# Patient Record
Sex: MALE | Race: OTHER | Employment: OTHER | URBAN - METROPOLITAN AREA
[De-identification: names, ages, dates, MRNs, and addresses within clinical notes are randomized per-mention and may not be internally consistent; named-entity substitution may affect disease eponyms.]

---

## 2018-05-01 ENCOUNTER — HOSPITAL ENCOUNTER (EMERGENCY)
Age: 68
Discharge: HOME | End: 2018-05-01
Payer: MEDICARE

## 2018-05-01 VITALS
DIASTOLIC BLOOD PRESSURE: 78 MMHG | TEMPERATURE: 98 F | WEIGHT: 195 LBS | OXYGEN SATURATION: 98 % | BODY MASS INDEX: 28.88 KG/M2 | SYSTOLIC BLOOD PRESSURE: 173 MMHG | RESPIRATION RATE: 16 BRPM | HEIGHT: 69 IN | HEART RATE: 65 BPM

## 2018-05-01 DIAGNOSIS — J06.9 VIRAL URI WITH COUGH: Primary | ICD-10-CM

## 2018-05-01 LAB
INFLUENZA A BY PCR: NOT DETECTED
INFLUENZA B BY PCR: NOT DETECTED

## 2018-05-01 PROCEDURE — 6370000000 HC RX 637 (ALT 250 FOR IP): Performed by: PHYSICIAN ASSISTANT

## 2018-05-01 PROCEDURE — 99283 EMERGENCY DEPT VISIT LOW MDM: CPT

## 2018-05-01 PROCEDURE — 87502 INFLUENZA DNA AMP PROBE: CPT

## 2018-05-01 RX ORDER — GUAIFENESIN 600 MG/1
1200 TABLET, EXTENDED RELEASE ORAL 2 TIMES DAILY PRN
Qty: 20 TABLET | Refills: 0 | Status: SHIPPED | OUTPATIENT
Start: 2018-05-01 | End: 2019-03-09 | Stop reason: SDUPTHER

## 2018-05-01 RX ORDER — ACETAMINOPHEN 500 MG
1000 TABLET ORAL ONCE
Status: COMPLETED | OUTPATIENT
Start: 2018-05-01 | End: 2018-05-01

## 2018-05-01 RX ORDER — ACETAMINOPHEN 500 MG
500 TABLET ORAL EVERY 6 HOURS PRN
Qty: 120 TABLET | Refills: 3 | Status: SHIPPED | OUTPATIENT
Start: 2018-05-01 | End: 2022-11-01

## 2018-05-01 RX ADMIN — ACETAMINOPHEN 1000 MG: 500 TABLET ORAL at 12:16

## 2018-05-01 ASSESSMENT — PAIN SCALES - GENERAL: PAINLEVEL_OUTOF10: 8

## 2019-03-09 ENCOUNTER — APPOINTMENT (OUTPATIENT)
Dept: GENERAL RADIOLOGY | Age: 69
End: 2019-03-09
Payer: COMMERCIAL

## 2019-03-09 ENCOUNTER — HOSPITAL ENCOUNTER (EMERGENCY)
Age: 69
Discharge: HOME | End: 2019-03-09
Payer: COMMERCIAL

## 2019-03-09 VITALS
WEIGHT: 195 LBS | TEMPERATURE: 98.2 F | BODY MASS INDEX: 29.55 KG/M2 | OXYGEN SATURATION: 95 % | SYSTOLIC BLOOD PRESSURE: 196 MMHG | HEIGHT: 68 IN | RESPIRATION RATE: 18 BRPM | HEART RATE: 77 BPM | DIASTOLIC BLOOD PRESSURE: 87 MMHG

## 2019-03-09 DIAGNOSIS — J06.9 VIRAL URI WITH COUGH: Primary | ICD-10-CM

## 2019-03-09 LAB
INFLUENZA A BY PCR: NOT DETECTED
INFLUENZA B BY PCR: NOT DETECTED

## 2019-03-09 PROCEDURE — 99283 EMERGENCY DEPT VISIT LOW MDM: CPT

## 2019-03-09 PROCEDURE — 71046 X-RAY EXAM CHEST 2 VIEWS: CPT

## 2019-03-09 PROCEDURE — 87502 INFLUENZA DNA AMP PROBE: CPT

## 2019-03-09 PROCEDURE — 6370000000 HC RX 637 (ALT 250 FOR IP): Performed by: PHYSICIAN ASSISTANT

## 2019-03-09 RX ORDER — M-VIT,TX,IRON,MINS/CALC/FOLIC 27MG-0.4MG
1 TABLET ORAL DAILY
COMMUNITY

## 2019-03-09 RX ORDER — ACETAMINOPHEN 325 MG/1
650 TABLET ORAL ONCE
Status: COMPLETED | OUTPATIENT
Start: 2019-03-09 | End: 2019-03-09

## 2019-03-09 RX ORDER — BENZONATATE 100 MG/1
100 CAPSULE ORAL 3 TIMES DAILY PRN
Qty: 21 CAPSULE | Refills: 0 | Status: SHIPPED | OUTPATIENT
Start: 2019-03-09 | End: 2019-03-16

## 2019-03-09 RX ORDER — ASPIRIN 81 MG/1
81 TABLET ORAL DAILY
COMMUNITY
End: 2022-11-01 | Stop reason: SDUPTHER

## 2019-03-09 RX ORDER — GUAIFENESIN 600 MG/1
1200 TABLET, EXTENDED RELEASE ORAL 2 TIMES DAILY PRN
Qty: 20 TABLET | Refills: 0 | Status: SHIPPED | OUTPATIENT
Start: 2019-03-09 | End: 2022-01-29

## 2019-03-09 RX ORDER — IBUPROFEN 800 MG/1
800 TABLET ORAL EVERY 6 HOURS PRN
Qty: 20 TABLET | Refills: 0 | Status: SHIPPED | OUTPATIENT
Start: 2019-03-09 | End: 2022-10-06 | Stop reason: ALTCHOICE

## 2019-03-09 RX ADMIN — ACETAMINOPHEN 650 MG: 325 TABLET ORAL at 12:50

## 2019-03-09 ASSESSMENT — PAIN SCALES - GENERAL
PAINLEVEL_OUTOF10: 9
PAINLEVEL_OUTOF10: 9

## 2019-03-09 ASSESSMENT — PAIN DESCRIPTION - PAIN TYPE: TYPE: ACUTE PAIN

## 2019-03-09 ASSESSMENT — PAIN DESCRIPTION - LOCATION: LOCATION: GENERALIZED

## 2019-03-09 ASSESSMENT — PAIN - FUNCTIONAL ASSESSMENT: PAIN_FUNCTIONAL_ASSESSMENT: ACTIVITIES ARE NOT PREVENTED

## 2019-03-09 ASSESSMENT — PAIN DESCRIPTION - ONSET: ONSET: GRADUAL

## 2019-03-09 ASSESSMENT — PAIN DESCRIPTION - PROGRESSION: CLINICAL_PROGRESSION: GRADUALLY WORSENING

## 2019-03-09 ASSESSMENT — PAIN DESCRIPTION - DESCRIPTORS: DESCRIPTORS: ACHING

## 2019-06-14 ENCOUNTER — TELEPHONE (OUTPATIENT)
Dept: ORTHOPEDIC SURGERY | Age: 69
End: 2019-06-14

## 2019-06-14 DIAGNOSIS — R52 PAIN: Primary | ICD-10-CM

## 2019-06-17 ENCOUNTER — OFFICE VISIT (OUTPATIENT)
Dept: ORTHOPEDIC SURGERY | Age: 69
End: 2019-06-17
Payer: COMMERCIAL

## 2019-06-17 VITALS — DIASTOLIC BLOOD PRESSURE: 64 MMHG | RESPIRATION RATE: 20 BRPM | HEART RATE: 64 BPM | SYSTOLIC BLOOD PRESSURE: 162 MMHG

## 2019-06-17 DIAGNOSIS — M65.342 TRIGGER FINGER, LEFT RING FINGER: Primary | ICD-10-CM

## 2019-06-17 PROCEDURE — 99203 OFFICE O/P NEW LOW 30 MIN: CPT | Performed by: ORTHOPAEDIC SURGERY

## 2019-06-17 PROCEDURE — 20550 NJX 1 TENDON SHEATH/LIGAMENT: CPT | Performed by: ORTHOPAEDIC SURGERY

## 2019-06-17 RX ORDER — BETAMETHASONE SODIUM PHOSPHATE AND BETAMETHASONE ACETATE 3; 3 MG/ML; MG/ML
6 INJECTION, SUSPENSION INTRA-ARTICULAR; INTRALESIONAL; INTRAMUSCULAR; SOFT TISSUE ONCE
Status: COMPLETED | OUTPATIENT
Start: 2019-06-17 | End: 2019-06-17

## 2019-06-17 RX ADMIN — BETAMETHASONE SODIUM PHOSPHATE AND BETAMETHASONE ACETATE 6 MG: 3; 3 INJECTION, SUSPENSION INTRA-ARTICULAR; INTRALESIONAL; INTRAMUSCULAR; SOFT TISSUE at 13:10

## 2019-06-17 NOTE — PROGRESS NOTES
Department of Orthopedic Surgery  [facility] Nolvia Schwab MD  History and Physical      CHIEF COMPLAINT: Ring finger trigger    HISTORY OF PRESENT ILLNESS:                The patient is a 78 y.o. male who presents with triggering of the left ring finger. He has a history of triggering in the right ring finger for which she has had previous cortisone injections and ultimately surgery. He reports new onset of triggering in the left ring finger which is bothersome to him. Denies any nocturnal symptoms of numbness or tingling. No paresthesias. No other problems reported. .    Past Medical History:        Diagnosis Date    Diabetes ([address] 2002    Hypertension 2015     Past Surgical History:    History reviewed. No pertinent surgical history. Current Medications:   No current facility-administered medications for this visit. Allergies:  Patient has no known allergies. Social History:   TOBACCO:   reports that he has never smoked. He has never used smokeless tobacco.  ETOH:   reports that he does not drink alcohol. DRUGS:   reports that he does not use drugs. ACTIVITIES OF DAILY LIVING:    OCCUPATION:    Family History:   History reviewed. No pertinent family history.     REVIEW OF SYSTEMS:  CONSTITUTIONAL:  negative  EYES:  negative  HEENT:  negative  RESPIRATORY:  negative  CARDIOVASCULAR: HTN  GASTROINTESTINAL:  negative  GENITOURINARY:  negative  INTEGUMENT/BREAST:  negative  HEMATOLOGIC/LYMPHATIC:  negative  ALLERGIC/IMMUNOLOGIC:  negative  ENDOCRINE:  diabetes  MUSCULOSKELETAL:  negative  NEUROLOGICAL:  negative  BEHAVIOR/PSYCH:  negative    PHYSICAL EXAM:    VITALS:  BP (!) 162/64 (Site: Right Upper Arm, Position: Sitting, Cuff Size: Medium Adult)   Pulse 64   Resp 20   CONSTITUTIONAL:  awake, alert, cooperative, no apparent distress, and appears stated age  EYES:  Lids and lashes normal, pupils equal, round and reactive to light, extra ocular muscles intact, sclera clear, conjunctiva normal  ENT: identified as the injection site. The risk and benefits of a cortisone injection were explained and the patient consented to the injection. Under sterile conditions, the digit was injected with a mixture of 1 mL of 1% Lidocaine and 1 mL of 6 mg/mL Betamethasone without complication. A sterile bandage was applied.

## 2019-09-13 ENCOUNTER — TELEPHONE (OUTPATIENT)
Dept: ORTHOPEDIC SURGERY | Age: 69
End: 2019-09-13

## 2019-09-13 DIAGNOSIS — R52 PAIN: Primary | ICD-10-CM

## 2019-09-17 ENCOUNTER — OFFICE VISIT (OUTPATIENT)
Dept: ORTHOPEDIC SURGERY | Age: 69
End: 2019-09-17
Payer: COMMERCIAL

## 2019-09-17 VITALS
RESPIRATION RATE: 20 BRPM | HEART RATE: 76 BPM | SYSTOLIC BLOOD PRESSURE: 149 MMHG | DIASTOLIC BLOOD PRESSURE: 75 MMHG | TEMPERATURE: 98.5 F

## 2019-09-17 DIAGNOSIS — M65.342 TRIGGER FINGER, LEFT RING FINGER: Primary | ICD-10-CM

## 2019-09-17 PROCEDURE — 20550 NJX 1 TENDON SHEATH/LIGAMENT: CPT | Performed by: ORTHOPAEDIC SURGERY

## 2019-09-17 RX ORDER — BETAMETHASONE SODIUM PHOSPHATE AND BETAMETHASONE ACETATE 3; 3 MG/ML; MG/ML
6 INJECTION, SUSPENSION INTRA-ARTICULAR; INTRALESIONAL; INTRAMUSCULAR; SOFT TISSUE ONCE
Status: COMPLETED | OUTPATIENT
Start: 2019-09-17 | End: 2019-09-17

## 2019-09-17 RX ADMIN — BETAMETHASONE SODIUM PHOSPHATE AND BETAMETHASONE ACETATE 6 MG: 3; 3 INJECTION, SUSPENSION INTRA-ARTICULAR; INTRALESIONAL; INTRAMUSCULAR; SOFT TISSUE at 14:07

## 2019-09-17 NOTE — PROGRESS NOTES
injection. If the injection does not provide improvement discussed briefly surgical intervention. All questions answered. Procedure Note Trigger Finger Injection    The left Ring finger A1 pulley was identified as the injection site. The risk and benefits of a cortisone injection were explained and the patient consented to the injection. Under sterile conditions, the digit was injected with a mixture of 1 mL of 1% Lidocaine and 1 mL of 6 mg/mL Betamethasone without complication. A sterile bandage was applied.

## 2019-12-17 ENCOUNTER — OFFICE VISIT (OUTPATIENT)
Dept: ORTHOPEDIC SURGERY | Age: 69
End: 2019-12-17
Payer: COMMERCIAL

## 2019-12-17 VITALS — DIASTOLIC BLOOD PRESSURE: 86 MMHG | HEART RATE: 82 BPM | RESPIRATION RATE: 20 BRPM | SYSTOLIC BLOOD PRESSURE: 166 MMHG

## 2019-12-17 DIAGNOSIS — M65.342 TRIGGER FINGER, LEFT RING FINGER: Primary | ICD-10-CM

## 2019-12-17 PROCEDURE — 99212 OFFICE O/P EST SF 10 MIN: CPT | Performed by: ORTHOPAEDIC SURGERY

## 2019-12-17 PROCEDURE — 20550 NJX 1 TENDON SHEATH/LIGAMENT: CPT | Performed by: ORTHOPAEDIC SURGERY

## 2019-12-17 RX ORDER — BETAMETHASONE SODIUM PHOSPHATE AND BETAMETHASONE ACETATE 3; 3 MG/ML; MG/ML
6 INJECTION, SUSPENSION INTRA-ARTICULAR; INTRALESIONAL; INTRAMUSCULAR; SOFT TISSUE ONCE
Status: COMPLETED | OUTPATIENT
Start: 2019-12-17 | End: 2019-12-17

## 2019-12-17 RX ADMIN — BETAMETHASONE SODIUM PHOSPHATE AND BETAMETHASONE ACETATE 6 MG: 3; 3 INJECTION, SUSPENSION INTRA-ARTICULAR; INTRALESIONAL; INTRAMUSCULAR; SOFT TISSUE at 13:30

## 2020-06-18 ENCOUNTER — APPOINTMENT (OUTPATIENT)
Dept: GENERAL RADIOLOGY | Age: 70
End: 2020-06-18
Payer: MEDICARE

## 2020-06-18 ENCOUNTER — HOSPITAL ENCOUNTER (EMERGENCY)
Age: 70
Discharge: HOME | End: 2020-06-18
Attending: EMERGENCY MEDICINE
Payer: MEDICARE

## 2020-06-18 VITALS
TEMPERATURE: 97.5 F | SYSTOLIC BLOOD PRESSURE: 177 MMHG | DIASTOLIC BLOOD PRESSURE: 76 MMHG | HEART RATE: 77 BPM | RESPIRATION RATE: 16 BRPM | OXYGEN SATURATION: 94 %

## 2020-06-18 LAB
ANION GAP SERPL CALCULATED.3IONS-SCNC: 8 MMOL/L (ref 7–16)
BASOPHILS ABSOLUTE: 0.09 E9/L (ref 0–0.2)
BASOPHILS RELATIVE PERCENT: 1.2 % (ref 0–2)
BUN BLDV-MCNC: 20 MG/DL (ref 8–23)
CALCIUM SERPL-MCNC: 9.8 MG/DL (ref 8.6–10.2)
CHLORIDE BLD-SCNC: 102 MMOL/L (ref 98–107)
CO2: 29 MMOL/L (ref 22–29)
CREAT SERPL-MCNC: 0.8 MG/DL (ref 0.7–1.2)
EOSINOPHILS ABSOLUTE: 0.22 E9/L (ref 0.05–0.5)
EOSINOPHILS RELATIVE PERCENT: 3 % (ref 0–6)
GFR AFRICAN AMERICAN: >60
GFR NON-AFRICAN AMERICAN: >60 ML/MIN/1.73
GLUCOSE BLD-MCNC: 106 MG/DL (ref 74–99)
HCT VFR BLD CALC: 41.4 % (ref 37–54)
HEMOGLOBIN: 13.6 G/DL (ref 12.5–16.5)
IMMATURE GRANULOCYTES #: 0.08 E9/L
IMMATURE GRANULOCYTES %: 1.1 % (ref 0–5)
LACTIC ACID: 2.3 MMOL/L (ref 0.5–2.2)
LACTIC ACID: 4.1 MMOL/L (ref 0.5–2.2)
LYMPHOCYTES ABSOLUTE: 2.41 E9/L (ref 1.5–4)
LYMPHOCYTES RELATIVE PERCENT: 33 % (ref 20–42)
MCH RBC QN AUTO: 30.4 PG (ref 26–35)
MCHC RBC AUTO-ENTMCNC: 32.9 % (ref 32–34.5)
MCV RBC AUTO: 92.6 FL (ref 80–99.9)
MONOCYTES ABSOLUTE: 0.71 E9/L (ref 0.1–0.95)
MONOCYTES RELATIVE PERCENT: 9.7 % (ref 2–12)
NEUTROPHILS ABSOLUTE: 3.8 E9/L (ref 1.8–7.3)
NEUTROPHILS RELATIVE PERCENT: 52 % (ref 43–80)
PDW BLD-RTO: 12.6 FL (ref 11.5–15)
PLATELET # BLD: 272 E9/L (ref 130–450)
PMV BLD AUTO: 10 FL (ref 7–12)
POTASSIUM SERPL-SCNC: 4.9 MMOL/L (ref 3.5–5)
RBC # BLD: 4.47 E12/L (ref 3.8–5.8)
SODIUM BLD-SCNC: 139 MMOL/L (ref 132–146)
STREP GRP A PCR: NEGATIVE
WBC # BLD: 7.3 E9/L (ref 4.5–11.5)

## 2020-06-18 PROCEDURE — 99283 EMERGENCY DEPT VISIT LOW MDM: CPT

## 2020-06-18 PROCEDURE — 87880 STREP A ASSAY W/OPTIC: CPT

## 2020-06-18 PROCEDURE — 2580000003 HC RX 258: Performed by: EMERGENCY MEDICINE

## 2020-06-18 PROCEDURE — 71045 X-RAY EXAM CHEST 1 VIEW: CPT

## 2020-06-18 PROCEDURE — U0003 INFECTIOUS AGENT DETECTION BY NUCLEIC ACID (DNA OR RNA); SEVERE ACUTE RESPIRATORY SYNDROME CORONAVIRUS 2 (SARS-COV-2) (CORONAVIRUS DISEASE [COVID-19]), AMPLIFIED PROBE TECHNIQUE, MAKING USE OF HIGH THROUGHPUT TECHNOLOGIES AS DESCRIBED BY CMS-2020-01-R: HCPCS

## 2020-06-18 PROCEDURE — 83605 ASSAY OF LACTIC ACID: CPT

## 2020-06-18 PROCEDURE — 80048 BASIC METABOLIC PNL TOTAL CA: CPT

## 2020-06-18 PROCEDURE — 85025 COMPLETE CBC W/AUTO DIFF WBC: CPT

## 2020-06-18 PROCEDURE — 36415 COLL VENOUS BLD VENIPUNCTURE: CPT

## 2020-06-18 RX ORDER — 0.9 % SODIUM CHLORIDE 0.9 %
1000 INTRAVENOUS SOLUTION INTRAVENOUS ONCE
Status: COMPLETED | OUTPATIENT
Start: 2020-06-18 | End: 2020-06-18

## 2020-06-18 RX ADMIN — SODIUM CHLORIDE 1000 ML: 9 INJECTION, SOLUTION INTRAVENOUS at 20:37

## 2020-06-18 ASSESSMENT — ENCOUNTER SYMPTOMS
ABDOMINAL PAIN: 0
SINUS PRESSURE: 0
WHEEZING: 0
CHOKING: 0
COUGH: 1
VOICE CHANGE: 0
SHORTNESS OF BREATH: 0
COLOR CHANGE: 0
RHINORRHEA: 0
VOMITING: 0
SINUS CONGESTION: 0
EYE ITCHING: 0
ABDOMINAL DISTENTION: 0
STRIDOR: 0
EYE DISCHARGE: 0
DIARRHEA: 0
NAUSEA: 0
SORE THROAT: 1
CHEST TIGHTNESS: 0
EYE REDNESS: 0

## 2020-06-18 ASSESSMENT — PAIN SCALES - GENERAL: PAINLEVEL_OUTOF10: 7

## 2020-06-18 ASSESSMENT — PAIN DESCRIPTION - LOCATION: LOCATION: GENERALIZED

## 2020-06-18 NOTE — ED PROVIDER NOTES
Patient is a 76-year-old male, past medical history of hypertension, diabetes, who presents via private vehicle from home for cough, sore throat, body aches. Patient reports that since returning from [facility] after a 4-month stay, 2 days ago he has had recurring subjective fevers and chills associated with diffuse body aches and mild sore throat and nonproductive cough. Tylenol seems to make it better. Nothing else seems to make it better or worse. Denies known sick contacts. Did not check his temperature at home. He denies any shortness of breath or chest pain or pressure, abdominal pain or nausea vomiting or diarrhea, rashes. Leg swelling, history of DVTs or blood clots, recent chemotherapies or cancers. Denies tobacco use. The history is provided by the patient. Cough   Cough characteristics:  Non-productive  Sputum characteristics:  Nondescript  Severity:  Mild  Onset quality:  Sudden  Duration:  2 days  Timing:  Intermittent  Progression:  Unchanged  Chronicity:  New  Smoker: no    Context: not animal exposure, not exposure to allergens, not fumes, not smoke exposure, not upper respiratory infection and not weather changes    Relieved by:  Nothing  Worsened by:  Nothing  Ineffective treatments:  None tried  Associated symptoms: chills, fever, myalgias and sore throat    Associated symptoms: no chest pain, no diaphoresis, no eye discharge, no headaches, no rash, no rhinorrhea, no shortness of breath, no sinus congestion and no wheezing    Risk factors: recent travel    Pharyngitis   Associated symptoms: chills, cough and fever    Associated symptoms: no abdominal pain, no adenopathy, no chest pain, no eye discharge, no headaches, no rash, no rhinorrhea, no shortness of breath, no sinus congestion, no stridor and no voice change         Review of Systems   Constitutional: Positive for chills and fever. Negative for diaphoresis and unexpected weight change. HENT: Positive for sore throat. answered. --------------------------------------------- PAST HISTORY ---------------------------------------------  Past Medical History:  has a past medical history of Diabetes ([address] and Hypertension. Past Surgical History:  has no past surgical history on file. Social History:  reports that he has never smoked. He has never used smokeless tobacco. He reports that he does not drink alcohol or use drugs. Family History: family history is not on file. The patients home medications have been reviewed. Allergies: Patient has no known allergies.     -------------------------------------------------- RESULTS -------------------------------------------------  Labs:  Results for orders placed or performed during the hospital encounter of 06/18/20   Strep Screen Group A Throat   Result Value Ref Range    Strep Grp A PCR Negative Negative   CBC Auto Differential   Result Value Ref Range    WBC 7.3 4.5 - 11.5 E9/L    RBC 4.47 3.80 - 5.80 E12/L    Hemoglobin 13.6 12.5 - 16.5 g/dL    Hematocrit 41.4 37.0 - 54.0 %    MCV 92.6 80.0 - 99.9 fL    MCH 30.4 26.0 - 35.0 pg    MCHC 32.9 32.0 - 34.5 %    RDW 12.6 11.5 - 15.0 fL    Platelets 630 098 - 504 E9/L    MPV 10.0 7.0 - 12.0 fL    Neutrophils % 52.0 43.0 - 80.0 %    Immature Granulocytes % 1.1 0.0 - 5.0 %    Lymphocytes % 33.0 20.0 - 42.0 %    Monocytes % 9.7 2.0 - 12.0 %    Eosinophils % 3.0 0.0 - 6.0 %    Basophils % 1.2 0.0 - 2.0 %    Neutrophils Absolute 3.80 1.80 - 7.30 E9/L    Immature Granulocytes # 0.08 E9/L    Lymphocytes Absolute 2.41 1.50 - 4.00 E9/L    Monocytes Absolute 0.71 0.10 - 0.95 E9/L    Eosinophils Absolute 0.22 0.05 - 0.50 E9/L    Basophils Absolute 0.09 0.00 - 0.20 H0/K   Basic Metabolic Panel   Result Value Ref Range    Sodium 139 132 - 146 mmol/L    Potassium 4.9 3.5 - 5.0 mmol/L    Chloride 102 98 - 107 mmol/L    CO2 29 22 - 29 mmol/L    Anion Gap 8 7 - 16 mmol/L    Glucose 106 (H) 74 - 99 mg/dL    BUN 20 8 - 23 mg/dL    CREATININE 0.8 0.7 - 1.2 mg/dL    GFR Non-African American >60 >=60 mL/min/1.73    GFR African American >60     Calcium 9.8 8.6 - 10.2 mg/dL   Lactic Acid, Plasma   Result Value Ref Range    Lactic Acid 4.1 (HH) 0.5 - 2.2 mmol/L   Covid-19 Ambulatory   Result Value Ref Range    Source NP swab    Lactic Acid, Plasma   Result Value Ref Range    Lactic Acid 2.3 (H) 0.5 - 2.2 mmol/L       Radiology:  XR CHEST PORTABLE   Final Result   Suboptimal inspiration. No acute cardiopulmonary findings. ------------------------- NURSING NOTES AND VITALS REVIEWED ---------------------------  Date / Time Roomed:  6/18/2020  7:27 PM  ED Bed Assignment:  06/06    The nursing notes within the ED encounter and vital signs as below have been reviewed. BP (!) 177/76   Pulse 77   Temp 97.5 °F (36.4 °C) (Infrared)   Resp 16   SpO2 94%   Oxygen Saturation Interpretation: Normal      ------------------------------------------ PROGRESS NOTES ------------------------------------------  11:28 PM EDT  I have spoken with the patient and discussed todays results, in addition to providing specific details for the plan of care and counseling regarding the diagnosis and prognosis. Their questions are answered at this time and they are agreeable with the plan. I discussed at length with them reasons for immediate return here for re evaluation. They will followup with their primary care physician by calling their office tomorrow. --------------------------------- ADDITIONAL PROVIDER NOTES ---------------------------------  At this time the patient is without objective evidence of an acute process requiring hospitalization or inpatient management. They have remained hemodynamically stable throughout their entire ED visit and are stable for discharge with outpatient follow-up. The plan has been discussed in detail and they are aware of the specific conditions for emergent return, as well as the importance of follow-up.       Discharge

## 2020-06-19 ENCOUNTER — CARE COORDINATION (OUTPATIENT)
Dept: CASE MANAGEMENT | Age: 70
End: 2020-06-19

## 2020-06-19 NOTE — CARE COORDINATION
are being evaluated for COVID-19. If possible, put on a facemask before emergency medical services arrive. The patient agrees to contact the [employer] exposure line [phone], local [facility] department [place] [facility]: ([phone]) and PCP office for questions related to their healthcare. Author provided contact information for future reference.     Patient/family/caregiver given information for [employer] and agrees to enroll {Blank Single Select Template:20061::chelsey Donohue, [address] [place] [address] Coordination Transition

## 2020-06-20 LAB
SARS-COV-2: NOT DETECTED
SOURCE: NORMAL

## 2020-06-30 ENCOUNTER — CARE COORDINATION (OUTPATIENT)
Dept: CASE MANAGEMENT | Age: 70
End: 2020-06-30

## 2020-09-06 ENCOUNTER — APPOINTMENT (OUTPATIENT)
Dept: GENERAL RADIOLOGY | Age: 70
End: 2020-09-06
Payer: MEDICARE

## 2020-09-06 ENCOUNTER — HOSPITAL ENCOUNTER (EMERGENCY)
Age: 70
Discharge: HOME | End: 2020-09-06
Attending: EMERGENCY MEDICINE
Payer: MEDICARE

## 2020-09-06 VITALS
DIASTOLIC BLOOD PRESSURE: 78 MMHG | TEMPERATURE: 97.8 F | SYSTOLIC BLOOD PRESSURE: 180 MMHG | HEIGHT: 68 IN | BODY MASS INDEX: 29.55 KG/M2 | WEIGHT: 195 LBS | RESPIRATION RATE: 16 BRPM | OXYGEN SATURATION: 99 % | HEART RATE: 78 BPM

## 2020-09-06 PROCEDURE — 99282 EMERGENCY DEPT VISIT SF MDM: CPT

## 2020-09-06 PROCEDURE — 99281 EMR DPT VST MAYX REQ PHY/QHP: CPT

## 2020-09-06 PROCEDURE — 6370000000 HC RX 637 (ALT 250 FOR IP): Performed by: STUDENT IN AN ORGANIZED HEALTH CARE EDUCATION/TRAINING PROGRAM

## 2020-09-06 PROCEDURE — 73610 X-RAY EXAM OF ANKLE: CPT

## 2020-09-06 PROCEDURE — 99283 EMERGENCY DEPT VISIT LOW MDM: CPT

## 2020-09-06 RX ORDER — CEPHALEXIN 500 MG/1
500 CAPSULE ORAL 4 TIMES DAILY
Qty: 28 CAPSULE | Refills: 0 | Status: SHIPPED | OUTPATIENT
Start: 2020-09-06 | End: 2020-09-13

## 2020-09-06 RX ORDER — HYDROCODONE BITARTRATE AND ACETAMINOPHEN 5; 325 MG/1; MG/1
1 TABLET ORAL ONCE
Status: COMPLETED | OUTPATIENT
Start: 2020-09-06 | End: 2020-09-06

## 2020-09-06 RX ORDER — NAPROXEN 500 MG/1
500 TABLET ORAL 2 TIMES DAILY PRN
Qty: 28 TABLET | Refills: 0 | Status: SHIPPED | OUTPATIENT
Start: 2020-09-06 | End: 2022-10-06 | Stop reason: ALTCHOICE

## 2020-09-06 RX ADMIN — HYDROCODONE BITARTRATE AND ACETAMINOPHEN 1 TABLET: 5; 325 TABLET ORAL at 11:46

## 2020-09-06 ASSESSMENT — PAIN SCALES - GENERAL: PAINLEVEL_OUTOF10: 10

## 2020-09-07 NOTE — ED PROVIDER NOTES
Constitutional:       General: He is not in acute distress. Appearance: He is well-developed. He is not ill-appearing, toxic-appearing or diaphoretic. HENT:      Head: Normocephalic and atraumatic. Right Ear: Hearing normal.      Left Ear: Hearing normal.      Nose: Nose normal.      Mouth/Throat:      Lips: Pink. No lesions. Mouth: Mucous membranes are moist.   Eyes:      General: Lids are normal.   Neck:      Musculoskeletal: Normal range of motion and neck supple. Cardiovascular:      Rate and Rhythm: Normal rate and regular rhythm. Heart sounds: Normal heart sounds, S1 normal and S2 normal. No murmur. Pulmonary:      Effort: Pulmonary effort is normal. No respiratory distress. Breath sounds: Normal breath sounds. No wheezing or rales. Abdominal:      General: Bowel sounds are normal.      Palpations: Abdomen is soft. Tenderness: There is no abdominal tenderness. There is no guarding or rebound. Musculoskeletal:      Comments: Mild erythema, swelling, and tenderness over the medial aspect of the left ankle/foot just anterior to the medial malleolus. No warmth. DP pulse +2. Sensation of foot intact and equal bilaterally. Full ROM with pain. Compartments are soft and non-tender. Skin:     General: Skin is warm and dry. Neurological:      Mental Status: He is alert and oriented to person, place, and time. Motor: No tremor or abnormal muscle tone. Coordination: Coordination normal.          Procedures     MDM  Number of Diagnoses or Management Options  Cellulitis of left lower extremity:   Left foot pain:   Diagnosis management comments: The patient is a 69year old male who presents to the ED complaining of foot pain. He is hemodynamically stable, non-toxic, an in no acute distress. I suspect stress fracture vs cellulitis. Obtained xray which was negative for acute abnormality. Provided with a dose of Norco and a prescription for Keflex.  Recommended to follow up with PCP. He is to return here for worsening symptoms or other acute symptoms or concerns. Patient verbalized understanding and agreement to plan and discharge instructions.  was used. ED Course as of Sep 07 1631   [facility] Sep 06, 2020   1049 Left foot-mild erythema to medial aspect of ankle, more range of motion to joint, no warmth erythema to joint, no wounds, DP and PT pulses intact, soft and easily compressible compartments, no calf tenderness or swelling. [JA]      ED Course User Index  [JA] Ricky Child MD          ED Course as of Sep 07 1631   [facility] Sep 06, 2020   1049 Left foot-mild erythema to medial aspect of ankle, more range of motion to joint, no warmth erythema to joint, no wounds, DP and PT pulses intact, soft and easily compressible compartments, no calf tenderness or swelling. [JA]      ED Course User Index  [JA] Ricky Child MD       --------------------------------------------- PAST HISTORY ---------------------------------------------  Past Medical History:  has a past medical history of Diabetes ([address] and Hypertension. Past Surgical History:  has no past surgical history on file. Social History:  reports that he has never smoked. He has never used smokeless tobacco. He reports that he does not drink alcohol or use drugs. Family History: family history is not on file. The patients home medications have been reviewed. Allergies: Patient has no known allergies. -------------------------------------------------- RESULTS -------------------------------------------------  Labs:  No results found for this visit on 09/06/20. Radiology:  XR ANKLE LEFT (MIN 3 VIEWS)   Final Result   No acute osseous abnormality seen.                    ------------------------- NURSING NOTES AND VITALS REVIEWED ---------------------------  Date / Time Roomed:  9/6/2020 10:32 AM  ED Bed Assignment:  21/21    The nursing notes within the ED encounter and vital signs as below have been reviewed. BP (!) 180/78   Pulse 78   Temp 97.8 °F (36.6 °C) (Oral)   Resp 16   Ht 5' 8\" (1.727 m)   Wt 195 lb (88.5 kg)   SpO2 99%   BMI 29.65 kg/m²   Oxygen Saturation Interpretation: Normal      ------------------------------------------ PROGRESS NOTES ------------------------------------------  4:31 PM EDT  I have spoken with the patient and discussed todays results, in addition to providing specific details for the plan of care and counseling regarding the diagnosis and prognosis. Their questions are answered at this time and they are agreeable with the plan. I discussed at length with them reasons for immediate return here for re evaluation. They will followup with their primary care physician by calling their office tomorrow. --------------------------------- ADDITIONAL PROVIDER NOTES ---------------------------------  At this time the patient is without objective evidence of an acute process requiring hospitalization or inpatient management. They have remained hemodynamically stable throughout their entire ED visit and are stable for discharge with outpatient follow-up. The plan has been discussed in detail and they are aware of the specific conditions for emergent return, as well as the importance of follow-up. Discharge Medication List as of 9/6/2020 11:39 AM      START taking these medications    Details   cephALEXin (KEFLEX) 500 MG capsule Take 1 capsule by mouth 4 times daily for 7 days, Disp-28 capsule,R-0Print      naproxen (NAPROSYN) 500 MG tablet Take 1 tablet by mouth 2 times daily as needed for Pain, Disp-28 tablet,R-0Print             Diagnosis:  1. Left foot pain    2. Cellulitis of left lower extremity        Disposition:  Patient's disposition: Discharge to home  Patient's condition is stable.        James Smoker, DO  Resident  09/08/20 [id number]

## 2020-09-08 ASSESSMENT — ENCOUNTER SYMPTOMS
EYE PAIN: 0
EYE REDNESS: 0
SORE THROAT: 0
COUGH: 0
SINUS PRESSURE: 0
EYE DISCHARGE: 0
WHEEZING: 0
VOMITING: 0
BACK PAIN: 0
SHORTNESS OF BREATH: 0
ABDOMINAL PAIN: 0
NAUSEA: 0
DIARRHEA: 0

## 2020-09-24 ENCOUNTER — HOSPITAL ENCOUNTER (OUTPATIENT)
Dept: CT IMAGING | Age: 70
Discharge: HOME | End: 2020-09-26
Payer: MEDICARE

## 2020-09-24 PROCEDURE — 70450 CT HEAD/BRAIN W/O DYE: CPT

## 2020-10-20 ENCOUNTER — HOSPITAL ENCOUNTER (OUTPATIENT)
Age: 70
Discharge: HOME | End: 2020-10-22
Payer: MEDICARE

## 2020-10-20 PROCEDURE — 87186 SC STD MICRODIL/AGAR DIL: CPT

## 2020-10-20 PROCEDURE — 87070 CULTURE OTHR SPECIMN AEROBIC: CPT

## 2020-10-20 PROCEDURE — 87205 SMEAR GRAM STAIN: CPT

## 2020-10-20 PROCEDURE — 87077 CULTURE AEROBIC IDENTIFY: CPT

## 2020-10-21 LAB — GRAM STAIN ORDERABLE: NORMAL

## 2020-10-23 LAB
GRAM STAIN RESULT: ABNORMAL
ORGANISM: ABNORMAL
WOUND/ABSCESS: ABNORMAL

## 2020-12-21 ENCOUNTER — HOSPITAL ENCOUNTER (OUTPATIENT)
Age: 70
Discharge: HOME | End: 2020-12-23
Payer: MEDICARE

## 2020-12-21 PROCEDURE — U0003 INFECTIOUS AGENT DETECTION BY NUCLEIC ACID (DNA OR RNA); SEVERE ACUTE RESPIRATORY SYNDROME CORONAVIRUS 2 (SARS-COV-2) (CORONAVIRUS DISEASE [COVID-19]), AMPLIFIED PROBE TECHNIQUE, MAKING USE OF HIGH THROUGHPUT TECHNOLOGIES AS DESCRIBED BY CMS-2020-01-R: HCPCS

## 2020-12-23 LAB
SARS-COV-2: NOT DETECTED
SOURCE: NORMAL

## 2020-12-28 LAB
ALBUMIN SERPL-MCNC: NORMAL G/DL
ALP BLD-CCNC: NORMAL U/L
ALT SERPL-CCNC: NORMAL U/L
ANION GAP SERPL CALCULATED.3IONS-SCNC: NORMAL MMOL/L
AST SERPL-CCNC: NORMAL U/L
AVERAGE GLUCOSE: 157
BILIRUB SERPL-MCNC: NORMAL MG/DL
BUN BLDV-MCNC: NORMAL MG/DL
CALCIUM SERPL-MCNC: NORMAL MG/DL
CHLORIDE BLD-SCNC: NORMAL MMOL/L
CHOLESTEROL, TOTAL: NORMAL
CHOLESTEROL/HDL RATIO: NORMAL
CO2: NORMAL
CREAT SERPL-MCNC: NORMAL MG/DL
GFR CALCULATED: NORMAL
GLUCOSE BLD-MCNC: NORMAL MG/DL
HBA1C MFR BLD: 7.1 %
HDLC SERPL-MCNC: NORMAL MG/DL
LDL CHOLESTEROL CALCULATED: NORMAL
NONHDLC SERPL-MCNC: NORMAL MG/DL
POTASSIUM SERPL-SCNC: NORMAL MMOL/L
SODIUM BLD-SCNC: NORMAL MMOL/L
TOTAL PROTEIN: NORMAL
TRIGL SERPL-MCNC: NORMAL MG/DL
VLDLC SERPL CALC-MCNC: NORMAL MG/DL

## 2021-01-09 ENCOUNTER — HOSPITAL ENCOUNTER (OUTPATIENT)
Dept: SLEEP CENTER | Age: 71
Discharge: HOME | End: 2021-01-09
Payer: MEDICARE

## 2021-01-09 DIAGNOSIS — R06.83 SNORES: ICD-10-CM

## 2021-01-09 DIAGNOSIS — R06.81 WITNESSED APNEIC SPELLS: Primary | ICD-10-CM

## 2021-01-09 DIAGNOSIS — G47.9 RESTLESS SLEEPER: ICD-10-CM

## 2021-01-09 DIAGNOSIS — R51.9 MORNING HEADACHE: ICD-10-CM

## 2021-01-09 PROCEDURE — 95810 POLYSOM 6/> YRS 4/> PARAM: CPT

## 2021-01-10 VITALS
WEIGHT: 192 LBS | OXYGEN SATURATION: 98 % | TEMPERATURE: 97.2 F | HEIGHT: 68 IN | HEART RATE: 71 BPM | DIASTOLIC BLOOD PRESSURE: 82 MMHG | SYSTOLIC BLOOD PRESSURE: 183 MMHG | BODY MASS INDEX: 29.1 KG/M2

## 2021-01-10 ASSESSMENT — SLEEP AND FATIGUE QUESTIONNAIRES
HOW LIKELY ARE YOU TO NOD OFF OR FALL ASLEEP WHILE SITTING AND READING: 0
HOW LIKELY ARE YOU TO NOD OFF OR FALL ASLEEP WHILE WATCHING TV: 2
HOW LIKELY ARE YOU TO NOD OFF OR FALL ASLEEP WHILE SITTING INACTIVE IN A PUBLIC PLACE: 0
HOW LIKELY ARE YOU TO NOD OFF OR FALL ASLEEP WHILE SITTING QUIETLY AFTER LUNCH WITHOUT ALCOHOL: 1
HOW LIKELY ARE YOU TO NOD OFF OR FALL ASLEEP WHILE LYING DOWN TO REST IN THE AFTERNOON WHEN CIRCUMSTANCES PERMIT: 2

## 2021-01-12 NOTE — PROGRESS NOTES
[address] [address]                               SLEEP STUDY REPORT    PATIENT NAME: Rexford Hamman            :        1950  MED REC NO:   [id number]                            ROOM:  ACCOUNT NO:   [de-identified]                           ADMIT DATE: 2021  PROVIDER:     Tha Brennan MD    DATE OF STUDY:  2021    The patient is 68 inches tall, 192 pounds, BMI is 29.19, complains of  being tired, forgetful with witnessed apneas. MEDICATIONS:  Metoprolol, _____, aspirin, multivitamins, Ginkgo Biloba. The patient scored 5/24 on the Hancock Sleepiness Scale. Neck  circumference is 17 inches. Nocturnal polysomnography was done utilizing a standard six EEG sleep  apnea protocol. Raw data was reviewed in detail. Total recording time  was 485 minutes out of which total sleep time was 125 minutes resulting  in sleep efficiency of only 26%. The patient had sleep latency of 40  minutes and was awake for another 320 minutes after sleep onset. The  patient spent 86% of the night in N1 sleep and 13% in N2 sleep. In the two hours sleep disordered breathing, using the 4% desaturation  criteria, the patient had 117 apneas which consisted of 5 central apneas  and 110 obstructive apneas and 2 mixed apneas for an apnea index of  56.1. These events lasted 19 to 33 seconds in duration. The patient  had 56 hypopneas for a hypopnea index of 27. Total apnea/hypopnea index  was 83. These events lasted anywhere from 20 seconds to 37 seconds in  duration. The patient did not achieve REM sleep, so there is no REM  index to report. The patient had no periodic limb movements. The  patient had 182 desaturations with a desaturation index of 22.5 with  lowest oxygen saturation of 79% occurring actually while awake.   Oxygen  level was above 90% for 92% of the night with the rest of the night below 89%. The patient's heart rate was between 55 to 82. There  appeared to be a few PVCs, which were not associated with the  respiratory events. The lowest heart rate was 55 beats per minute. The  sleep hypnogram when reviewed shows very frequent awakenings with the  patient only in N1 and N2 sleep. There are significant desaturations  noted even in three stages of sleep. The study is remarkable for poor sleep efficiency, only 26% of the night  the patient slept. The patient had prolonged episodes of being awake  after sleep onset. Once asleep, the patient had difficulty maintaining  sleep. He was noted to be snoring and grunting through the night. There was no bruxism noted. IMPRESSION:  The patient does meet the criteria for the diagnosis of  severe obstructive sleep apnea syndrome with apnea/hypopnea index of 89  with O2 kentrell of 79. No significant cardiac events were noted. The  patient would benefit from CPAP titration and would avoid sedation on  this patient.         Zach Aguilera MD      D: 01/11/2021 21:04:36       T: 01/12/2021 0:09:54     [email or web address]  Job#: [id number]     Doc#: [id number]    CC:

## 2021-03-03 ENCOUNTER — TELEPHONE (OUTPATIENT)
Dept: ADMINISTRATIVE | Age: 71
End: 2021-03-03

## 2021-03-22 LAB
ALBUMIN SERPL-MCNC: NORMAL G/DL
ALP BLD-CCNC: NORMAL U/L
ALT SERPL-CCNC: NORMAL U/L
ANION GAP SERPL CALCULATED.3IONS-SCNC: NORMAL MMOL/L
AST SERPL-CCNC: NORMAL U/L
AVERAGE GLUCOSE: 169
BILIRUB SERPL-MCNC: NORMAL MG/DL
BUN BLDV-MCNC: NORMAL MG/DL
CALCIUM SERPL-MCNC: NORMAL MG/DL
CHLORIDE BLD-SCNC: NORMAL MMOL/L
CHOLESTEROL, TOTAL: NORMAL
CHOLESTEROL/HDL RATIO: NORMAL
CO2: NORMAL
CREAT SERPL-MCNC: NORMAL MG/DL
CREATININE, URINE: 103.1
GFR CALCULATED: NORMAL
GLUCOSE BLD-MCNC: NORMAL MG/DL
HBA1C MFR BLD: 7.5 %
HDLC SERPL-MCNC: NORMAL MG/DL
LDL CHOLESTEROL CALCULATED: NORMAL
MICROALBUMIN/CREAT 24H UR: 21.4 MG/G{CREAT}
MICROALBUMIN/CREAT UR-RTO: 21
NONHDLC SERPL-MCNC: NORMAL MG/DL
POTASSIUM SERPL-SCNC: NORMAL MMOL/L
SODIUM BLD-SCNC: NORMAL MMOL/L
TOTAL PROTEIN: NORMAL
TRIGL SERPL-MCNC: NORMAL MG/DL
VLDLC SERPL CALC-MCNC: NORMAL MG/DL

## 2021-05-10 ENCOUNTER — OFFICE VISIT (OUTPATIENT)
Dept: NEUROLOGY | Age: 71
End: 2021-05-10
Payer: MEDICARE

## 2021-05-10 VITALS
TEMPERATURE: 97.3 F | HEIGHT: 68 IN | SYSTOLIC BLOOD PRESSURE: 138 MMHG | DIASTOLIC BLOOD PRESSURE: 80 MMHG | BODY MASS INDEX: 29.86 KG/M2 | WEIGHT: 197 LBS

## 2021-05-10 DIAGNOSIS — G47.33 SEVERE OBSTRUCTIVE SLEEP APNEA: ICD-10-CM

## 2021-05-10 DIAGNOSIS — R41.3 SHORT-TERM MEMORY LOSS: Primary | ICD-10-CM

## 2021-05-10 PROCEDURE — 1036F TOBACCO NON-USER: CPT | Performed by: NURSE PRACTITIONER

## 2021-05-10 PROCEDURE — 4040F PNEUMOC VAC/ADMIN/RCVD: CPT | Performed by: NURSE PRACTITIONER

## 2021-05-10 PROCEDURE — 1123F ACP DISCUSS/DSCN MKR DOCD: CPT | Performed by: NURSE PRACTITIONER

## 2021-05-10 PROCEDURE — G8427 DOCREV CUR MEDS BY ELIG CLIN: HCPCS | Performed by: NURSE PRACTITIONER

## 2021-05-10 PROCEDURE — 99203 OFFICE O/P NEW LOW 30 MIN: CPT | Performed by: NURSE PRACTITIONER

## 2021-05-10 PROCEDURE — 3017F COLORECTAL CA SCREEN DOC REV: CPT | Performed by: NURSE PRACTITIONER

## 2021-05-10 PROCEDURE — G8417 CALC BMI ABV UP PARAM F/U: HCPCS | Performed by: NURSE PRACTITIONER

## 2021-05-10 NOTE — PROGRESS NOTES
[address] [facility]. Adrian Carrington M.D., F.A.C.P. Hayder Joshi, DNP, APRN, ACNS-BC  Kerline Boys. Martina Gar, MSN, APRN-FNP-C  Mojgan Holland, MSN, APRN-FNP-C  MCKENZIE Drake PA-C Nevelyn Mention, MSN, APRN-FNP-C  [address] [facility] [address], [id number] [place], [place] [address]  Phone: [phone]  Fax: [phone]       Kamila Siegel is a 69 y.o. right handed male     Patient referred for further evaluation of memory loss    Accompanied by his wife and is Mauritian speaking     Past Medical History:     Past Medical History:   Diagnosis Date    Diabetes ([address] 2002    Hypertension 2015     No history of cardiac, liver, lung or kidney disease. No history of seizures. No history of connective tissue disorders or cancers. No history of exposures to toxins or chemicals. History of hits to the head: none  Injuries: none  MVAs:none    Past Surgical History:       No past surgical history on file. Allergies:       Patient has no known allergies. Medications:     Prior to Admission medications    Medication Sig Start Date End Date Taking?  Authorizing Provider   naproxen (NAPROSYN) 500 MG tablet Take 1 tablet by mouth 2 times daily as needed for Pain 9/6/20   Magui Woods, DO   Multiple Vitamins-Minerals (THERAPEUTIC MULTIVITAMIN-MINERALS) tablet Take 1 tablet by mouth daily    Historical Provider, MD   aspirin 81 MG EC tablet Take 81 mg by mouth daily    Historical Provider, MD   ibuprofen (ADVIL;MOTRIN) 800 MG tablet Take 1 tablet by mouth every 6 hours as needed for Pain With food to prevent stomach upset 3/9/19 3/14/19  Mara Henriquez PA-C   guaiFENesin (MUCINEX) 600 MG extended release tablet Take 2 tablets by mouth 2 times daily as needed for Congestion 3/9/19   Mara Henriquez PA-C   acetaminophen (APAP EXTRA STRENGTH) 500 MG tablet Take 1 tablet by mouth every 6 hours as needed for Pain 5/1/18   KARINE Hernández   losartan (COZAAR) 100 MG tablet Take Temp: 97.3 °F (36.3 °C)   Weight: 197 lb (89.4 kg)   Height: 5' 8\" (1.727 m)     General appearance: alert, appears stated age, cooperative and in no distress  Head: normocephalic, without obvious abnormality, atraumatic  Eyes: conjunctivae/corneas clear; no drainage  Neck: symmetrical, trachea midline   Lungs: clear to auscultation bilaterally  Heart: regular rate and rhythm, S1, S2 normal  Abdomen: soft, non-tender; bowel sounds normal  Extremities: normal, atraumatic, no cyanosis or edema  Skin:  color, texture, turgor normal--no rashes or lesions      Mental Status: alert and oriented x 4    Appropriate attention/concentration  Intact fundus of knowledge  Repetition intact  Memories intact    MMSE: 28/30    Speech: no dysarthria  Language: no aphasias---reading, writing, repetition, and object identification intact    Cranial Nerves:  I: smell    II: visual acuity     II: visual fields Full    II: pupils MU   III,VII: ptosis None   III,IV,VI: extraocular muscles  EOMI without nystagmus   V: mastication Normal   V: facial light touch sensation  Normal   V,VII: corneal reflex     VII: facial muscle function - upper  Normal   VII: facial muscle function - lower Normal   VIII: hearing Normal   IX: soft palate elevation  Normal   IX,X: gag reflex    XI: trapezius strength  5/5   XI: sternocleidomastoid strength 5/5   XI: neck extension strength  5/5   XII: tongue strength  Normal     Motor:  5/5 throughout  Normal bulk and tone  No drift   No abnormal movements    Sensory:  LT normal    Coordination:   FN, FFM and ADDY normal  HS normal    Gait:  Normal    DTR:   2+ throughout     Laboratory/Radiology:  ry/Radiology:     CTH: negative for acute findings, remote infarcts seen in both hemispheres    All images were personally reviewed at the time of this visit    Assessment:     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 All questions were answered prior to leaving my office.

## 2021-05-12 DIAGNOSIS — G47.33 OBSTRUCTIVE SLEEP APNEA (ADULT) (PEDIATRIC): Primary | ICD-10-CM

## 2021-05-24 DIAGNOSIS — G47.33 OBSTRUCTIVE SLEEP APNEA (ADULT) (PEDIATRIC): ICD-10-CM

## 2021-05-26 LAB
SARS-COV-2: NOT DETECTED
SOURCE: NORMAL

## 2021-05-29 ENCOUNTER — HOSPITAL ENCOUNTER (OUTPATIENT)
Dept: SLEEP CENTER | Age: 71
Discharge: HOME | End: 2021-05-29
Payer: MEDICARE

## 2021-05-29 DIAGNOSIS — G47.33 OSA (OBSTRUCTIVE SLEEP APNEA): Primary | ICD-10-CM

## 2021-05-29 PROCEDURE — 95811 POLYSOM 6/>YRS CPAP 4/> PARM: CPT

## 2021-06-03 NOTE — PROGRESS NOTES
[address] [address]                               SLEEP STUDY REPORT    PATIENT NAME: Sameera Stiles            :        1950  MED REC NO:   [id number]                            ROOM:  ACCOUNT NO:   [de-identified]                           ADMIT DATE: 2021  PROVIDER:     Jaimie Sevilla MD    DATE OF STUDY:  2021    REFERRING PROVIDER:  Dr. Harish Sousa. NAME OF STUDY:  CPAP titration study. The patient with a history of moderate DINO with baseline apnea-hypopnea  index of 27 and lowest saturation of 79%. His medications include the  use of metoprolol, aspirin, multivitamins, and Ginkgo biloba. PAST MEDICAL HISTORY:  Significant for hypertension, diabetes, and mild  cognitive impairment. BRIEF HISTORY:  The patient has an [place] score of 5/24. This study  was done with the recording of bilateral six EEGs, sleep apnea protocol,  bilateral EOGs, bilateral chin EMGs, bilateral anterior tibialis EMGs,  chest, and abdominal movements. The patient was able to tolerate  interface, and different kinds of interface masks were used. The  patient finally got settled with the Wilson and Paykel Vitera full face  mask, medium size. SLEEP ARCHITECTURE:  The total recording time was 491 minutes, total  sleep time was 220 minutes, and the sleep efficiency was 44%. The sleep  latency was 54 minutes and the patient spent 260 minutes awake after  sleep onset. The REM latency was 232 minutes and two episodes of REM  were seen during this test.    SLEEP STAGES:  Stage I, 33%; stage II, 43%. The patient was not able to  reach delta sleep and the REM stage was 14%. BODY POSITION:  The patient slept in the supine position throughout the  test.    LIMB MOVEMENTS:  The patient has 86 episodes of limb movements in total  for an index of 23, only one episode of PLM series.     HEART RATES:  Heart rates fluctuated between 49 and 82 beats per minute  with the average heart rate of 58 beats per minute. TITRATION:  The patient started on CPAP of 6 cm of water pressure,  titrated all the way up to 14 cm of water pressure trying to abolish all  the respiratory events and snoring. The patient is having still large  amount of  obstructive apneas, therefore decision was to  switch to bilevel, and started with bilevel 16/12 and titrated all the  way up to 22/18 and the high saturation. The patient has 25 minutes of  recording time, REM time 10 minutes, non-REM time 11 minutes and 17  seconds. The apnea/hypopnea index was 2.8 and the mean oxygen  saturation was 96%. SUMMARY:  In summary, the patient presented with moderate DINO. bilevel 22/18 using as interface a Wilson and Cassia Vitera full-face  mask, medium size; the patient did not require supplemental O2.         Betsy Silver MD    D: 06/03/2021 8:49:40       T: 06/03/2021 10:51:36     [id number]  Job#: [id number]     Doc#: [id number]    CC:

## 2021-06-21 LAB
ALBUMIN SERPL-MCNC: NORMAL G/DL
ALP BLD-CCNC: NORMAL U/L
ALT SERPL-CCNC: NORMAL U/L
ANION GAP SERPL CALCULATED.3IONS-SCNC: NORMAL MMOL/L
AST SERPL-CCNC: NORMAL U/L
AVERAGE GLUCOSE: 166
BILIRUB SERPL-MCNC: NORMAL MG/DL
BUN BLDV-MCNC: NORMAL MG/DL
CALCIUM SERPL-MCNC: NORMAL MG/DL
CHLORIDE BLD-SCNC: NORMAL MMOL/L
CHOLESTEROL, TOTAL: NORMAL
CHOLESTEROL/HDL RATIO: NORMAL
CO2: NORMAL
CREAT SERPL-MCNC: NORMAL MG/DL
GFR CALCULATED: NORMAL
GLUCOSE BLD-MCNC: NORMAL MG/DL
HBA1C MFR BLD: 7.4 %
HDLC SERPL-MCNC: NORMAL MG/DL
LDL CHOLESTEROL CALCULATED: NORMAL
NONHDLC SERPL-MCNC: NORMAL MG/DL
POTASSIUM SERPL-SCNC: NORMAL MMOL/L
SODIUM BLD-SCNC: NORMAL MMOL/L
TOTAL PROTEIN: NORMAL
TRIGL SERPL-MCNC: NORMAL MG/DL
VLDLC SERPL CALC-MCNC: NORMAL MG/DL

## 2021-11-15 LAB
ALBUMIN SERPL-MCNC: NORMAL G/DL
ALP BLD-CCNC: NORMAL U/L
ALT SERPL-CCNC: NORMAL U/L
ANION GAP SERPL CALCULATED.3IONS-SCNC: NORMAL MMOL/L
AST SERPL-CCNC: NORMAL U/L
AVERAGE GLUCOSE: 163
BILIRUB SERPL-MCNC: NORMAL MG/DL
BUN BLDV-MCNC: NORMAL MG/DL
CALCIUM SERPL-MCNC: NORMAL MG/DL
CHLORIDE BLD-SCNC: NORMAL MMOL/L
CHOLESTEROL, TOTAL: NORMAL
CHOLESTEROL/HDL RATIO: NORMAL
CO2: NORMAL
CREAT SERPL-MCNC: NORMAL MG/DL
GFR CALCULATED: NORMAL
GLUCOSE BLD-MCNC: NORMAL MG/DL
HBA1C MFR BLD: 7.3 %
HDLC SERPL-MCNC: NORMAL MG/DL
LDL CHOLESTEROL CALCULATED: NORMAL
NONHDLC SERPL-MCNC: NORMAL MG/DL
POTASSIUM SERPL-SCNC: NORMAL MMOL/L
SODIUM BLD-SCNC: NORMAL MMOL/L
TOTAL PROTEIN: NORMAL
TRIGL SERPL-MCNC: NORMAL MG/DL
VLDLC SERPL CALC-MCNC: NORMAL MG/DL

## 2021-11-16 ENCOUNTER — OFFICE VISIT (OUTPATIENT)
Dept: NEUROLOGY | Age: 71
End: 2021-11-16
Payer: MEDICARE

## 2021-11-16 VITALS
OXYGEN SATURATION: 98 % | DIASTOLIC BLOOD PRESSURE: 87 MMHG | TEMPERATURE: 98.3 F | HEART RATE: 76 BPM | SYSTOLIC BLOOD PRESSURE: 203 MMHG

## 2021-11-16 DIAGNOSIS — R41.841 COGNITIVE COMMUNICATION DISORDER: Primary | ICD-10-CM

## 2021-11-16 DIAGNOSIS — R41.3 SHORT-TERM MEMORY LOSS: ICD-10-CM

## 2021-11-16 PROCEDURE — 1123F ACP DISCUSS/DSCN MKR DOCD: CPT | Performed by: NURSE PRACTITIONER

## 2021-11-16 PROCEDURE — 3017F COLORECTAL CA SCREEN DOC REV: CPT | Performed by: NURSE PRACTITIONER

## 2021-11-16 PROCEDURE — G8417 CALC BMI ABV UP PARAM F/U: HCPCS | Performed by: NURSE PRACTITIONER

## 2021-11-16 PROCEDURE — 99214 OFFICE O/P EST MOD 30 MIN: CPT | Performed by: NURSE PRACTITIONER

## 2021-11-16 PROCEDURE — G8484 FLU IMMUNIZE NO ADMIN: HCPCS | Performed by: NURSE PRACTITIONER

## 2021-11-16 PROCEDURE — G8427 DOCREV CUR MEDS BY ELIG CLIN: HCPCS | Performed by: NURSE PRACTITIONER

## 2021-11-16 PROCEDURE — 1036F TOBACCO NON-USER: CPT | Performed by: NURSE PRACTITIONER

## 2021-11-16 PROCEDURE — 4040F PNEUMOC VAC/ADMIN/RCVD: CPT | Performed by: NURSE PRACTITIONER

## 2021-11-16 RX ORDER — LOSARTAN POTASSIUM 100 MG/1
100 TABLET ORAL DAILY
Qty: 30 TABLET | Refills: 11 | Status: SHIPPED
Start: 2021-11-16 | End: 2022-11-01 | Stop reason: SDUPTHER

## 2021-11-16 NOTE — PROGRESS NOTES
[address] [facility]. Pastor Deborah M.D., F.A.C.P. Vivian Anaya, DNP, APRN, ACNS-BC  Nabila Sandoval, MSN, APRN-FNP-C  Spencer Kc, MSN, APRN-FNP-C  MCKENZIE Varner, PA-C  [address], MSN, APRN-FNP-C  [address] [facility]  [place], [address] [address]  Phone: [phone]  Fax: [phone]       Minnie Hawk is a 66 y.o. right handed male     Patient follows for short term memory loss and history of stroke     Patient is Lao speaking     Onset was several years ago as he was seeing a Neurologist in [facility].  He has noticed difficulty getting his words out and remembering things that were just told to him. He has no issues with ADL's. He is able to dress himself and care for himself. He has gotten lost twice while driving. He has forgotten his bank PIN number a few times which worries him. His wife has noticed some memory loss as well. He has not been started on any new medications. He was recently seen by Sleep medicine and has been diagnosed with DINO. His report indicates that he does meet criteria for diagnosis of severe DINO syndrome with apnea/hypoapnea index. He has a follow up appt with Sleep on 5/29/2021 and would benefit from CPAP titration and would avoid sedation on this patient. He was started on Aricept by his PCP but patient could not tolerate and it was discontinued due to GI upset. Today he brings in a letter from his health insurance provider which indicates that he needs a sleep study or he can send in his CPAP machine. If he does not follow through he will be charged for his CPAP. Explained this to patient in   [facility] [place] and he will try to get in to see Sleep Medicine. He notes having trouble keeping his CPAP on as the pressure is too high? Per patient. He has run out of his BP meds and has not taken his BP meds due to running out this weekend and not being able to get in to see his PCP.   He is hypertensive today due to that scenario.     No issues with chewing or swallowing  No chest pain or palpitations  No SOB  No vertigo, lightheadedness or loss of consciousness  No falls, tripping or stumbling  No incontinence of bowels or bladder  No itching or bruising appreciated  No numbness, tingling or focal arm/leg weakness    ROS is otherwise negative    Objective:     Vitals:    11/16/21 1336   BP: (!) 203/87   Site: Right Upper Arm   Pulse: 76   Temp: 98.3 °F (36.8 °C)   SpO2: 98%     General appearance: alert, appears stated age, cooperative and in no distress  Head: normocephalic, without obvious abnormality, atraumatic  Eyes: conjunctivae/corneas clear; no drainage  Neck: symmetrical, trachea midline   Lungs: clear to auscultation bilaterally  Heart: regular rate and rhythm, S1, S2 normal  Abdomen: soft, non-tender; bowel sounds normal  Extremities: normal, atraumatic, no cyanosis or edema  Skin:  color, texture, turgor normal--no rashes or lesions      Mental Status: alert and oriented x 4    Appropriate attention/concentration  Intact fundus of knowledge  Repetition intact  Memories intact    Speech: no dysarthria  Language: no aphasias---reading, writing, repetition, and object identification intact    Cranial Nerves:  I: smell    II: visual acuity     II: visual fields Full    II: pupils MU   III,VII: ptosis None   III,IV,VI: extraocular muscles  EOMI without nystagmus   V: mastication Normal   V: facial light touch sensation  Normal   V,VII: corneal reflex     VII: facial muscle function - upper  Normal   VII: facial muscle function - lower Normal   VIII: hearing Normal   IX: soft palate elevation  Normal   IX,X: gag reflex    XI: trapezius strength  5/5   XI: sternocleidomastoid strength 5/5   XI: neck extension strength  5/5   XII: tongue strength  Normal     Motor:  5/5 throughout  Normal bulk and tone  No drift   No abnormal movements    Sensory:  LT normal    Coordination:   FN, FFM and ADDY normal  HS normal    Gait:  Normal    DTR:   2+ throughout     Laboratory/Radiology:  ry/Radiology:     No labs to review at this time    Assessment:     Short term memory loss likely due to severe DINO  --- CTH negative for acute findings  --- neuro exam non focal  --- we will obtain SLP cog evaluation for baseline  --- hx of stroke  --- has tried Aricept but had SE of GI upset     Plan:     Refilled his Losartan 100 mg daily     Referral to SLP for cognitive evaluation     Follow with Sleep med    Follow up in 6 months    Call with any questions or concerns      LEAH Robbins CNP  1:44 PM  11/16/2021

## 2021-11-24 ENCOUNTER — APPOINTMENT (OUTPATIENT)
Dept: GENERAL RADIOLOGY | Age: 71
End: 2021-11-24
Payer: MEDICARE

## 2021-11-24 ENCOUNTER — HOSPITAL ENCOUNTER (EMERGENCY)
Age: 71
Discharge: HOME | End: 2021-11-24
Payer: MEDICARE

## 2021-11-24 VITALS
RESPIRATION RATE: 16 BRPM | DIASTOLIC BLOOD PRESSURE: 87 MMHG | OXYGEN SATURATION: 97 % | HEART RATE: 83 BPM | SYSTOLIC BLOOD PRESSURE: 195 MMHG | TEMPERATURE: 97.1 F

## 2021-11-24 DIAGNOSIS — M79.672 LEFT FOOT PAIN: ICD-10-CM

## 2021-11-24 DIAGNOSIS — S93.602A SPRAIN OF LEFT FOOT, INITIAL ENCOUNTER: Primary | ICD-10-CM

## 2021-11-24 PROCEDURE — 99283 EMERGENCY DEPT VISIT LOW MDM: CPT

## 2021-11-24 PROCEDURE — 6370000000 HC RX 637 (ALT 250 FOR IP): Performed by: PHYSICIAN ASSISTANT

## 2021-11-24 PROCEDURE — 73630 X-RAY EXAM OF FOOT: CPT

## 2021-11-24 RX ORDER — HYDROCODONE BITARTRATE AND ACETAMINOPHEN 5; 325 MG/1; MG/1
1 TABLET ORAL ONCE
Status: COMPLETED | OUTPATIENT
Start: 2021-11-24 | End: 2021-11-24

## 2021-11-24 RX ADMIN — HYDROCODONE BITARTRATE AND ACETAMINOPHEN 1 TABLET: 5; 325 TABLET ORAL at 12:07

## 2021-11-24 ASSESSMENT — PAIN SCALES - GENERAL: PAINLEVEL_OUTOF10: 8

## 2021-11-24 ASSESSMENT — PAIN DESCRIPTION - ORIENTATION: ORIENTATION: LEFT

## 2021-11-24 ASSESSMENT — PAIN DESCRIPTION - PAIN TYPE: TYPE: ACUTE PAIN

## 2021-11-24 NOTE — ED PROVIDER NOTES
[facility]     Department of Emergency Medicine   ED  Provider Note  Admit Date/RoomTime: 11/24/2021 12:03 PM  ED Room: 36/36    Chief Complaint   Foot Pain (left foot, no knonw injury, )    History of Present Illness      Atul Castillo is a 66 y.o. old male who presents to the emergency department for left foot pain that began last night. He denies any injury or trauma. He states the pain is worse with bearing weight to his left foot. He states he believes he may have hurt his foot while walking down the steps at his home. He denies any calf tenderness or swelling. He has no numbness/tingling or sensation changes. Patient denies any chest pain or shortness of breath. He denies any wounds. Patient also denies pain to his right lower extremity. Patient is a diabetic. He is alert oriented x3 and in no apparent distress at this exam.  Patient is nontoxic-appearing. ROS   Pertinent positives and negatives are stated within HPI, all other systems reviewed and are negative. Past Medical History:   Past Medical History:   Diagnosis Date    Diabetes ([facility] [address] 2002    Hypertension 2015      Past Surgical History:  has no past surgical history on file. Social History:  reports that he has never smoked. He has never used smokeless tobacco. He reports that he does not drink alcohol and does not use drugs. Family History: family history is not on file. Allergies: Patient has no known allergies. Physical Exam     Vitals:    11/24/21 1201   BP: (!) 195/87   Pulse: 83   Resp: 16   Temp: 97.1 °F (36.2 °C)   SpO2: 97%   Oxygen Saturation Interpretation: Normal.    Constitutional:  Alert and oriented x3, development consistent with age. NAD  HEENT:  NC/NT. Airway patent. Neck:  Normal ROM. Supple. Non-tender  CVS: Regular rate for age, normal rhythm  Resp: Clear and equal bilaterally with good airflow, no respiratory distress  Back: No lumbar tenderness. Lower Extremity:  Left: foot. Tenderness: Mild to arch             Swelling: None. Deformity: No.             ROM: full range with pain. Skin:  no erythema, rash or wounds noted, compartments soft and compressible, no diabetic ulcers or wounds noted to plantar aspect, no bruising        Distal Function:              Motor deficit: none. Sensory deficit: none. Intact distally            Pulse deficit: none. Strong pedal            Capillary refill: normal.  Integument:  Normal turgor. Warm, dry, without visible rash, unless noted elsewhere. Neurological: Motor functions intact. Lab / Imaging Results   (All laboratory and radiology results have been personally reviewed by myself)  Labs:  No results found for this visit on 11/24/21. Imaging: All Radiology results interpreted by Radiologist unless otherwise noted. 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 Mild hallux valgus           ED Course / Medical Decision Making     Medications   HYDROcodone-acetaminophen (NORCO) 5-325 MG per tablet 1 tablet (1 tablet Oral Given 11/24/21 1207)      Consult(s):  None    Procedure(s):  none    MDM:      Films were obtained based on low suspicion for bony injury as per history/physical findings . Plan is subsequently for symptom control, limited use and  immobilization with appropriate outpatient follow-up. Counseling: The emergency provider has spoken with the patient or caregiver and discussed todays results, in addition to providing specific details for the plan of care and counseling regarding the diagnosis and prognosis. Questions are answered at this time and they are agreeable with the plan. Patient understands they must follow-up with PCP and/or podiatry. RICE discussed. They were educated on signs and symptoms that would require emergent return.  Patient was educated on newly prescribed medications. They were also instructed on ace wrap use and care. Assessment      1. Sprain of left foot, initial encounter    2. Left foot pain      Plan   Discharge to home  Patient condition is good    New Medications     New Prescriptions    DICLOFENAC SODIUM (VOLTAREN) 1 % GEL    Apply 2 g topically 2 times daily     Electronically signed by Zackary Gonzalez PA-C   DD: 11/24/21    **This report was transcribed using voice recognition software. Every effort was made to ensure accuracy; however, inadvertent computerized transcription errors may be present.     END OF ED PROVIDER NOTE        Zackary Gonzalez PA-C  11/24/21 5108

## 2021-11-29 LAB — DIABETIC RETINOPATHY: NEGATIVE

## 2021-12-15 LAB — SARS-COV-2: NORMAL

## 2022-01-19 LAB — SARS-COV-2: NORMAL

## 2022-01-29 ENCOUNTER — HOSPITAL ENCOUNTER (EMERGENCY)
Age: 72
Discharge: HOME | End: 2022-01-29
Attending: EMERGENCY MEDICINE
Payer: MEDICARE

## 2022-01-29 ENCOUNTER — APPOINTMENT (OUTPATIENT)
Dept: GENERAL RADIOLOGY | Age: 72
End: 2022-01-29
Payer: MEDICARE

## 2022-01-29 VITALS
SYSTOLIC BLOOD PRESSURE: 148 MMHG | RESPIRATION RATE: 18 BRPM | OXYGEN SATURATION: 95 % | WEIGHT: 195 LBS | TEMPERATURE: 100.3 F | BODY MASS INDEX: 29.55 KG/M2 | HEART RATE: 78 BPM | DIASTOLIC BLOOD PRESSURE: 67 MMHG | HEIGHT: 68 IN

## 2022-01-29 DIAGNOSIS — U07.1 COVID-19: Primary | ICD-10-CM

## 2022-01-29 LAB
ALBUMIN SERPL-MCNC: 4 G/DL (ref 3.5–5.2)
ALP BLD-CCNC: 63 U/L (ref 40–129)
ALT SERPL-CCNC: 32 U/L (ref 0–40)
ANION GAP SERPL CALCULATED.3IONS-SCNC: 13 MMOL/L (ref 7–16)
AST SERPL-CCNC: 34 U/L (ref 0–39)
BASOPHILS ABSOLUTE: 0.01 E9/L (ref 0–0.2)
BASOPHILS RELATIVE PERCENT: 0.2 % (ref 0–2)
BILIRUB SERPL-MCNC: 0.3 MG/DL (ref 0–1.2)
BUN BLDV-MCNC: 17 MG/DL (ref 6–23)
C-REACTIVE PROTEIN: 12.7 MG/DL (ref 0–0.4)
CALCIUM SERPL-MCNC: 9.2 MG/DL (ref 8.6–10.2)
CHLORIDE BLD-SCNC: 100 MMOL/L (ref 98–107)
CO2: 26 MMOL/L (ref 22–29)
CREAT SERPL-MCNC: 0.8 MG/DL (ref 0.7–1.2)
D DIMER: 232 NG/ML DDU
EOSINOPHILS ABSOLUTE: 0 E9/L (ref 0.05–0.5)
EOSINOPHILS RELATIVE PERCENT: 0 % (ref 0–6)
GFR AFRICAN AMERICAN: >60
GFR NON-AFRICAN AMERICAN: >60 ML/MIN/1.73
GLUCOSE BLD-MCNC: 150 MG/DL (ref 74–99)
HCT VFR BLD CALC: 38.1 % (ref 37–54)
HEMOGLOBIN: 12.5 G/DL (ref 12.5–16.5)
IMMATURE GRANULOCYTES #: 0.04 E9/L
IMMATURE GRANULOCYTES %: 0.9 % (ref 0–5)
LYMPHOCYTES ABSOLUTE: 0.7 E9/L (ref 1.5–4)
LYMPHOCYTES RELATIVE PERCENT: 16.5 % (ref 20–42)
MAGNESIUM: 1.7 MG/DL (ref 1.6–2.6)
MCH RBC QN AUTO: 29.8 PG (ref 26–35)
MCHC RBC AUTO-ENTMCNC: 32.8 % (ref 32–34.5)
MCV RBC AUTO: 90.9 FL (ref 80–99.9)
MONOCYTES ABSOLUTE: 0.47 E9/L (ref 0.1–0.95)
MONOCYTES RELATIVE PERCENT: 11.1 % (ref 2–12)
NEUTROPHILS ABSOLUTE: 3.02 E9/L (ref 1.8–7.3)
NEUTROPHILS RELATIVE PERCENT: 71.3 % (ref 43–80)
PDW BLD-RTO: 11.8 FL (ref 11.5–15)
PLATELET # BLD: 234 E9/L (ref 130–450)
PMV BLD AUTO: 9.2 FL (ref 7–12)
POTASSIUM SERPL-SCNC: 4.6 MMOL/L (ref 3.5–5)
PROCALCITONIN: 0.08 NG/ML (ref 0–0.08)
RBC # BLD: 4.19 E12/L (ref 3.8–5.8)
SEDIMENTATION RATE, ERYTHROCYTE: 105 MM/HR (ref 0–15)
SODIUM BLD-SCNC: 139 MMOL/L (ref 132–146)
TOTAL PROTEIN: 7.3 G/DL (ref 6.4–8.3)
TROPONIN, HIGH SENSITIVITY: 10 NG/L (ref 0–11)
TROPONIN, HIGH SENSITIVITY: 13 NG/L (ref 0–11)
WBC # BLD: 4.2 E9/L (ref 4.5–11.5)

## 2022-01-29 PROCEDURE — 99283 EMERGENCY DEPT VISIT LOW MDM: CPT

## 2022-01-29 PROCEDURE — 80053 COMPREHEN METABOLIC PANEL: CPT

## 2022-01-29 PROCEDURE — 85025 COMPLETE CBC W/AUTO DIFF WBC: CPT

## 2022-01-29 PROCEDURE — 71046 X-RAY EXAM CHEST 2 VIEWS: CPT

## 2022-01-29 PROCEDURE — 84484 ASSAY OF TROPONIN QUANT: CPT

## 2022-01-29 PROCEDURE — 86140 C-REACTIVE PROTEIN: CPT

## 2022-01-29 PROCEDURE — 85378 FIBRIN DEGRADE SEMIQUANT: CPT

## 2022-01-29 PROCEDURE — 84145 PROCALCITONIN (PCT): CPT

## 2022-01-29 PROCEDURE — 93005 ELECTROCARDIOGRAM TRACING: CPT | Performed by: NURSE PRACTITIONER

## 2022-01-29 PROCEDURE — 6370000000 HC RX 637 (ALT 250 FOR IP): Performed by: STUDENT IN AN ORGANIZED HEALTH CARE EDUCATION/TRAINING PROGRAM

## 2022-01-29 PROCEDURE — 83735 ASSAY OF MAGNESIUM: CPT

## 2022-01-29 PROCEDURE — 85651 RBC SED RATE NONAUTOMATED: CPT

## 2022-01-29 RX ORDER — ACETAMINOPHEN 325 MG/1
650 TABLET ORAL ONCE
Status: COMPLETED | OUTPATIENT
Start: 2022-01-29 | End: 2022-01-29

## 2022-01-29 RX ORDER — GUAIFENESIN 100 MG/5ML
200 SYRUP ORAL 3 TIMES DAILY PRN
COMMUNITY
End: 2022-11-01

## 2022-01-29 RX ADMIN — ACETAMINOPHEN 650 MG: 325 TABLET ORAL at 16:27

## 2022-01-29 ASSESSMENT — PAIN DESCRIPTION - PAIN TYPE
TYPE: ACUTE PAIN
TYPE: ACUTE PAIN

## 2022-01-29 ASSESSMENT — PAIN SCALES - GENERAL
PAINLEVEL_OUTOF10: 6
PAINLEVEL_OUTOF10: 7
PAINLEVEL_OUTOF10: 0

## 2022-01-29 ASSESSMENT — ENCOUNTER SYMPTOMS
BACK PAIN: 0
SHORTNESS OF BREATH: 0
SINUS PRESSURE: 0
NAUSEA: 0
SORE THROAT: 0
VOMITING: 0
ABDOMINAL PAIN: 0
DIARRHEA: 1
EYE DISCHARGE: 0
WHEEZING: 0
COUGH: 1
EYE PAIN: 0

## 2022-01-29 ASSESSMENT — PAIN DESCRIPTION - LOCATION
LOCATION: CHEST;BACK
LOCATION: CHEST;BACK

## 2022-01-29 NOTE — ED PROVIDER NOTES
76-year-old male presenting emerge department for positive for COVID-19, he is having issues with chest pain located on his right side goes through to his back, worsens with coughing, 6 out of 10 in severity, has been persistent, gradual in onset. Associated with fatigue and fevers, tested positive for Covid 10 days ago, he was unvaccinated. Was having issues with diarrhea no longer having issues, he is having body wide aches as well. Review of Systems   Constitutional: Positive for chills, fatigue and fever. HENT: Negative for ear pain, sinus pressure and sore throat. Eyes: Negative for pain and discharge. Respiratory: Positive for cough. Negative for shortness of breath and wheezing. Cardiovascular: Positive for chest pain. Gastrointestinal: Positive for diarrhea. Negative for abdominal pain, nausea and vomiting. Genitourinary: Negative for dysuria and frequency. Musculoskeletal: Positive for arthralgias. Negative for back pain. Skin: Negative for rash and wound. Neurological: Positive for headaches. Negative for weakness. Hematological: Negative for adenopathy. All other systems reviewed and are negative. Physical Exam  Vitals and nursing note reviewed. Constitutional:       Appearance: He is well-developed. HENT:      Head: Normocephalic and atraumatic. Right Ear: External ear normal.      Left Ear: External ear normal.      Nose: Nose normal.      Mouth/Throat:      Mouth: Mucous membranes are moist.   Eyes:      Extraocular Movements: Extraocular movements intact. Conjunctiva/sclera: Conjunctivae normal.      Pupils: Pupils are equal, round, and reactive to light. Cardiovascular:      Rate and Rhythm: Normal rate and regular rhythm. Heart sounds: Normal heart sounds. No murmur heard. Pulmonary:      Effort: Pulmonary effort is normal. No respiratory distress. Breath sounds: No wheezing or rales.       Comments: Diminished throughout. Abdominal:      General: Bowel sounds are normal.      Palpations: Abdomen is soft. Tenderness: There is no abdominal tenderness. There is no guarding or rebound. Musculoskeletal:         General: No tenderness or deformity. Cervical back: Normal range of motion and neck supple. Skin:     General: Skin is warm and dry. Neurological:      Mental Status: He is alert and oriented to person, place, and time. Cranial Nerves: No cranial nerve deficit. Coordination: Coordination normal.          Procedures     MDM     ED Course as of 01/29/22 2012   Sat Jan 29, 2022   1528 EKG: This EKG is signed by emergency department physician. Rate: 78  Rhythm: Sinus  Interpretation: non-specific EKG  Comparison: was normal and no previous EKG available      [JG]   1655 D-dimer 232, unlikely to have a PE [JG]   1724 Initial troponin XII, will check a delta [JG]   2005 Patient presented emerged part for positive COVID-19, EKG unremarkable, troponins were unremarkable, D-dimer two thirty-two, PE and ACS less likely, patient is not hypoxic with ambulation, chest x-ray did not show large amount infiltrates, he was discharged, short call his primary care physician, return precautions given [JG]      ED Course User Index  [JG] Elo Voss MD    Patient presented emerged part for positive COVID-19, EKG unremarkable, troponins were unremarkable, D-dimer two thirty-two, PE and ACS less likely, patient is not hypoxic with ambulation, chest x-ray did not show large amount infiltrates, he was discharged, short call his primary care physician, return precautions given     ED Course as of 01/29/22 2012   Sat Jan 29, 2022   1528 EKG: This EKG is signed by emergency department physician.     Rate: 78  Rhythm: Sinus  Interpretation: non-specific EKG  Comparison: was normal and no previous EKG available      [JG]   1655 D-dimer 232, unlikely to have a PE [JG]   1724 Initial troponin XII, will check a delta [J]   2005 Patient presented emerged part for positive COVID-19, EKG unremarkable, troponins were unremarkable, D-dimer two thirty-two, PE and ACS less likely, patient is not hypoxic with ambulation, chest x-ray did not show large amount infiltrates, he was discharged, short call his primary care physician, return precautions given [J]      ED Course User Index  [] Ed Post MD       --------------------------------------------- PAST HISTORY ---------------------------------------------  Past Medical History:  has a past medical history of Diabetes ([facility] [address] and Hypertension. Past Surgical History:  has no past surgical history on file. Social History:  reports that he has never smoked. He has never used smokeless tobacco. He reports that he does not drink alcohol and does not use drugs. Family History: family history is not on file. The patients home medications have been reviewed. Allergies: Patient has no known allergies.     -------------------------------------------------- RESULTS -------------------------------------------------  Labs:  Results for orders placed or performed during the hospital encounter of 01/29/22   Troponin   Result Value Ref Range    Troponin, High Sensitivity 13 (H) 0 - 11 ng/L   CBC Auto Differential   Result Value Ref Range    WBC 4.2 (L) 4.5 - 11.5 E9/L    RBC 4.19 3.80 - 5.80 E12/L    Hemoglobin 12.5 12.5 - 16.5 g/dL    Hematocrit 38.1 37.0 - 54.0 %    MCV 90.9 80.0 - 99.9 fL    MCH 29.8 26.0 - 35.0 pg    MCHC 32.8 32.0 - 34.5 %    RDW 11.8 11.5 - 15.0 fL    Platelets 164 709 - 892 E9/L    MPV 9.2 7.0 - 12.0 fL    Neutrophils % 71.3 43.0 - 80.0 %    Immature Granulocytes % 0.9 0.0 - 5.0 %    Lymphocytes % 16.5 (L) 20.0 - 42.0 %    Monocytes % 11.1 2.0 - 12.0 %    Eosinophils % 0.0 0.0 - 6.0 %    Basophils % 0.2 0.0 - 2.0 %    Neutrophils Absolute 3.02 1.80 - 7.30 E9/L    Immature Granulocytes # 0.04 E9/L    Lymphocytes Absolute 0.70 (L) 1.50 - 4.00 E9/L Monocytes Absolute 0.47 0.10 - 0.95 E9/L    Eosinophils Absolute 0.00 (L) 0.05 - 0.50 E9/L    Basophils Absolute 0.01 0.00 - 0.20 E9/L   Comprehensive Metabolic Panel   Result Value Ref Range    Sodium 139 132 - 146 mmol/L    Potassium 4.6 3.5 - 5.0 mmol/L    Chloride 100 98 - 107 mmol/L    CO2 26 22 - 29 mmol/L    Anion Gap 13 7 - 16 mmol/L    Glucose 150 (H) 74 - 99 mg/dL    BUN 17 6 - 23 mg/dL    CREATININE 0.8 0.7 - 1.2 mg/dL    GFR Non-African American >60 >=60 mL/min/1.73    GFR African American >60     Calcium 9.2 8.6 - 10.2 mg/dL    Total Protein 7.3 6.4 - 8.3 g/dL    Albumin 4.0 3.5 - 5.2 g/dL    Total Bilirubin 0.3 0.0 - 1.2 mg/dL    Alkaline Phosphatase 63 40 - 129 U/L    ALT 32 0 - 40 U/L    AST 34 0 - 39 U/L   D-Dimer, Quantitative   Result Value Ref Range    D-Dimer, Quant 232 ng/mL DDU   Magnesium   Result Value Ref Range    Magnesium 1.7 1.6 - 2.6 mg/dL   Sedimentation Rate   Result Value Ref Range    Sed Rate 105 (H) 0 - 15 mm/Hr   Troponin   Result Value Ref Range    Troponin, High Sensitivity 10 0 - 11 ng/L   EKG 12 Lead   Result Value Ref Range    Ventricular Rate 78 BPM    Atrial Rate 78 BPM    P-R Interval 208 ms    QRS Duration 88 ms    Q-T Interval 368 ms    QTc Calculation (Bazett) 419 ms    P Axis 36 degrees    R Axis 62 degrees    T Axis 68 degrees       Radiology:  XR CHEST (2 VW)   Final Result   No acute process             ------------------------- NURSING NOTES AND VITALS REVIEWED ---------------------------  Date / Time Roomed:  1/29/2022  3:07 PM  ED Bed Assignment:  23/23    The nursing notes within the ED encounter and vital signs as below have been reviewed.    BP (!) 148/67   Pulse 78   Temp 100.3 °F (37.9 °C) (Temporal)   Resp 18   Ht 5' 8\" (1.727 m)   Wt 195 lb (88.5 kg)   SpO2 95%   BMI 29.65 kg/m²   Oxygen Saturation Interpretation: Normal      ------------------------------------------ PROGRESS NOTES ------------------------------------------  8:12 PM EST  I have spoken with the patient and discussed todays results, in addition to providing specific details for the plan of care and counseling regarding the diagnosis and prognosis. Their questions are answered at this time and they are agreeable with the plan. I discussed at length with them reasons for immediate return here for re evaluation. They will followup with their primary care physician by calling their office tomorrow. --------------------------------- ADDITIONAL PROVIDER NOTES ---------------------------------  At this time the patient is without objective evidence of an acute process requiring hospitalization or inpatient management. They have remained hemodynamically stable throughout their entire ED visit and are stable for discharge with outpatient follow-up. The plan has been discussed in detail and they are aware of the specific conditions for emergent return, as well as the importance of follow-up. Discharge Medication List as of 1/29/2022  7:16 PM          Diagnosis:  1. COVID-19        Disposition:  Patient's disposition: Discharge to home  Patient's condition is stable.              Willodean Kussmaul, MD  Resident  01/29/22 2013

## 2022-01-29 NOTE — ED NOTES
Pt presented to the ED from home with c/o chest pain and cough. Pt states he was dx with covid over 2 weeks ago. Pt states he still has chest pain that radiates. Pt states he has a cough and generalized body aches. EKG completed, blood work obtained and sent to lab.       Rc Maurice RN  01/29/22 2356

## 2022-01-30 LAB
EKG ATRIAL RATE: 78 BPM
EKG P AXIS: 36 DEGREES
EKG P-R INTERVAL: 208 MS
EKG Q-T INTERVAL: 368 MS
EKG QRS DURATION: 88 MS
EKG QTC CALCULATION (BAZETT): 419 MS
EKG R AXIS: 62 DEGREES
EKG T AXIS: 68 DEGREES
EKG VENTRICULAR RATE: 78 BPM

## 2022-01-30 NOTE — ED NOTES
Pt is given discharge paperwork and instructions, pt verbally understands discharge paperwork and instructions. Pt verbally understands how to use pulse ox. Pt completed a return demonstration on how to use pulse ox.  Pt ambulated to exit     [facility], [address] [place] [address]  01/29/22 9221

## 2022-01-31 ENCOUNTER — CARE COORDINATION (OUTPATIENT)
Dept: CARE COORDINATION | Age: 72
End: 2022-01-31

## 2022-01-31 NOTE — CARE COORDINATION
Date/Time:  1/31/2022 9:13 AM  Attempted to reach patient by telephone (with assistance of  [employer] #[id number]) . Call within 2 business days of discharge: Yes Left HIPPA compliant message requesting a return call. Will attempt to reach patient again.

## 2022-02-01 ENCOUNTER — CARE COORDINATION (OUTPATIENT)
Dept: CARE COORDINATION | Age: 72
End: 2022-02-01

## 2022-02-01 NOTE — CARE COORDINATION
Ambulatory Care Coordination ED COVID Follow up Call    Challenges to be reviewed by the provider   Additional needs identified to be addressed with provider: No  none                 Encounter was not routed to provider for escalation. Method of communication with provider: none    Discussed COVID-19 related testing which was: available at this time. Test results were: positive. Patient informed of results, if available? Yes. Current Symptoms: pain or aching joints, cough, loss of taste or smell, no new symptoms and no worsening symptoms    Reviewed New or Changed Meds: yes    Do you have what you need at home?  Durable Medical Equipment ordered at discharge: None   Home Health/Outpatient orders at discharge: none   Was patient discharged with a pulse oximeter? Yes Discussed and confirmed pulse oximeter discharge instructions and when to notify provider or seek emergency care. Patient states his pox reading is 98% at rest on room air. Patient education provided: Reviewed appropriate site of care based on symptoms and resources available to patient including: PCP, Urgent care clinics and When to call 911. Follow up appointment recommended: yes. If no appointment scheduled, scheduling offered: yes  Future Appointments   Date Time Provider Jennifer Parra   2/21/2022  2:00 PM LEAH Dela Cruz - CNP [facility]       Interventions: Obtained and reviewed discharge summary and/or continuity of care documents  Reviewed discharge instructions, medical action plan and red flags with patient who verbalized understanding. No further follow-up call indicated based on severity of symptoms and risk factors. Plan for next call: na  Provided contact information for future needs.     Cole Cratf RN

## 2022-02-14 LAB
ALBUMIN SERPL-MCNC: NORMAL G/DL
ALP BLD-CCNC: NORMAL U/L
ALT SERPL-CCNC: NORMAL U/L
ANION GAP SERPL CALCULATED.3IONS-SCNC: NORMAL MMOL/L
AST SERPL-CCNC: NORMAL U/L
AVERAGE GLUCOSE: 194
BILIRUB SERPL-MCNC: NORMAL MG/DL
BUN BLDV-MCNC: NORMAL MG/DL
CALCIUM SERPL-MCNC: NORMAL MG/DL
CHLORIDE BLD-SCNC: NORMAL MMOL/L
CHOLESTEROL, TOTAL: NORMAL
CHOLESTEROL/HDL RATIO: NORMAL
CO2: NORMAL
CREAT SERPL-MCNC: NORMAL MG/DL
GFR CALCULATED: NORMAL
GLUCOSE BLD-MCNC: NORMAL MG/DL
HBA1C MFR BLD: 8.4 %
HDLC SERPL-MCNC: NORMAL MG/DL
LDL CHOLESTEROL CALCULATED: NORMAL
NONHDLC SERPL-MCNC: NORMAL MG/DL
POTASSIUM SERPL-SCNC: NORMAL MMOL/L
SODIUM BLD-SCNC: NORMAL MMOL/L
TOTAL PROTEIN: NORMAL
TRIGL SERPL-MCNC: NORMAL MG/DL
VLDLC SERPL CALC-MCNC: NORMAL MG/DL

## 2022-02-21 ENCOUNTER — OFFICE VISIT (OUTPATIENT)
Dept: NEUROLOGY | Age: 72
End: 2022-02-21
Payer: MEDICARE

## 2022-02-21 VITALS
OXYGEN SATURATION: 99 % | HEART RATE: 78 BPM | SYSTOLIC BLOOD PRESSURE: 170 MMHG | DIASTOLIC BLOOD PRESSURE: 79 MMHG | TEMPERATURE: 97.6 F

## 2022-02-21 DIAGNOSIS — R41.841 COGNITIVE COMMUNICATION DISORDER: Primary | ICD-10-CM

## 2022-02-21 PROCEDURE — G8427 DOCREV CUR MEDS BY ELIG CLIN: HCPCS | Performed by: NURSE PRACTITIONER

## 2022-02-21 PROCEDURE — G8484 FLU IMMUNIZE NO ADMIN: HCPCS | Performed by: NURSE PRACTITIONER

## 2022-02-21 PROCEDURE — 4040F PNEUMOC VAC/ADMIN/RCVD: CPT | Performed by: NURSE PRACTITIONER

## 2022-02-21 PROCEDURE — G8417 CALC BMI ABV UP PARAM F/U: HCPCS | Performed by: NURSE PRACTITIONER

## 2022-02-21 PROCEDURE — 3017F COLORECTAL CA SCREEN DOC REV: CPT | Performed by: NURSE PRACTITIONER

## 2022-02-21 PROCEDURE — 1123F ACP DISCUSS/DSCN MKR DOCD: CPT | Performed by: NURSE PRACTITIONER

## 2022-02-21 PROCEDURE — 99214 OFFICE O/P EST MOD 30 MIN: CPT | Performed by: NURSE PRACTITIONER

## 2022-02-21 PROCEDURE — 1036F TOBACCO NON-USER: CPT | Performed by: NURSE PRACTITIONER

## 2022-02-21 NOTE — PROGRESS NOTES
[address] [facility]. [place] ., M.D., F.A.C.P. Rupali Parker, DNP, APRN, ACNS-BC  Cachorro Blake. Fariba Cheema, MSN, APRN-FNP-C  Jonathan Calvin, MSN, APRN-FNP-C  MCKENZIE Mustafa, PA-C  Janna Mitchell, MSN, APRN-FNP-C  [address], [place] [address]  [place], [address]  Phone: [phone]  Fax: [phone]       Latha  is a 66 y.o. right handed male     Patient follows for short term memory loss and history of stroke     Patient is Irish speaking     Onset was several years ago as he was seeing a Neurologist in [facility].  He has noticed difficulty getting his words out and remembering things that were just told to him. He has no issues with ADL's. He is able to dress himself and care for himself. He has gotten lost twice while driving. He has forgotten his bank PIN number a few times which worries him. His wife has noticed some memory loss as well. He has not been started on any new medications. He was recently seen by Sleep medicine and has been diagnosed with DINO. His report indicates that he does meet criteria for diagnosis of severe DINO syndrome with apnea/hypoapnea index. He has a follow up appt with Sleep on 5/29/2021 and would benefit from CPAP titration and would avoid sedation on this patient. He was started on Aricept by his PCP but patient could not tolerate and it was discontinued due to GI upset. Today patient is accompanied by his wife and daughter. Patient was under the impression that his cognitive evaluation was today at his office visit. Advised patient that his cognitive evaluation would be done with speech therapy. Patient also waiting to have his sleep study done. Patient had COVID and was hospitalized few months ago and is now on oxygen. Patient notes since having COVID he has not been the same.     No issues with chewing or swallowing  No chest pain or palpitations  No SOB  No vertigo, lightheadedness or loss of consciousness  No falls, tripping or stumbling  No incontinence of bowels or bladder  No itching or bruising appreciated  No numbness, tingling or focal arm/leg weakness    ROS is otherwise negative    Objective:     Vitals:    02/21/22 1434   BP: (!) 170/79   Site: Right Upper Arm   Pulse: 78   Temp: 97.6 °F (36.4 °C)   SpO2: 99%     General appearance: alert, appears stated age, cooperative and in no distress  Head: normocephalic, without obvious abnormality, atraumatic  Eyes: conjunctivae/corneas clear; no drainage  Neck: symmetrical, trachea midline   Lungs: clear to auscultation bilaterally  Heart: regular rate and rhythm, S1, S2 normal  Abdomen: soft, non-tender; bowel sounds normal  Extremities: normal, atraumatic, no cyanosis or edema  Skin:  color, texture, turgor normal--no rashes or lesions      Mental Status: alert and oriented x 4    Appropriate attention/concentration  Intact fundus of knowledge  Repetition intact  Memories intact    Speech: no dysarthria  Language: no aphasias---reading, writing, repetition, and object identification intact    Cranial Nerves:  I: smell    II: visual acuity     II: visual fields Full    II: pupils MU   III,VII: ptosis None   III,IV,VI: extraocular muscles  EOMI without nystagmus   V: mastication Normal   V: facial light touch sensation  Normal   V,VII: corneal reflex     VII: facial muscle function - upper  Normal   VII: facial muscle function - lower Normal   VIII: hearing Normal   IX: soft palate elevation  Normal   IX,X: gag reflex    XI: trapezius strength  5/5   XI: sternocleidomastoid strength 5/5   XI: neck extension strength  5/5   XII: tongue strength  Normal     Motor:  5/5 throughout  Normal bulk and tone  No drift   No abnormal movements    Sensory:  LT normal    Coordination:   FN, FFM and ADDY normal    Gait:  Normal    DTR:   2+ throughout     Laboratory/Radiology:  ry/Radiology:     No labs to review at this time    Assessment:     Short term memory loss likely due to severe DINO  --- CTH negative for acute findings  --- neuro exam non focal  --- we will obtain SLP cog evaluation for baseline  --- hx of stroke  --- has tried Aricept but had SE of GI upset     Plan:     Referral to SLP for cognitive evaluation     Follow with Sleep med    Follow up in 3 months    Call with any questions or concerns      LEAH Mcallister CNP  2:31 PM  2/21/2022

## 2022-05-13 ENCOUNTER — HOSPITAL ENCOUNTER (EMERGENCY)
Age: 72
Discharge: HOME | End: 2022-05-13
Attending: EMERGENCY MEDICINE
Payer: MEDICARE

## 2022-05-13 VITALS
RESPIRATION RATE: 16 BRPM | SYSTOLIC BLOOD PRESSURE: 176 MMHG | HEIGHT: 68 IN | DIASTOLIC BLOOD PRESSURE: 74 MMHG | OXYGEN SATURATION: 97 % | HEART RATE: 68 BPM | WEIGHT: 195 LBS | BODY MASS INDEX: 29.55 KG/M2 | TEMPERATURE: 97.9 F

## 2022-05-13 DIAGNOSIS — T14.8XXA BLEEDING FROM WOUND: Primary | ICD-10-CM

## 2022-05-13 PROCEDURE — 99283 EMERGENCY DEPT VISIT LOW MDM: CPT

## 2022-05-13 PROCEDURE — 2500000003 HC RX 250 WO HCPCS: Performed by: PHYSICIAN ASSISTANT

## 2022-05-13 RX ORDER — LIDOCAINE HYDROCHLORIDE AND EPINEPHRINE 10; 10 MG/ML; UG/ML
20 INJECTION, SOLUTION INFILTRATION; PERINEURAL ONCE
Status: COMPLETED | OUTPATIENT
Start: 2022-05-13 | End: 2022-05-13

## 2022-05-13 RX ORDER — TRANEXAMIC ACID 100 MG/ML
INJECTION, SOLUTION INTRAVENOUS
Status: DISCONTINUED
Start: 2022-05-13 | End: 2022-05-13 | Stop reason: WASHOUT

## 2022-05-13 RX ADMIN — LIDOCAINE HYDROCHLORIDE AND EPINEPHRINE 20 ML: 10; 10 INJECTION, SOLUTION INFILTRATION; PERINEURAL at 19:52

## 2022-05-13 ASSESSMENT — ENCOUNTER SYMPTOMS
COLOR CHANGE: 0
CHEST TIGHTNESS: 0
COUGH: 0
SHORTNESS OF BREATH: 0

## 2022-05-13 ASSESSMENT — PAIN SCALES - GENERAL: PAINLEVEL_OUTOF10: 7

## 2022-05-14 NOTE — ED NOTES
Discharge instructions given. Patient verbalizes understanding. No other noted or stated problems at this time. Patient will follow up with surgeon.      Nicolasa Junior RN  05/13/22 2032

## 2022-05-14 NOTE — ED PROVIDER NOTES
ED Attending shared visit  CC: No          Department of Emergency Medicine   ED  Provider Note  Admit Date/RoomTime: 5/13/2022  7:28 PM  ED Room: 37/37  HPI:  5/13/22, Time: 8:06 PM EDT      The patient is a 74-year-old male presenting to the emergency department with continuous bleeding from his chest after having an abscess drained 2 days ago. Patient states that it has been bleeding mildly since he had the procedure. They are instructed to remove the packing after 48 hours. The daughter states after she remove the packing earlier today the bleeding became much heavier and she has not been able to get it to stop for several hours. Patient does take aspirin and last took a dose today. He is not having any chest pain, shortness of breath, dizziness, fevers or chills, purulent drainage from the wound. The history is provided by the patient and a relative. No  was used. REVIEW OF SYSTEMS:  Review of Systems   Constitutional: Negative for activity change, chills, fatigue and fever. Respiratory: Negative for cough, chest tightness and shortness of breath. Cardiovascular: Negative for chest pain, palpitations and leg swelling. Musculoskeletal: Negative for arthralgias, myalgias, neck pain and neck stiffness. Skin: Positive for wound. Negative for color change, pallor and rash. Neurological: Negative for dizziness, weakness, light-headedness and headaches. Hematological: Does not bruise/bleed easily. Psychiatric/Behavioral: Negative for agitation, behavioral problems and confusion. Pertinent positives and negatives are stated within HPI, all other systems reviewed and are negative.      --------------------------------------------- PAST HISTORY ---------------------------------------------  Past Medical History:  has a past medical history of Diabetes ([facility] [address] and Hypertension. Past Surgical History:  has no past surgical history on file.     Social History: reports that he has never smoked. He has never used smokeless tobacco. He reports that he does not drink alcohol and does not use drugs. Family History: family history is not on file. The patients home medications have been reviewed. Allergies: Patient has no known allergies. -------------------------------------------------- RESULTS -------------------------------------------------  All laboratory and radiology results have been personally reviewed by myself   LABS:  No results found for this visit on 05/13/22. RADIOLOGY:  Interpreted by Radiologist.  No orders to display       ------------------------- NURSING NOTES AND VITALS REVIEWED ---------------------------   The nursing notes within the ED encounter and vital signs as below have been reviewed. BP (!) 176/74   Pulse 68   Temp 97.9 °F (36.6 °C) (Temporal)   Resp 16   Ht 5' 8\" (1.727 m)   Wt 195 lb (88.5 kg)   SpO2 97%   BMI 29.65 kg/m²   Oxygen Saturation Interpretation: Normal      ---------------------------------------------------PHYSICAL EXAM--------------------------------------    Physical Exam  Vitals and nursing note reviewed. Constitutional:       General: He is not in acute distress. Appearance: Normal appearance. He is well-developed. HENT:      Head: Normocephalic and atraumatic. Mouth/Throat:      Mouth: Mucous membranes are moist.   Cardiovascular:      Rate and Rhythm: Normal rate and regular rhythm. Heart sounds: Normal heart sounds. No murmur heard. Pulmonary:      Effort: Pulmonary effort is normal. No respiratory distress. Breath sounds: Normal breath sounds. Comments: 1 cm circular Midsternal open wound with clot at the surface. Slow but steady bleeding from the wound. No surrounding edema, erythema, streaking or fluctuance. Musculoskeletal:         General: No swelling, tenderness or deformity. Normal range of motion. Cervical back: Normal range of motion and neck supple. No rigidity. Skin:     General: Skin is warm and dry. Capillary Refill: Capillary refill takes less than 2 seconds. Findings: No erythema. Neurological:      General: No focal deficit present. Mental Status: He is alert and oriented to person, place, and time. Mental status is at baseline. Coordination: Coordination normal.   Psychiatric:         Mood and Affect: Mood normal.         Behavior: Behavior normal.         Thought Content: Thought content normal.            ------------------------------ ED COURSE/MEDICAL DECISION MAKING----------------------  Medications   lidocaine-EPINEPHrine 1 %-1:[id number] injection 20 mL (20 mLs IntraDERmal Given by Other 5/13/22 1952)   oxidized cellulose external pads 1 each (1 each Topical Given 5/13/22 2032)         ED COURSE:     No orders to display     No orders to display       Procedures:  Procedures     Medical Decision Making:   MDM   21-year-old male presenting to the emergency department with continuous bleeding from a wound where he had an abscess drained from his sternum. This was drained 2 days ago. Bleeding increased after having packing removed. Pt does take aspirin and took last dose today. The wound was irrigated with Hibiclens. Lidocaine with epinephrine was injected and the bleeding did mostly stop. A new dressing was applied with Surgicel. The case was discussed with Dr. Yash Arrington who did the I&D. He is agreeable with the wound management and instructed patient aspirin and call the office on Monday to schedule sooner follow-up. Patient and his daughter were educated on the plan and agreeable. They are discharged home in good condition. Counseling: The emergency provider has spoken with the patient and family member patient and daughter and discussed todays results, in addition to providing specific details for the plan of care and counseling regarding the diagnosis and prognosis.   Questions are answered at this time and they are agreeable with the plan.      --------------------------------- IMPRESSION AND DISPOSITION ---------------------------------    IMPRESSION  1. Bleeding from wound        DISPOSITION  Disposition: Discharge to home  Patient condition is good      Electronically signed by Andrea Carlos PA-C   DD: 5/13/22  **This report was transcribed using voice recognition software. Every effort was made to ensure accuracy; however, inadvertent computerized transcription errors may be present.   END OF ED PROVIDER NOTE          Andrea Carlos PA-C  05/13/22 5949

## 2022-05-16 ENCOUNTER — HOSPITAL ENCOUNTER (OUTPATIENT)
Dept: SPEECH THERAPY | Age: 72
Setting detail: THERAPIES SERIES
Discharge: HOME | End: 2022-05-16
Payer: MEDICARE

## 2022-05-16 PROCEDURE — 96125 COGNITIVE TEST BY HC PRO: CPT

## 2022-05-16 NOTE — PROGRESS NOTES
Understanding Carbohydrates, Fats, and Protein  Food is a source of fuel and nourishment for your body. Its also a source of pleasure. Having diabetes doesnt mean you have to eat special foods or give up desserts. Instead, your dietitian can show you how to plan meals to suit your body. To start, learn how different foods affect blood sugar.  Carbohydrates  Carbohydrates are the main source of fuel for the body. Carbohydrates raise blood sugar. Many people think carbohydrates are only found in pasta or bread. But carbohydrates are actually in many kinds of foods:  · Sugars occur naturally in foods such as fruit, milk, honey, and molasses. Sugars can also be added to many foods, from cereals and yogurt to candy and desserts. Sugars raise blood sugar.  · Starches are found in bread, cereals, pasta, and dried beans. Theyre also found in corn, peas, potatoes, yam, acorn squash, and butternut squash. Starches also raise blood sugar.   · Fiber is found in foods such as vegetables, fruits, beans, and whole grains. Unlike other carbs, fiber isnt digested or absorbed. So it doesnt raise blood sugar. In fact, fiber can help keep blood sugar from rising too fast. It also helps keep blood cholesterol at a healthy level.  Did you know?  Even though carbohydrates raise blood sugar, its best to have some in every meal. They are an important part of a healthy diet.   Fat  Fat is an energy source that can be stored until needed. Fat does not raise blood sugar. However, it can raise blood cholesterol, increasing the risk of heart disease. Fat is also high in calories, which can cause weight gain. Not all types of fat are the same.  More Healthy:  · Monounsaturated fats are mostly found in vegetable oils, such as olive, canola, and peanut oils. They are also found in avocados and some nuts. Monounsaturated fats are healthy for your heart. Thats because they lower LDL (unhealthy) cholesterol.  · Polyunsaturated fats are mostly  [address] [place] [address]  Outpatient Speech Therapy  Phone: [phone] Fax: [phone] INFORMATION PROCESSING ASSESSMENT-G (RIPA-G)   EVALUATION RESULTS and PLAN OF CARE  PATIENT NAME:  Belén Hernandez  (male)     MRN:  [id number]    :  1950  (66 y.o.)  STATUS:  Outpatient clinic   TODAY'S DATE:  2022  REFERRING PROVIDER:    PATRICA Blackmon        PROVIDER NPI:  [id number]  [facility]  Date of order:  22  EVALUATING THERAPIST: HOSSEIN Santos    CERTIFICATION/RECERTIFICATION PERIOD: 2022 to 22  INSURANCE PROVIDER:  Payor: Tor Yates / [facility] / Product Type: *No Product type* /      INSURANCE ID:  [id number] - (Medicare Managed)   SECONDARY INSURANCE (if applicable): MEDICAID [place]    CPT Codes  EVALUATION: 37448  standardized cognitive performance testing (per hour)    TREATMENT:  Requesting treatment authorization for  14 visits over 14 weeks focusing on the following CPT codes:      41045  therapeutic interventions that focus on cognitive function , initial  15 min  52603  therapeutic interventions that focus on cognitive function, each additional 15 min      REFERRING/TREATMENT DIAGNOSIS: Cognitive communication deficit [R41.841]          SPEECH THERAPY  PLAN OF CARE   The speech therapy  POC is established based on physician order, speech pathology diagnosis and results of clinical assessment     SPEECH PATHOLOGY DIAGNOSIS:    Mild cognitive deficit    Outpatient Speech Pathology intervention is recommended 1 time per week for the above certification period.     Conditions Requiring Skilled Therapeutic Intervention for speech, language and/or cognition  Cognitive linguistic impairment  Decreased safety awareness  Decreased short term memory  Decreased problem solving skills   Decreased thought found in vegetable oils, such as corn, safflower, and soybean oils. They are also found in some seeds, nuts, and fish. Polyunsaturated fats lower LDL (unhealthy) cholesterol. So, choosing them instead of saturated fats is healthy for your heart. Certain unsaturated fats can help lower triglycerides.   Less Healthy:  · Saturated fats are found in animal products, such as meat, poultry, whole milk, lard, and butter. Saturated fats raise LDL cholesterol and are not healthy for your heart.  · Hydrogenated oils and trans fats are formed when vegetable oils are processed into solid fats. They are found in many processed foods. Hydrogenated oils and trans fats raise LDL cholesterol and lower HDL (healthy) cholesterol. They are not healthy for your heart.  Protein  Protein helps the body build and repair muscle and other tissue. Protein has little or no effect on blood sugar. However, many foods that contain protein also contain saturated fat. By choosing low-fat protein sources, you can get the benefits of protein without the extra fat:  · Plant protein is found in dry beans and peas, nuts, and soy products, such as tofu and soymilk. These sources tend to be cholesterol-free and low in saturated fat.  · Animal protein is found in fish, poultry, meat, cheese, milk, and eggs. These contain cholesterol and can be high in saturated fat. Aim for lean, lower-fat choices.  Date Last Reviewed: 3/1/2016  © 8290-2062 [employer]. [address] [place] [address]. All rights reserved. This information is not intended as a substitute for professional medical care. Always follow your healthcare professional's instructions.      Hemorrhoids    Hemorrhoids are swollen and inflamed veins inside the rectum and near the anus. The rectum is the last several inches of the colon. The anus is the passage between the rectum and the outside of the body.  Causes  The veins can become swollen due to increased pressure in  organization    Specific Speech Therapy Interventions to Include: Therapeutic tasks for Cognition    Specific instructions for next treatment: To complete the RIPA-G    SHORT/LONG TERM GOALS  1. Patient will complete the RIPA-G.  2.  Patient will improve immediate memory for functional tasks such as recording phone messages to a supervisory assistance level with greater than 90% accuracy and the use of compensatory aids. 3.  Patient will improve recent memory including temporal/spatial orientation to a supervisory assistance level with greater than 90% accuracy with the use of a   4. Patient will complete the Assessment for Language Related Functional Activities   5. Patient will identify and spontaneously use compensatory techniques to assist with memory with greater than 90% accuracy  6. Patient will improve problem solving for functional activities such as financial, medication, and schedule management to a supervisory assistance level with greater than 90% accuracy and the use of compensatory aids/strategies. Patient goals: Patient/family involved in developing goals and treatment plan:   Treatment goals discussed with Patient and Family    The Patient and Family understand(s) the diagnosis, prognosis and plan of care   The patient/family Agreed with above,     This plan may be re-evaluated and revised as warranted. Rehabilitation Potential/Prognosis: good                           Stimulus questions were obtained from the RIPA-G. At the time of initial evaluation on 5/16/22, a [place] tele- was used for the assessment.        Ross Information Processing Assessment-Geriatric:    Subtest Raw Score Percentile Standard Score Severity          Immediate Memory 23 25 8 Severe-  moderate   Recent Memory 29 95 15 WFL   Temporal Orientation 30 95 15 WFL   Spatial Orientation 26 63 11 moderate   Orientation to Environment 27 75 12 Moderate-  mild   Recall of General Information 27 75 12 Moderate-  mild   Problem Solving & Abstract Reasoning 30 95 15 [facility]   Organization of Information 27 91 14 [facility]   Auditory Processing & Comprehension DNT      Problem Solving and [place] Reasoning DNT      Naming Common Objects DNT      Functional Oral Reading DNT        Composites Sum of Standard Scores Quotients Percentile Rank Severity Rating   Information Processing incomplete      Memory 35 111 77 mild   Orientation 38 117 87 mild   Problem Solving incomplete          VARIANT OBSERVATIONS:     Present Absent   Error (e) x    Perseveration (p)  x   Repeat Instructions/Stimulus (r) x    Denial/Refusal (d) x    Delayed Response (dl) x    Confabulation (c)  x   Partially Correct (pc) x    Irrelevant (i)  x   Tangential (t)  x   Self-corrected (sc) x                                    EDUCATION:   The Speech Language Pathologist (SLP) completed education regarding results of evaluation and that intervention is warranted at this time. Learner: Patient  Education: Reviewed results and recommendations of this evaluation  Evaluation of Education:  Medardo Valles understanding    This plan may be re-evaluated and revised as warranted. Treatment goals discussed with Patient and Family   The Patient and Family understand(s) the diagnosis, prognosis and plan of care     Evaluation Time includes thorough review of current medical information, gathering information on past medical history/social history and prior level of function, completion of standardized testing/informal observation of tasks, assessment of data and education on plan of care and goals. The admitting diagnosis and active problem list, as listed below have been reviewed prior to initiation of this evaluation. ACTIVE PROBLEM LIST: There is no problem list on file for this patient. Mercedes Price M.S., CCC-SLP/L  Speech-Language Pathologist      [address]     Phone: [phone]     If you have any them. This is most often caused by:  · Chronic constipation or diarrhea  · Straining when having a bowel movement  · Sitting too long on the toilet  · A low-fiber diet  · Pregnancy  Symptoms  · Bleeding from the rectum (this may be noticeable after bowel movements)  · Lump near the anus  · Itching around the anus  · Pain around the anus  There are different types of hemorrhoids. Depending on the type you have and the severity, you may be able to treat yourself at home. In some cases, a procedure may be the best treatment option. Your healthcare provider can tell you more about this, if needed.  Home care  General care  · To get relief from pain or itching, try:  ¨ Topical products. Your healthcare provider may prescribe or recommend creams, ointments, or pads that can be applied to the hemorrhoid. Use these exactly as directed.  ¨ Medicines. Your healthcare provider may recommend stool softeners, suppositories, or laxatives to help manage constipation. Use these exactly as directed.  ¨ Sitz baths. A sitz bath involves sitting in a few inches of warm bath water. Be careful not to make the water so hot that you burn yourself--test it before sitting in it. Soak for about 10 to 15 minutes a few times a day. This may help relieve pain.  Tips to help prevent hemorrhoids  · Eat more fiber. Fiber adds bulk to stool and absorbs water as it moves through your colon. This makes stool softer and easier to pass.  ¨ Increase the fiber in your diet with more fiber-rich foods. These include fresh fruit, vegetables, and whole grains.  ¨ Take a fiber supplement or bulking agent, if advised to by your provider. These include products such as psyllium or methylcellulose.  · Drink plenty of water, if directed to by your provider. This can help keep stool soft.  · Be more active. Frequent exercise aids digestion and helps prevent constipation. It may also help make bowel movements more regular.  · Dont strain during bowel movements.  This can make hemorrhoids more likely. Also, dont sit on the toilet for long periods of time.  Follow-up care  Follow up with your healthcare provider, or as advised. If a culture or imaging tests were done, you will be notified of the results when they are ready. This may take a few days or longer.  When to seek medical advice  Call your healthcare provider right away if any of these occur:  · Increased bleeding from the rectum  · Increased pain around the rectum or anus  · Weakness or dizziness  Call 911  Call 911 or return to the emergency department right away if any of these occur:  · Trouble breathing or swallowing  · Fainting or loss of consciousness  · Unusually fast heart rate  · Vomiting blood  · Large amounts of blood in stool  Date Last Reviewed: 6/22/2015  © 8176-9968 [employer]. [address] [place] [address], [place] [address]. All rights reserved. This information is not intended as a substitute for professional medical care. Always follow your healthcare professional's instructions.               questions or concerns, please don't hesitate to call. Thank you for your referral.    Physician/Provider Signature:________________________________Date:__________________  By signing above, the therapists plan is approved by the physician/provider. All patients under [employer] must be signed by physician/provider.

## 2022-05-23 ENCOUNTER — TELEPHONE (OUTPATIENT)
Dept: ADMINISTRATIVE | Age: 72
End: 2022-05-23

## 2022-05-23 NOTE — TELEPHONE ENCOUNTER
Pt's daughter called, needs to reschedule appointment for patient. No openings were available with provider. Please call daughter back. Thanks!

## 2022-05-24 ENCOUNTER — TELEPHONE (OUTPATIENT)
Dept: ENT CLINIC | Age: 72
End: 2022-05-24

## 2022-06-08 ENCOUNTER — HOSPITAL ENCOUNTER (OUTPATIENT)
Dept: SPEECH THERAPY | Age: 72
Setting detail: THERAPIES SERIES
Discharge: HOME | End: 2022-06-08
Payer: MEDICARE

## 2022-06-08 PROCEDURE — 97130 THER IVNTJ EA ADDL 15 MIN: CPT

## 2022-06-08 PROCEDURE — 97129 THER IVNTJ 1ST 15 MIN: CPT

## 2022-06-08 NOTE — PROGRESS NOTES
Patient was seen for cognitive therapy. He was accompanied to the session by his daughter. Temperature was taken prior to the session. Temperature was [facility] [place]. COVID-19 screening questionnaire was negative. Mask was worn by SLP. Patient wore a mask.  was utilized on the hospital approved saul. Chente was today's interpretor. The following was completed:    Stimulus questions were obtained from the [employer]. At the time of initial evaluation on 5/16/22, a [place] tele- was used for the assessment. Ross Information Processing Assessment-Geriatric:    Subtest Raw Score Percentile Standard Score Severity          Immediate Memory 23 25 8 Severe-  moderate   Recent Memory 29 95 15 WFL   Temporal Orientation 30 95 15 WFL   Spatial Orientation 26 63 11 moderate   Orientation to Environment 27 75 12 Moderate-  mild   Recall of General Information 27 75 12 Moderate-  mild   Problem Solving & Abstract Reasoning 30 95 15 WFL   Organization of Information 27 91 14 WFL   Auditory Processing & Comprehension 30 95 15 WFL   Problem Solving and [place] Reasoning 29 91 14 WFL   Naming Common Objects 30 91 14 [facility]   Functional Oral Reading 30 91 14 [facility]     Composites Sum of Standard Scores Quotients Percentile Rank Severity Rating   Information Processing 131 122 93 WF   Memory 35 111 77 mild   Orientation 38 117 87 mild   Problem Solving 43 128 97 WFL       VARIANT OBSERVATIONS:     Present Absent   Error (e) x    Perseveration (p)  x   Repeat Instructions/Stimulus (r) x    Denial/Refusal (d) x    Delayed Response (dl) x    Confabulation (c)  x   Partially Correct (pc) x    Irrelevant (i)  x   Tangential (t)  x   Self-corrected (sc) x      Continue plan of care. Treatment plan and goals can be found in the initial evaluation/progress report. Mercedes Cui M.S., CCC-SLP/L  Speech-Language Pathologist    CPT CODE:       98769  therapeutic interventions that focus on cognitive function , initial  15 min  74644  therapeutic interventions that focus on cognitive function, each additional 15 min

## 2022-06-22 ENCOUNTER — HOSPITAL ENCOUNTER (OUTPATIENT)
Dept: SPEECH THERAPY | Age: 72
Setting detail: THERAPIES SERIES
Discharge: HOME | End: 2022-06-22
Payer: MEDICARE

## 2022-06-22 PROCEDURE — 97130 THER IVNTJ EA ADDL 15 MIN: CPT

## 2022-06-22 PROCEDURE — 97129 THER IVNTJ 1ST 15 MIN: CPT

## 2022-06-22 NOTE — PROGRESS NOTES
30 minute cognitive session. Conducted by student SLP and supervised by managing SLP. Virtual  utilized. Therapy targeted the following:    Pt was introduced to the memory technique of verbal rehearsal to engage both visual and auditory memory. Pt utilized verbal rehearsal by recalling information from picture scenes. - Pt was provided with a picture to initially view visually and then was asked five questions about the details of the picture. Pt achieved 20% accuracy.  - Pt was then provided the same picture but encouraged to use verbal rehearsal to address the details of the picture. Pt was then asked the same five questions about the details of the picture and achieved 60% accuracy. Verbal rehearsal by recalling information from picture scenes will be addressed at the following session. Continue POC. Yamil Pack, SLP  Clinician    RAVEN WayS., CCC-SLP/L  Speech-Language Pathologist    CPT CODE:       19721  therapeutic interventions that focus on cognitive function , initial  15 min  11341  therapeutic interventions that focus on cognitive function, each additional 15 min

## 2022-06-27 ENCOUNTER — HOSPITAL ENCOUNTER (OUTPATIENT)
Dept: SPEECH THERAPY | Age: 72
Setting detail: THERAPIES SERIES
Discharge: HOME | End: 2022-06-27
Payer: MEDICARE

## 2022-06-27 PROCEDURE — 97129 THER IVNTJ 1ST 15 MIN: CPT

## 2022-06-27 PROCEDURE — 97130 THER IVNTJ EA ADDL 15 MIN: CPT

## 2022-06-27 NOTE — PROGRESS NOTES
30 minute cognitive session. Conducted by student SLP and supervised by managing SLP. Virtual  utilized. Pt's son was present and signed document to authorize medical release of information. Therapy targeted the following:    Pt practiced the memory technique of verbal rehearsal to engage both visual and auditory memory. Pt utilized verbal rehearsal by recalling information from picture scenes. - Pt was provided with a 3 pictures to view and verbally stated details of the picture. The picture was then removed and the pt was asked 5 questions from each picture. He recalled details to answer the questions with 73% accuracy. Pt stated that his wife is getting surgery soon so he did not have time to practice verbal rehearsal over the last week because they were busy getting the house ready for his wife after surgery. Pt's son stated that he heard pt use verbal rehearsal when talking on the phone. Continue POC. Monica Louise, SLP  Clinician    RAVEN ShahS., CCC-SLP/L  Speech-Language Pathologist    CPT CODE:       11575  therapeutic interventions that focus on cognitive function , initial  15 min  89527  therapeutic interventions that focus on cognitive function, each additional 15 min

## 2022-06-29 ENCOUNTER — APPOINTMENT (OUTPATIENT)
Dept: SPEECH THERAPY | Age: 72
End: 2022-06-29
Payer: MEDICARE

## 2022-07-11 ENCOUNTER — HOSPITAL ENCOUNTER (EMERGENCY)
Age: 72
Discharge: HOME | End: 2022-07-11
Attending: EMERGENCY MEDICINE
Payer: MEDICARE

## 2022-07-11 ENCOUNTER — HOSPITAL ENCOUNTER (OUTPATIENT)
Dept: SPEECH THERAPY | Age: 72
Setting detail: THERAPIES SERIES
Discharge: HOME | End: 2022-07-11
Payer: MEDICARE

## 2022-07-11 ENCOUNTER — APPOINTMENT (OUTPATIENT)
Dept: GENERAL RADIOLOGY | Age: 72
End: 2022-07-11
Payer: MEDICARE

## 2022-07-11 VITALS
BODY MASS INDEX: 28.79 KG/M2 | SYSTOLIC BLOOD PRESSURE: 174 MMHG | TEMPERATURE: 98.8 F | DIASTOLIC BLOOD PRESSURE: 83 MMHG | RESPIRATION RATE: 14 BRPM | HEIGHT: 68 IN | OXYGEN SATURATION: 97 % | HEART RATE: 76 BPM | WEIGHT: 190 LBS

## 2022-07-11 DIAGNOSIS — J06.9 ACUTE UPPER RESPIRATORY INFECTION: Primary | ICD-10-CM

## 2022-07-11 LAB
ANION GAP SERPL CALCULATED.3IONS-SCNC: 13 MMOL/L (ref 7–16)
BASOPHILS ABSOLUTE: 0.07 E9/L (ref 0–0.2)
BASOPHILS RELATIVE PERCENT: 0.9 % (ref 0–2)
BUN BLDV-MCNC: 14 MG/DL (ref 6–23)
CALCIUM SERPL-MCNC: 9.3 MG/DL (ref 8.6–10.2)
CHLORIDE BLD-SCNC: 101 MMOL/L (ref 98–107)
CO2: 24 MMOL/L (ref 22–29)
CREAT SERPL-MCNC: 0.7 MG/DL (ref 0.7–1.2)
EOSINOPHILS ABSOLUTE: 0.16 E9/L (ref 0.05–0.5)
EOSINOPHILS RELATIVE PERCENT: 2 % (ref 0–6)
GFR AFRICAN AMERICAN: >60
GFR NON-AFRICAN AMERICAN: >60 ML/MIN/1.73
GLUCOSE BLD-MCNC: 126 MG/DL (ref 74–99)
HCT VFR BLD CALC: 39.1 % (ref 37–54)
HEMOGLOBIN: 13.2 G/DL (ref 12.5–16.5)
IMMATURE GRANULOCYTES #: 0.04 E9/L
IMMATURE GRANULOCYTES %: 0.5 % (ref 0–5)
INFLUENZA A BY PCR: NOT DETECTED
INFLUENZA B BY PCR: NOT DETECTED
LYMPHOCYTES ABSOLUTE: 1.41 E9/L (ref 1.5–4)
LYMPHOCYTES RELATIVE PERCENT: 18 % (ref 20–42)
MCH RBC QN AUTO: 30.8 PG (ref 26–35)
MCHC RBC AUTO-ENTMCNC: 33.8 % (ref 32–34.5)
MCV RBC AUTO: 91.1 FL (ref 80–99.9)
MONOCYTES ABSOLUTE: 0.85 E9/L (ref 0.1–0.95)
MONOCYTES RELATIVE PERCENT: 10.9 % (ref 2–12)
NEUTROPHILS ABSOLUTE: 5.29 E9/L (ref 1.8–7.3)
NEUTROPHILS RELATIVE PERCENT: 67.7 % (ref 43–80)
PDW BLD-RTO: 12.2 FL (ref 11.5–15)
PLATELET # BLD: 257 E9/L (ref 130–450)
PMV BLD AUTO: 9.9 FL (ref 7–12)
POTASSIUM REFLEX MAGNESIUM: 4.4 MMOL/L (ref 3.5–5)
RBC # BLD: 4.29 E12/L (ref 3.8–5.8)
SARS-COV-2, NAAT: NOT DETECTED
SODIUM BLD-SCNC: 138 MMOL/L (ref 132–146)
WBC # BLD: 7.8 E9/L (ref 4.5–11.5)

## 2022-07-11 PROCEDURE — 99284 EMERGENCY DEPT VISIT MOD MDM: CPT

## 2022-07-11 PROCEDURE — 80048 BASIC METABOLIC PNL TOTAL CA: CPT

## 2022-07-11 PROCEDURE — 87502 INFLUENZA DNA AMP PROBE: CPT

## 2022-07-11 PROCEDURE — 87635 SARS-COV-2 COVID-19 AMP PRB: CPT

## 2022-07-11 PROCEDURE — 85025 COMPLETE CBC W/AUTO DIFF WBC: CPT

## 2022-07-11 PROCEDURE — 36415 COLL VENOUS BLD VENIPUNCTURE: CPT

## 2022-07-11 PROCEDURE — 71046 X-RAY EXAM CHEST 2 VIEWS: CPT

## 2022-07-11 RX ORDER — BENZONATATE 100 MG/1
100 CAPSULE ORAL 3 TIMES DAILY PRN
Qty: 21 CAPSULE | Refills: 0 | Status: SHIPPED | OUTPATIENT
Start: 2022-07-11 | End: 2022-07-18

## 2022-07-11 RX ORDER — ALBUTEROL SULFATE 90 UG/1
2 AEROSOL, METERED RESPIRATORY (INHALATION) 4 TIMES DAILY PRN
Qty: 18 G | Refills: 0 | Status: SHIPPED | OUTPATIENT
Start: 2022-07-11 | End: 2022-10-06 | Stop reason: ALTCHOICE

## 2022-07-11 ASSESSMENT — ENCOUNTER SYMPTOMS
VOMITING: 0
EYE DISCHARGE: 0
ABDOMINAL PAIN: 0
NAUSEA: 0
SHORTNESS OF BREATH: 1
EYE PAIN: 0
DIARRHEA: 0
EYE REDNESS: 0
BACK PAIN: 0
COUGH: 1
WHEEZING: 0
SINUS PRESSURE: 0
SORE THROAT: 0

## 2022-07-11 NOTE — ED PROVIDER NOTES
Department of Emergency Medicine   ED  Provider Note  Admit Date/RoomTime: 7/11/2022 11:15 AM  ED Room: 12/12          History of Present Illness:  7/11/22, Time: 12:06 PM EDT  Chief Complaint   Patient presents with    Cough     x2-3 days    Generalized Body Aches    Chills            An Waldrop is a 67 y.o. male presenting to the ED for cough and myalgias, beginning 2 days ago. The complaint has been persistent, moderate in severity, and worsened by nothing. Patient states that he has had this previously when he had the flu. Patient states that the cough is productive of sputum. He has attempted to take some Tylenol with mild improvement. He also endorses some mild shortness of breath. He otherwise denies any fevers, chest pain, nausea, vomiting, or abdominal pain. Patient states he did not have his COVID nor flu vaccine. Review of Systems   Constitutional: Negative for chills and fever. HENT: Negative for ear pain, sinus pressure and sore throat. Eyes: Negative for pain, discharge and redness. Respiratory: Positive for cough and shortness of breath. Negative for wheezing. Cardiovascular: Negative for chest pain. Gastrointestinal: Negative for abdominal pain, diarrhea, nausea and vomiting. Genitourinary: Negative for dysuria and frequency. Musculoskeletal: Positive for myalgias. Negative for arthralgias and back pain. Skin: Negative for rash and wound. Neurological: Negative for weakness and headaches. Hematological: Negative for adenopathy. All other systems reviewed and are negative.         --------------------------------------------- PAST HISTORY ---------------------------------------------  Past Medical History:  has a past medical history of Diabetes ([address] and Hypertension. Past Surgical History:  has no past surgical history on file. Social History:  reports that he has never smoked.  He has never used smokeless tobacco. He reports that he does not drink alcohol and does not use drugs. Family History: family history is not on file. . Unless otherwise noted, family history is non contributory    The patients home medications have been reviewed. Allergies: Patient has no known allergies. ---------------------------------------------------PHYSICAL EXAM--------------------------------------    Physical Exam  Vitals and nursing note reviewed. Constitutional:       General: He is not in acute distress. Appearance: He is well-developed. HENT:      Head: Normocephalic and atraumatic. Eyes:      Conjunctiva/sclera: Conjunctivae normal.   Cardiovascular:      Rate and Rhythm: Normal rate and regular rhythm. Heart sounds: Normal heart sounds. No murmur heard. Pulmonary:      Effort: Pulmonary effort is normal. No respiratory distress. Breath sounds: Normal breath sounds. No wheezing or rales. Abdominal:      General: Bowel sounds are normal.      Palpations: Abdomen is soft. Tenderness: There is no abdominal tenderness. There is no guarding or rebound. Musculoskeletal:         General: No tenderness or deformity. Cervical back: Normal range of motion and neck supple. Skin:     General: Skin is warm and dry. Neurological:      Mental Status: He is alert and oriented to person, place, and time. Cranial Nerves: No cranial nerve deficit. Coordination: Coordination normal.            -------------------------------------------------- RESULTS -------------------------------------------------  I have personally reviewed all laboratory and imaging results for this patient. Results are listed below.      LABS: (Lab results interpreted by me)  Results for orders placed or performed during the hospital encounter of 07/11/22   COVID-19, Rapid    Specimen: Nasopharyngeal Swab   Result Value Ref Range    SARS-CoV-2, NAAT Not Detected Not Detected   RAPID INFLUENZA A/B ANTIGENS    Specimen: Nasopharyngeal   Result Value Ref Range    Influenza A by PCR Not Detected Not Detected    Influenza B by PCR Not Detected Not Detected   CBC with Auto Differential   Result Value Ref Range    WBC 7.8 4.5 - 11.5 E9/L    RBC 4.29 3.80 - 5.80 E12/L    Hemoglobin 13.2 12.5 - 16.5 g/dL    Hematocrit 39.1 37.0 - 54.0 %    MCV 91.1 80.0 - 99.9 fL    MCH 30.8 26.0 - 35.0 pg    MCHC 33.8 32.0 - 34.5 %    RDW 12.2 11.5 - 15.0 fL    Platelets 906 624 - 681 E9/L    MPV 9.9 7.0 - 12.0 fL    Neutrophils % 67.7 43.0 - 80.0 %    Immature Granulocytes % 0.5 0.0 - 5.0 %    Lymphocytes % 18.0 (L) 20.0 - 42.0 %    Monocytes % 10.9 2.0 - 12.0 %    Eosinophils % 2.0 0.0 - 6.0 %    Basophils % 0.9 0.0 - 2.0 %    Neutrophils Absolute 5.29 1.80 - 7.30 E9/L    Immature Granulocytes # 0.04 E9/L    Lymphocytes Absolute 1.41 (L) 1.50 - 4.00 E9/L    Monocytes Absolute 0.85 0.10 - 0.95 E9/L    Eosinophils Absolute 0.16 0.05 - 0.50 E9/L    Basophils Absolute 0.07 0.00 - 0.20 E6/T   Basic Metabolic Panel w/ Reflex to MG   Result Value Ref Range    Sodium 138 132 - 146 mmol/L    Potassium reflex Magnesium 4.4 3.5 - 5.0 mmol/L    Chloride 101 98 - 107 mmol/L    CO2 24 22 - 29 mmol/L    Anion Gap 13 7 - 16 mmol/L    Glucose 126 (H) 74 - 99 mg/dL    BUN 14 6 - 23 mg/dL    CREATININE 0.7 0.7 - 1.2 mg/dL    GFR Non-African American >60 >=60 mL/min/1.73    GFR African American >60     Calcium 9.3 8.6 - 10.2 mg/dL   ,       RADIOLOGY:  Interpreted by Radiologist unless otherwise specified  XR CHEST (2 VW)   Final Result   No acute process.                           ------------------------- NURSING NOTES AND VITALS REVIEWED ---------------------------   The nursing notes within the ED encounter and vital signs as below have been reviewed by myself  BP (!) 174/83   Pulse 76   Temp 98.8 °F (37.1 °C) (Oral)   Resp 14   Ht 5' 8\" (1.727 m)   Wt 190 lb (86.2 kg)   SpO2 97%   BMI 28.89 kg/m²     Oxygen Saturation Interpretation: Normal    The patients available past medical records and past encounters were reviewed. ------------------------------ ED COURSE/MEDICAL DECISION MAKING----------------------  Medications - No data to display         Medical Decision Making:     ED Course as of 07/11/22 [address] Jul 11, 2022   1634 Patient presents with upper respiratory symptoms. Vitals are stable. There was a documented O2 sat of 14% however this was incorrectly documented. Lab work was unremarkable. COVID and influenza were negative. Chest x-ray did not show any acute cardiopulmonary processes. Patient be discharged with a prescription for [facility] and advised to follow-up with his PCP for further evaluation care. [BB]      ED Course User Index  [BB] Gordon Yusuf DO        Re-Evaluations:  Patient was reevaluted and continued to be stable. This patient's ED course included: a personal history and physicial examination and multiple bedside re-evaluations    This patient has remained hemodynamically stable during their ED course. Consultations:  None      Critical Care: None       Counseling: The emergency provider has spoken with the patient and discussed todays results, in addition to providing specific details for the plan of care and counseling regarding the diagnosis and prognosis. Questions are answered at this time and they are agreeable with the plan.       --------------------------------- IMPRESSION AND DISPOSITION ---------------------------------    IMPRESSION  1. Acute upper respiratory infection        DISPOSITION  Disposition: Discharge to home  Patient condition is stable    Patient was seen and evaluated by both myself and Sanchez Gar MD.      NOTE: This report was transcribed using voice recognition software.  Every effort was made to ensure accuracy; however, inadvertent computerized transcription errors may be present           Gordon Yusuf DO  Resident  07/11/22 9605

## 2022-07-11 NOTE — ED NOTES
Department of Emergency Medicine  FIRST PROVIDER TRIAGE NOTE             Independent MLP           7/11/22  9:38 AM EDT    Date of Encounter: 7/11/22   MRN: [id number]      HPI: Talha Garay is a 67 y.o. male who presents to the ED for No chief complaint on file. Patient reports that for the past 3 days he has had a worsening productive cough of spit and yellow-tinged mucus. He states that he does not have additional symptoms at this time however he feels he may have the flu. Patient states that he takes a daily aspirin as a blood thinner only. ROS: Negative for cp, sob, abd pain, back pain, fever, vomiting, diarrhea, urinary complaints, rash, headache or dizziness. PE: Gen Appearance/Constitutional: alert  CV: regular rate  Pulm: CTA bilat  GI: soft and NT     Initial Plan of Care: All treatment areas with department are currently occupied. Plan to order/Initiate the following while awaiting opening in ED: labs, imaging studies and analgesics.   Initiate Treatment-Testing, Proceed toTreatment Area When Bed Available for ED Attending/MLP to Continue Care    Electronically signed by KARINE Chaparro   DD: 7/11/22         Julia Chaparro  07/11/22 [id number]

## 2022-07-11 NOTE — PROGRESS NOTES
Patient called to cancel therapy for today due to the flu. Therapy is expected to resume on the patient's next scheduled visit.

## 2022-07-13 ENCOUNTER — APPOINTMENT (OUTPATIENT)
Dept: SPEECH THERAPY | Age: 72
End: 2022-07-13
Payer: MEDICARE

## 2022-07-18 ENCOUNTER — HOSPITAL ENCOUNTER (OUTPATIENT)
Dept: SPEECH THERAPY | Age: 72
Setting detail: THERAPIES SERIES
Discharge: HOME | End: 2022-07-18
Payer: MEDICARE

## 2022-07-18 PROCEDURE — 97130 THER IVNTJ EA ADDL 15 MIN: CPT

## 2022-07-18 PROCEDURE — 97129 THER IVNTJ 1ST 15 MIN: CPT

## 2022-07-18 NOTE — PROGRESS NOTES
30 minute cognitive session. Conducted by student SLP and supervised by managing SLP. Virtual  utilized. Pt's son was present during the session. Temperature was taken prior to the session. Temperature was [facility]. COVID-19 screening questionnaire was negative. Mask was worn by SLP. Patient wore a mask. Therapy targeted the following:    Pt practiced the memory technique of verbal rehearsal to engage both visual and auditory memory. Pt utilized verbal rehearsal by recalling information from picture scenes. - Pt was provided with a 3 pictures to view and verbally stated details of the picture. The picture was then removed and the pt was asked questions from each picture. He recalled details to answer the questions with 79% accuracy. Continue POC. Zahira Kramer, SLP  Clinician    Luisa Rosa M.S., CCC-SLP/L  Speech-Language Pathologist    CPT CODE:       21310  therapeutic interventions that focus on cognitive function , initial  15 min  44990  therapeutic interventions that focus on cognitive function, each additional 15 min

## 2022-07-20 ENCOUNTER — APPOINTMENT (OUTPATIENT)
Dept: SPEECH THERAPY | Age: 72
End: 2022-07-20
Payer: MEDICARE

## 2022-07-25 ENCOUNTER — OFFICE VISIT (OUTPATIENT)
Dept: ENT CLINIC | Age: 72
End: 2022-07-25
Payer: MEDICARE

## 2022-07-25 VITALS — BODY MASS INDEX: 28.79 KG/M2 | WEIGHT: 190 LBS | HEIGHT: 68 IN

## 2022-07-25 DIAGNOSIS — G47.33 OSA ON CPAP: Primary | ICD-10-CM

## 2022-07-25 DIAGNOSIS — Z99.89 OSA ON CPAP: Primary | ICD-10-CM

## 2022-07-25 PROCEDURE — 99203 OFFICE O/P NEW LOW 30 MIN: CPT | Performed by: OTOLARYNGOLOGY

## 2022-07-25 PROCEDURE — 1123F ACP DISCUSS/DSCN MKR DOCD: CPT | Performed by: OTOLARYNGOLOGY

## 2022-07-25 NOTE — PROGRESS NOTES
[id number] [facility] Otolaryngology  Dr. Lucie Santos. KASSIE Pond Ms.Ed. New Consult       Patient Name:  Fifi Galeas  :  1950     CHIEF C/O:    Chief Complaint   Patient presents with    New Patient     [facility],        HISTORY OBTAINED FROM:  patient    HISTORY OF PRESENT ILLNESS:       Rk Cevallos is a 67y.o. year old male, here today for here for evaluation of possible hypoglossal nerve stimulator with a known history of DINO and failure of CPAP therapy. Patient was sent by sleep medicine physician for BMI of 22 and an AHI index of 49. Patient states he had significant difficulty with CPAP mask, as well as continued type symptoms with persistent fatigue. Patient has significant daytime somnolence as well. No current complaints of new nasal congestion headaches vision change no prior oropharyngeal surgeries, no history of tinnitus vertigo hearing loss is changing. No chronic sinusitis. Past Medical History:   Diagnosis Date    Diabetes ([address]     Hypertension      History reviewed. No pertinent surgical history.     Current Outpatient Medications:     albuterol sulfate HFA (VENTOLIN HFA) 108 (90 Base) MCG/ACT inhaler, Inhale 2 puffs into the lungs 4 times daily as needed for Wheezing, Disp: 18 g, Rfl: 0    guaiFENesin (ROBITUSSIN) 100 MG/5ML syrup, Take 200 mg by mouth 3 times daily as needed for Cough, Disp: , Rfl:     diclofenac sodium (VOLTAREN) 1 % GEL, Apply 2 g topically 2 times daily, Disp: 100 g, Rfl: 3    losartan (COZAAR) 100 MG tablet, Take 1 tablet by mouth daily, Disp: 30 tablet, Rfl: 11    naproxen (NAPROSYN) 500 MG tablet, Take 1 tablet by mouth 2 times daily as needed for Pain, Disp: 28 tablet, Rfl: 0    Multiple Vitamins-Minerals (THERAPEUTIC MULTIVITAMIN-MINERALS) tablet, Take 1 tablet by mouth daily, Disp: , Rfl:     aspirin 81 MG EC tablet, Take 81 mg by mouth daily, Disp: , Rfl:     acetaminophen (APAP EXTRA STRENGTH) 500 MG tablet, Take 1 tablet by mouth every 6 hours as needed for Pain, Disp: 120 tablet, Rfl: 3    metFORMIN (GLUCOPHAGE) 850 MG tablet, Take 850 mg by mouth 3 times daily, Disp: , Rfl:     ibuprofen (ADVIL;MOTRIN) 800 MG tablet, Take 1 tablet by mouth every 6 hours as needed for Pain With food to prevent stomach upset, Disp: 20 tablet, Rfl: 0  Patient has no known allergies. Social History     Tobacco Use    Smoking status: Never    Smokeless tobacco: Never   Substance Use Topics    Alcohol use: No    Drug use: No     History reviewed. No pertinent family history. Review of Systems   Constitutional:  Negative for activity change, chills and fever. HENT:  Negative for congestion, dental problem, ear discharge, hearing loss and sinus pain. Respiratory:  Negative for cough and shortness of breath. Cardiovascular:  Negative for chest pain and palpitations. Gastrointestinal:  Negative for vomiting. Skin:  Negative for rash. Allergic/Immunologic: Negative for environmental allergies. Neurological:  Negative for dizziness and headaches. Hematological:  Does not bruise/bleed easily. All other systems reviewed and are negative. Ht 5' 8\" (1.727 m)   Wt 190 lb (86.2 kg)   BMI 28.89 kg/m²   Physical Exam  Vitals and nursing note reviewed. Constitutional:       Appearance: He is well-developed. HENT:      Head: Normocephalic and atraumatic. No masses. Right Ear: Tympanic membrane and ear canal normal.      Left Ear: Tympanic membrane and ear canal normal.      Nose: No congestion or rhinorrhea. Mouth/Throat:      Pharynx: Oropharynx is clear. No posterior oropharyngeal erythema. Eyes:      Pupils: Pupils are equal, round, and reactive to light. Neck:      Thyroid: No thyromegaly. Trachea: No tracheal deviation. Cardiovascular:      Rate and Rhythm: Normal rate. Pulmonary:      Effort: Pulmonary effort is normal. No respiratory distress. Musculoskeletal:         General: Normal range of motion. Cervical back: Normal range of motion. Lymphadenopathy:      Cervical: No cervical adenopathy. Skin:     General: Skin is warm. Findings: No erythema. Neurological:      Mental Status: He is alert. Cranial Nerves: No cranial nerve deficit. IMPRESSION/PLN:    Is seen and examined for history of hypoglossal nerve stimulator implantation evaluation with a known history of an AHI index of  , And a BMI of 22. Recommendations are for the patient to continue current medical therapies, follow-up with a drug-induced sleep endoscopy to ensure adequate anterior posterior versus concentric collapse and his candidacy for hypoglossal nerve stimulation. Risk and benefits of 8 drug-induced endoscopically were reviewed, and a brief introduction into the process of actual hypoglossal nerve stimulator implantation was reviewed. Publications were provided both in [place] and in [place] Dr. Hany Poon Otolaryngology/Facial Plastic Surgery Residency  Associate Clinical Professor:  Manuel Diggs, [facility]

## 2022-07-25 NOTE — PATIENT INSTRUCTIONS
SURGERY:_____/_____/_____    Nothing to eat or drink after midnight the night before surgery unless surgery is at [facility] or otherwise instructed by the hospital.    DO NOT TAKE ANY ASPIRIN PRODUCTS 7 days prior to surgery. Tylenol only. No Advil, Motrin, Aleve, or Ibuprofen. IF YOU ARE ON BLOOD THINNERS (PLAVIX, COUMADIN, ELIQUIS ETC) THESE WILL ALSO NEED TO BE HELD. Any illegal drugs in your system (including Marijuana even if legally prescribed) will result in your surgery being cancelled. Please be sure to check with our office or the hospital on time frame for the drugs to be out of your system. SHOULD YOUR INSURANCE CHANGE AT ANY TIME YOU MUST CONTACT OUR OFFICE. FAILURE TO DO SO MAY RESULT IN YOUR SURGERY BEING RESCHEDULED OR YOU MAY BE CHARGED AS SELF-PAY. Due to the high demand for surgery at our practice, if you cancel or reschedule your surgery two (2) times we may not reschedule you. If you need FMLA or Short Term Disability paperwork completed for your surgery, please complete your portion, ensure your name and date of birth are on them and fax them to [phone] asap. Paperwork can take up to 2 weeks to be completed. Per current [facility] protocols, COVID Testing is NOT required prior to procedure UNLESS you are symptomatic. (Vaccinated or Unvaccinated)- [facility] requires COVID Testing 72 hours prior to surgery. If you need medical clearance, you are responsible to contact your physician(s) to schedule an appointment for clearance. If clearance is not completed within 30 days of your surgery it may be cancelled. Our office will fax the appropriate forms that need to be completed to your physician(s).     The location of your surgery will call you the day prior to your surgery date to let you know what time you have to be there and any other details. (they usually don't call until late afternoon- early evening.)- IF YOU HAVE QUESTIONS REGARDING THE TIME OF YOUR SURGERY, PLEASE CALL THE FACILITY YOU ARE SCHEDULED AT. [address] [facility], [address] [facility] will call you a couple days prior to surgery and give you further instructions, if you have any questions, you can reach them at [phone] (per Pre-Admission testing, EKG is required for all patients age 53+, have a diagnosis of hypertension, diabetes, or on dialysis). [address] [facility] will call you a couple days prior to surgery and give you further instructions, if you have any questions, you can reach them at [phone] (per Pre-Admission testing, EKG is required for all patients age 53+, have a diagnosis of hypertension, diabetes, or on dialysis). [address] [facility] [facility] will call you a couple days prior to surgery and give you further instructions, if you have any questions, you can reach them at [phone] (per Pre-Admission testing, EKG is required for all patients age 53+, have a diagnosis of hypertension, diabetes, or on dialysis). [facility]), [address],  [place], [address] [facility] will call you a couple days prior to surgery and give you further instructions, if you have any questions, you can reach them at [phone]      Pre-Surgery/Anesthesia Video ([facility] ONLY)  Located on [address]:  1. Scroll over Health Information  2. Select Audio and Video  3. Select "[employer]  4. Select Your child and Anesthesia  5.  Select Pre surgery [facility]    FOOD RESTRICTIONS--[facility] ONLY  Solid Food/Milk Products --------- Stop 8 hours prior to Surgery  Formula --------- Stop 6 hours prior to Surgery  Breast Milk ------- Stop 4 hours prior to Surgery  Clear liquids (water, Gatorade, Pedialyte) - Stop 2 hours prior to Surgery

## 2022-08-01 ENCOUNTER — HOSPITAL ENCOUNTER (OUTPATIENT)
Dept: SPEECH THERAPY | Age: 72
Setting detail: THERAPIES SERIES
Discharge: HOME | End: 2022-08-01
Payer: MEDICARE

## 2022-08-01 PROCEDURE — 97129 THER IVNTJ 1ST 15 MIN: CPT

## 2022-08-01 PROCEDURE — 97130 THER IVNTJ EA ADDL 15 MIN: CPT

## 2022-08-01 NOTE — PROGRESS NOTES
Patient was seen for cognitive therapy. Temperature was taken prior to the session. Temperature was [facility] [place]. COVID-19 screening questionnaire was negative. Mask was worn by SLP. Patient and his son wore a mask.  through [facility] [employer], [place] #[id number], assisted in translation for the session. The following was targeted:    Patient was presented with 6 unrelated objects. Patient was asked to group or pair the objects using association. Patient verbalized how the items were associated. The objects were then removed and the patient recalled the objects with 89% accuracy. Continue plan of care. Treatment plan and goals can be found in the initial evaluation/progress report. Mercedes Storey M.S., CCC-SLP/L  Speech-Language Pathologist    CPT CODE:       71507  therapeutic interventions that focus on cognitive function , initial  15 min  73448  therapeutic interventions that focus on cognitive function, each additional 15 min

## 2022-08-03 ENCOUNTER — APPOINTMENT (OUTPATIENT)
Dept: SPEECH THERAPY | Age: 72
End: 2022-08-03
Payer: MEDICARE

## 2022-08-03 ENCOUNTER — TELEPHONE (OUTPATIENT)
Dept: ENT CLINIC | Age: 72
End: 2022-08-03

## 2022-08-03 NOTE — TELEPHONE ENCOUNTER
Called and scheduled sx with pt's daughter Leila Cohen   for 10/11/22 @ [facility]. Restrictions and information has been reviewed with patient's daughter who expressed understanding and all questions answered.

## 2022-08-04 ENCOUNTER — TELEPHONE (OUTPATIENT)
Dept: ENT CLINIC | Age: 72
End: 2022-08-04

## 2022-08-08 ENCOUNTER — OFFICE VISIT (OUTPATIENT)
Dept: NEUROLOGY | Age: 72
End: 2022-08-08
Payer: MEDICARE

## 2022-08-08 ENCOUNTER — HOSPITAL ENCOUNTER (OUTPATIENT)
Dept: SPEECH THERAPY | Age: 72
Setting detail: THERAPIES SERIES
Discharge: HOME | End: 2022-08-08
Payer: MEDICARE

## 2022-08-08 VITALS
DIASTOLIC BLOOD PRESSURE: 76 MMHG | HEART RATE: 65 BPM | OXYGEN SATURATION: 97 % | SYSTOLIC BLOOD PRESSURE: 178 MMHG | TEMPERATURE: 97.6 F

## 2022-08-08 DIAGNOSIS — R41.3 SHORT-TERM MEMORY LOSS: Primary | ICD-10-CM

## 2022-08-08 DIAGNOSIS — G47.33 SEVERE OBSTRUCTIVE SLEEP APNEA: ICD-10-CM

## 2022-08-08 DIAGNOSIS — R41.841 COGNITIVE COMMUNICATION DISORDER: ICD-10-CM

## 2022-08-08 PROCEDURE — G8417 CALC BMI ABV UP PARAM F/U: HCPCS | Performed by: NURSE PRACTITIONER

## 2022-08-08 PROCEDURE — 97129 THER IVNTJ 1ST 15 MIN: CPT

## 2022-08-08 PROCEDURE — 99214 OFFICE O/P EST MOD 30 MIN: CPT | Performed by: NURSE PRACTITIONER

## 2022-08-08 PROCEDURE — 97130 THER IVNTJ EA ADDL 15 MIN: CPT

## 2022-08-08 PROCEDURE — G8427 DOCREV CUR MEDS BY ELIG CLIN: HCPCS | Performed by: NURSE PRACTITIONER

## 2022-08-08 PROCEDURE — 3017F COLORECTAL CA SCREEN DOC REV: CPT | Performed by: NURSE PRACTITIONER

## 2022-08-08 PROCEDURE — 1123F ACP DISCUSS/DSCN MKR DOCD: CPT | Performed by: NURSE PRACTITIONER

## 2022-08-08 PROCEDURE — 1036F TOBACCO NON-USER: CPT | Performed by: NURSE PRACTITIONER

## 2022-08-08 NOTE — PROGRESS NOTES
Patient was seen for cognitive therapy. Temperature was taken prior to the session. Temperature was [facility] [place]. COVID-19 screening questionnaire was negative. Mask was worn by SLP. Patient and his son wore a mask.  through [facility] [employer] system, Nimo Cortes, assisted in translation for the session. The following was targeted:    Patient was presented with 6 objects. Patient was asked to group the objects into categories. Patient verbalized how the items were related in the categories. He demonstrated 100% accuracy. The language/translation was the biggest barrier for this activity by increasing response times and frequent clarifications. Continue plan of care. Treatment plan and goals can be found in the initial evaluation/progress report. Mercedes Storey M.S., CCC-SLP/L  Speech-Language Pathologist    CPT CODE:       92210  therapeutic interventions that focus on cognitive function , initial  15 min  84134  therapeutic interventions that focus on cognitive function, each additional 15 min

## 2022-08-08 NOTE — PROGRESS NOTES
[address] [facility]. Venessa Jain M.D., F.A.C.P. [place], DNP, APRN, ACNS-BC  Harjinder Holder. Joe Saez, MACO, APRN-FNP-C  Linda Pacheco, MSN, APRN-FNP-C  MCKENZIE Morales PA-C Ward Other, MSN, APRN-FNP-C  [address] [place] [address]  [place], [address]  Phone: [phone]  Fax: [phone]       Roselyn Callahan is a 67 y.o. right handed male     Patient follows for short term memory loss and history of stroke, Covid     Patient is Syriac speaking     Onset was several years ago as he was seeing a Neurologist in [facility].  He has noticed difficulty getting his words out and remembering things that were just told to him. He has no issues with ADL's. He is able to dress himself and care for himself. He has gotten lost twice while driving. He has forgotten his bank PIN number a few times which worries him. His wife has noticed some memory loss as well. He has not been started on any new medications. He was recently seen by Sleep medicine and has been diagnosed with DINO. His report indicates that he does meet criteria for diagnosis of severe DINO syndrome with apnea/hypoapnea index. He has a follow up appt with Sleep on 5/29/2021 and would benefit from CPAP titration and would avoid sedation on this patient. He was started on Aricept by his PCP but patient could not tolerate and it was discontinued due to GI upset. Today patient notes he doing well and a little better with speech therapy. He notes he is having an upper airway stimulation implant. His pulmonologist thinks he is a candidate and will be getting checked in the next couple weeks to help with his sleep apnea.     No issues with chewing or swallowing  No chest pain or palpitations  No SOB  No vertigo, lightheadedness or loss of consciousness  No falls, tripping or stumbling  No incontinence of bowels or bladder  No itching or bruising appreciated  No numbness, tingling or focal arm/leg weakness    ROS is otherwise negative    Objective:     Vitals:    08/08/22 1007   BP: (!) 178/76   Site: Right Upper Arm   Pulse: 65   Temp: 97.6 °F (36.4 °C)   SpO2: 97%     General appearance: alert, appears stated age, cooperative and in no distress  Head: normocephalic, without obvious abnormality, atraumatic  Eyes: conjunctivae/corneas clear; no drainage  Neck: symmetrical, trachea midline   Lungs: clear to auscultation bilaterally  Heart: regular rate and rhythm, S1, S2 normal  Abdomen: soft, non-tender; bowel sounds normal  Extremities: normal, atraumatic, no cyanosis or edema  Skin:  color, texture, turgor normal--no rashes or lesions      Mental Status: alert and oriented x 4    Appropriate attention/concentration  Intact fundus of knowledge      Speech: no dysarthria  Language: no aphasias---reading, writing, repetition, and object identification intact    Cranial Nerves:  I: smell    II: visual acuity     II: visual fields Full    II: pupils MU   III,VII: ptosis None   III,IV,VI: extraocular muscles  EOMI without nystagmus   V: mastication Normal   V: facial light touch sensation  Normal   V,VII: corneal reflex     VII: facial muscle function - upper  Normal   VII: facial muscle function - lower Normal   VIII: hearing Normal   IX: soft palate elevation  Normal   IX,X: gag reflex    XI: trapezius strength  5/5   XI: sternocleidomastoid strength 5/5   XI: neck extension strength  5/5   XII: tongue strength  Normal     Motor:  5/5 throughout  Normal bulk and tone  No drift   No abnormal movements    Sensory:  LT normal    Coordination:   FN, FFM and ADDY normal    Gait:  Normal    DTR:   2+ throughout     Laboratory/Radiology:  ry/Radiology:     No labs to review at this time    Assessment:     Short term memory loss likely due to severe DINO  --- CTH negative for acute findings  --- neuro exam non focal  --- we will obtain SLP cog evaluation for baseline  --- hx of stroke  --- has tried Aricept but had SE of GI upset   --- Upper airway stimulation implant candidate    Plan:     Referral to SLP for cognitive evaluation     Follow with Sleep med    Follow up in 6 months    Call with any questions or concerns      LEAH Hoover CNP  10:20 AM  8/8/2022

## 2022-08-08 NOTE — PROGRESS NOTES
[address] [place] [address]  Outpatient Speech Therapy  Phone: [phone]            Fax: [phone] OF CARE    PATIENT NAME:  Nava Connell  (male)                MRN:  [id number]                       :  1950  (66 y.o.)  STATUS:  Outpatient clinic       TODAY'S DATE:  2022  REFERRING PROVIDER:    LEAH Saldivar-CNP        PROVIDER NPI:  [id number]  SPECIFIC PROVIDER ORDER: [facility]  Date of order:  22  EVALUATING THERAPIST: [facility]     CERTIFICATION/RECERTIFICATION PERIOD: 22 to 10/31/22  INSURANCE PROVIDER:  Payor: Kuldip Lawrence / Plan: Rocio Oneill HMO / Product Type: *No Product type* /      INSURANCE ID:  [id number] - (Medicare Managed)              SECONDARY INSURANCE (if applicable): MEDICAID [place]     CPT Codes  EVALUATION:            63873  standardized cognitive performance testing (per hour)     TREATMENT:             Requesting treatment authorization for  14 visits over 14 weeks focusing on the following CPT codes:                                                  18689  therapeutic interventions that focus on cognitive function , initial  15 min  87899  therapeutic interventions that focus on cognitive function, each additional 15 min     Patient has completed 7:14 recommended visits. An additional 12 visits is recommended for this quarter for a total of 19 visits. REFERRING/TREATMENT DIAGNOSIS: Cognitive communication deficit [R41.841]           SPEECH THERAPY  PLAN OF CARE   The speech therapy  POC is established based on physician order, speech pathology diagnosis and results of clinical assessment     SPEECH PATHOLOGY DIAGNOSIS:      Admission:  Mild cognitive deficit    Current:  Mild cognitive deficit     Outpatient Speech Pathology intervention is recommended 1 time per week for the above certification period. Conditions Requiring Skilled Therapeutic Intervention for speech, language and/or cognition  Cognitive linguistic impairment  Decreased safety awareness  Decreased short term memory  Decreased problem solving skills  Decreased thought organization     Specific Speech Therapy Interventions to Include: Therapeutic tasks for Cognition        SHORT/LONG TERM GOALS:  Progress in therapy has been recorded using the following key:  NC= no change; IMP= improved; ACH= achieved; NEI= not enough information      1. Patient will complete the RIPA-G-ACH. 2.  Patient will improve immediate memory for functional tasks such as recording phone messages to a supervisory assistance level with greater than 90% accuracy and the use of compensatory aids. -IMP  3. Patient will improve recent memory including temporal/spatial orientation to a supervisory assistance level with greater than 90% accuracy with the use of a memory log/calendar aid.-IMP  4. Patient will complete the Assessment for Language Related Functional Activities-NC  5. Patient will identify and spontaneously use compensatory techniques to assist with memory with greater than 90% accuracy-IMP  6. Patient will improve problem solving for functional activities such as financial, medication, and schedule management to a supervisory assistance level with greater than 90% accuracy and the use of compensatory aids/strategies. -NC        UPDATED PLAN OF CARE:  1. Patient will improve immediate memory for functional tasks such as recording phone messages to a supervisory assistance level with greater than 90% accuracy and the use of compensatory aids. 2.  Patient will improve recent memory including temporal/spatial orientation to a supervisory assistance level with greater than 90% accuracy with the use of a memory log/calendar aid. 3.  Patient will complete the Assessment for Language Related Functional Activities  4.   Patient will identify and spontaneously use compensatory techniques to assist with memory with greater than 90% accuracy  5. Patient will improve problem solving for functional activities such as financial, medication, and schedule management to a supervisory assistance level with greater than 90% accuracy and the use of compensatory aids/strategies. Rehabilitation Potential/Prognosis: good                                                                                                                                                                                                                                                     Cognitive Evaluation:  Stimulus questions were obtained from the RIPA-G. At the time of initial evaluation on 5/16/22, a Uzbek tele- was used for the assessment.        Ross Information Processing Assessment-Geriatric:    Subtest Raw Score Percentile Standard Score Severity          Immediate Memory 23 25 8 Severe-  moderate   Recent Memory 29 95 15 [facility]   Temporal Orientation 30 95 15 [facility]   Spatial Orientation 26 63 11 moderate   Orientation to Environment 27 75 12 Moderate-  mild   Recall of General Information 27 75 12 Moderate-  mild   Problem Solving & Abstract Reasoning 30 95 15 [facility]   Organization of Information 27 91 14 [facility]   Auditory Processing & Comprehension 30 95 15 [facility]   Problem Solving and [place] Reasoning 29 91 14 [facility]   Naming Common Objects 30 91 14 [facility]   Functional Oral Reading 30 91 14 [facility]     Composites Sum of Standard Scores Quotients Percentile Rank Severity Rating   Information Processing 131 122 93 [facility]   Memory 35 111 77 mild   Orientation 38 117 87 mild   Problem Solving 43 128 97 [facility]       VARIANT OBSERVATIONS:     Present Absent   Error (e) x    Perseveration (p)  x   Repeat Instructions/Stimulus (r) x    Denial/Refusal (d) x    Delayed Response (dl) x    Confabulation (c)  x   Partially Correct (pc) x    Irrelevant (i)  x   Tangential (t)  x   Self-corrected (sc) x SUMMARY:  Winsome Carreon has demonstrated improvement in response to speech therapy intervention. Continued therapy is recommended in order to address the above mentioned needs. Prognosis for continued improvement is good. Mercedes Williamson M.S., TRICIA-SLP/L  Speech-Language Pathologist        [address]     Phone: [phone]     If you have any questions or concerns, please don't hesitate to call. Thank you for your referral.     Physician/Provider Signature:________________________________Date:__________________  By signing above, the therapists plan is approved by the physician/provider. All patients under [employer] must be signed by physician/provider.

## 2022-08-15 ENCOUNTER — HOSPITAL ENCOUNTER (OUTPATIENT)
Dept: SPEECH THERAPY | Age: 72
Setting detail: THERAPIES SERIES
Discharge: HOME | End: 2022-08-15
Payer: MEDICARE

## 2022-08-15 PROCEDURE — 97129 THER IVNTJ 1ST 15 MIN: CPT

## 2022-08-15 PROCEDURE — 97130 THER IVNTJ EA ADDL 15 MIN: CPT

## 2022-08-15 NOTE — PROGRESS NOTES
Patient was seen for cognitive therapy. Temperature was taken prior to the session. Temperature was [facility] [place]. COVID-19 screening questionnaire was negative. Mask was worn by SLP. Patient and his son wore a mask.  through hospital [employer] system assisted in translation for the session. The following was targeted:    Patient was presented with 6 objects. Patient was asked to group the objects into categories. Patient verbalized how the items were related in the categories. He demonstrated 100% accuracy. The language/translation was the biggest barrier for this activity by increasing response times and frequent clarifications. The categories were then used as memory pegs to assist in recall. Patient was able to recall 11:18 items placed in categories using only the memory peg as a cue. He demonstrated 61% accuracy. Lastly, the patient was read a short paragraph. He recalled the main points from each paragraph with 80% accuracy. Continue plan of care. Treatment plan and goals can be found in the initial evaluation/progress report. Mercedes Jensen M.S., CCC-SLP/L  Speech-Language Pathologist    CPT CODE:       47557  therapeutic interventions that focus on cognitive function , initial  15 min  15994  therapeutic interventions that focus on cognitive function, each additional 15 min

## 2022-08-21 ASSESSMENT — ENCOUNTER SYMPTOMS
VOMITING: 0
SINUS PAIN: 0
SHORTNESS OF BREATH: 0
COUGH: 0

## 2022-08-22 ENCOUNTER — HOSPITAL ENCOUNTER (OUTPATIENT)
Dept: SPEECH THERAPY | Age: 72
Setting detail: THERAPIES SERIES
Discharge: HOME | End: 2022-08-22
Payer: MEDICARE

## 2022-08-22 PROCEDURE — 97129 THER IVNTJ 1ST 15 MIN: CPT

## 2022-08-22 PROCEDURE — 97130 THER IVNTJ EA ADDL 15 MIN: CPT

## 2022-08-22 PROCEDURE — 92507 TX SP LANG VOICE COMM INDIV: CPT

## 2022-08-22 NOTE — PROGRESS NOTES
Patient was seen for cognitive therapy. Temperature was taken prior to the session. Temperature was [facility]. Mask was worn by SLP. Patient and his son wore a mask.  through hospital [employer] system assisted in translation for the session. The following was targeted: The patient used visual imaging to code information and then answer questions about the information presented aloud. He did this with 53% accuracy. Continue plan of care. Treatment plan and goals can be found in the initial evaluation/progress report. Mercedes Alva M.S., CCC-SLP/L  Speech-Language Pathologist    CPT CODE:       90679  therapeutic interventions that focus on cognitive function , initial  15 min  15868  therapeutic interventions that focus on cognitive function, each additional 15 min

## 2022-08-29 ENCOUNTER — HOSPITAL ENCOUNTER (OUTPATIENT)
Dept: SPEECH THERAPY | Age: 72
Setting detail: THERAPIES SERIES
Discharge: HOME | End: 2022-08-29
Payer: MEDICARE

## 2022-08-29 NOTE — PROGRESS NOTES
Patient cancelled his appointment for today due to a scheduled vacation. Therapy is expected to resume on the patient's next scheduled visit.

## 2022-09-12 ENCOUNTER — HOSPITAL ENCOUNTER (OUTPATIENT)
Dept: SPEECH THERAPY | Age: 72
Setting detail: THERAPIES SERIES
Discharge: HOME | End: 2022-09-12
Payer: MEDICARE

## 2022-09-12 NOTE — PROGRESS NOTES
Valeriy Dent did not show for his scheduled speech appointment. No notification was received. Therapy is expected to resume on the patient's next scheduled visit.

## 2022-09-19 ENCOUNTER — HOSPITAL ENCOUNTER (OUTPATIENT)
Dept: SPEECH THERAPY | Age: 72
Setting detail: THERAPIES SERIES
Discharge: HOME | End: 2022-09-19
Payer: MEDICARE

## 2022-09-19 PROCEDURE — 97130 THER IVNTJ EA ADDL 15 MIN: CPT

## 2022-09-19 PROCEDURE — 97129 THER IVNTJ 1ST 15 MIN: CPT

## 2022-09-19 NOTE — PROGRESS NOTES
John Corbin did not show for his scheduled speech appointment at 8:30. He arrived for his appointment at 11:15. SLP was able to accommodate the patient and see him at 11:30. Computer  Jesenia Basurto #[id number] was used. The following was targeted:    Temporal orientation and calendar use was targeted. A Upper sorbian calendar was provided. Patient answered questions related to time and utilized the calendar to answer the questions with 88% accuracy. Continue plan of care. Treatment plan and goals can be found in the initial evaluation/progress report. Mercedes Butts M.S., CCC-SLP/L  Speech-Language Pathologist    CPT CODE:       52501  therapeutic interventions that focus on cognitive function , initial  15 min  07334  therapeutic interventions that focus on cognitive function, each additional 15 min

## 2022-09-26 ENCOUNTER — HOSPITAL ENCOUNTER (OUTPATIENT)
Dept: SPEECH THERAPY | Age: 72
Setting detail: THERAPIES SERIES
Discharge: HOME | End: 2022-09-26
Payer: MEDICARE

## 2022-09-26 NOTE — PROGRESS NOTES
Jc Vasquez did not show for his scheduled speech appointment. No notification was received. Therapy is expected to resume on the patient's next scheduled visit.

## 2022-10-03 ENCOUNTER — HOSPITAL ENCOUNTER (OUTPATIENT)
Dept: SPEECH THERAPY | Age: 72
Setting detail: THERAPIES SERIES
Discharge: HOME | End: 2022-10-03
Payer: MEDICARE

## 2022-10-03 PROCEDURE — 97130 THER IVNTJ EA ADDL 15 MIN: CPT

## 2022-10-03 PROCEDURE — 97129 THER IVNTJ 1ST 15 MIN: CPT

## 2022-10-03 NOTE — PROGRESS NOTES
Mirian Mejia was seen for cognitive therapy. The following was targeted:    Patient identified and recalled specific word types in messages presented aloud in English with 100% accuracy. Patient identified and recalled key elements in messages presented aloud in English with 70% accuracy. , Kimmie #[id number], then translated the same targets in [address] Patient identified and recalled key elements in messages presented aloud in Persian with 90% accuracy. Continue plan of care. Treatment plan and goals can be found in the initial evaluation/progress report. Mercedes Marroquin M.S., CCC-SLP/L  Speech-Language Pathologist    CPT CODE:       27006  therapeutic interventions that focus on cognitive function , initial  15 min  07507  therapeutic interventions that focus on cognitive function, each additional 15 min

## 2022-10-06 NOTE — PROGRESS NOTES
[facility] PRE-ADMISSION TESTING INSTRUCTIONS    The Preadmission Testing patient is instructed accordingly using the following criteria (check applicable):    ARRIVAL INSTRUCTIONS:  [x] Parking the day of Surgery is located in the Main Entrance lot. Upon entering the door, make an immediate right to the surgery reception desk    [x] Bring photo ID and insurance card    [] Bring in a copy of Living will or Durable Power of  papers. [x] Please be sure to arrange for responsible adult to provide transportation to and from the hospital    [x] Please arrange for responsible adult to be with you for the 24 hour period post procedure due to having anesthesia    [x] If you awake am of surgery not feeling well or have temperature >100 please call [phone]    GENERAL INSTRUCTIONS:    [x] Nothing by mouth after midnight, including gum, candy, mints or water    [x] You may brush your teeth, but do not swallow any water    [] Take medications as instructed with 1-2 oz of water    [] Stop herbal supplements and vitamins 5 days prior to procedure    [x] Follow preop dosing of blood thinners per physician instructions    [] Take 1/2 dose of evening insulin, but no insulin after midnight    [x] No oral diabetic medications after midnight    [x] If diabetic and have low blood sugar or feel symptomatic, take 1-2oz apple juice only    [] Bring inhalers day of surgery    [] Bring C-PAP/ Bi-Pap day of surgery    [] Bring urine specimen day of surgery    [x] Shower or bath with soap, lather and rinse well, AM of Surgery, no lotion, powders or creams to surgical site    [] Follow bowel prep as instructed per surgeon    [x] No tobacco products within 24 hours of surgery     [x] No alcohol or illegal drug use within 24 hours of surgery.     [x] Jewelry, body piercing's, eyeglasses, contact lenses and dentures are not permitted into surgery (bring cases)      [] Please do not wear any nail polish, make up or hair products on the day of surgery    [x] You can expect a call the business day prior to procedure to notify you if your arrival time changes    [x] If you receive a survey after surgery we would greatly appreciate your comments    [] Parent/guardian of a minor must accompany their child and remain on the premises  the entire time they are under our care     [] Pediatric patients may bring favorite toy, blanket or comfort item with them    [] A caregiver or family member must remain with the patient during their stay if they are mentally handicapped, have dementia, disoriented or unable to use a call light or would be a safety concern if left unattended    [x] Please notify surgeon if you develop any illness between now and time of surgery (cold, cough, sore throat, fever, nausea, vomiting) or any signs of infections  including skin, wounds, and dental.    [x]  The Outpatient Pharmacy is available to fill your prescription here on your day of surgery, ask your preop nurse for details    [] Other instructions    EDUCATIONAL MATERIALS PROVIDED:    [] PAT Preoperative Education Packet/Booklet     [] Medication List    [] Transfusion bracelet applied with instructions    [] Shower with soap, lather and rinse well, and use CHG wipes provided the evening before surgery as instructed    [] Incentive spirometer with instructions

## 2022-10-10 ENCOUNTER — ANESTHESIA EVENT (OUTPATIENT)
Dept: OPERATING ROOM | Age: 72
End: 2022-10-10
Payer: MEDICARE

## 2022-10-10 ENCOUNTER — HOSPITAL ENCOUNTER (OUTPATIENT)
Dept: SPEECH THERAPY | Age: 72
Setting detail: THERAPIES SERIES
Discharge: HOME | End: 2022-10-10
Payer: MEDICARE

## 2022-10-10 PROCEDURE — 97130 THER IVNTJ EA ADDL 15 MIN: CPT

## 2022-10-10 PROCEDURE — 97129 THER IVNTJ 1ST 15 MIN: CPT

## 2022-10-10 NOTE — PROGRESS NOTES
Roselyn Francisco was seen for cognitive therapy. He missed his 8:30 appointment and rescheduled to 11:00. The following was targeted:    Patient completed 2 immediate memory activities. One was with the use of a deck of cards and the other was a memory saul on the [employer]. Patient demonstrated good sustained attention and immediate recall during both activities. He recalled up to 5 targets at a time. Continue plan of care. Treatment plan and goals can be found in the initial evaluation/progress report. Mercedes Oliveira M.S., CCC-SLP/L  Speech-Language Pathologist    CPT CODE:       28883  therapeutic interventions that focus on cognitive function , initial  15 min  06012  therapeutic interventions that focus on cognitive function, each additional 15 min

## 2022-10-10 NOTE — H&P
[address] [facility] Otolaryngology  Dr. Amado Arndt. KASSIE Pond Ms.Ed. New Consult         Patient Name:  Kelly Solano  :  1950      CHIEF C/O:         Chief Complaint   Patient presents with    New Patient       [facility],          HISTORY OBTAINED FROM:  patient     HISTORY OF PRESENT ILLNESS:       Som Cedillo is a 67y.o. year old male, here today for here for evaluation of possible hypoglossal nerve stimulator with a known history of DINO and failure of CPAP therapy. Patient was sent by sleep medicine physician for BMI of 22 and an AHI index of 49. Patient states he had significant difficulty with CPAP mask, as well as continued type symptoms with persistent fatigue. Patient has significant daytime somnolence as well. No current complaints of new nasal congestion headaches vision change no prior oropharyngeal surgeries, no history of tinnitus vertigo hearing loss is changing. No chronic sinusitis. Past Medical History        Past Medical History:   Diagnosis Date    Diabetes ([address]     Hypertension          Past Surgical History   History reviewed. No pertinent surgical history.        Current Medication      Current Outpatient Medications:     albuterol sulfate HFA (VENTOLIN HFA) 108 (90 Base) MCG/ACT inhaler, Inhale 2 puffs into the lungs 4 times daily as needed for Wheezing, Disp: 18 g, Rfl: 0    guaiFENesin (ROBITUSSIN) 100 MG/5ML syrup, Take 200 mg by mouth 3 times daily as needed for Cough, Disp: , Rfl:     diclofenac sodium (VOLTAREN) 1 % GEL, Apply 2 g topically 2 times daily, Disp: 100 g, Rfl: 3    losartan (COZAAR) 100 MG tablet, Take 1 tablet by mouth daily, Disp: 30 tablet, Rfl: 11    naproxen (NAPROSYN) 500 MG tablet, Take 1 tablet by mouth 2 times daily as needed for Pain, Disp: 28 tablet, Rfl: 0    Multiple Vitamins-Minerals (THERAPEUTIC MULTIVITAMIN-MINERALS) tablet, Take 1 tablet by mouth daily, Disp: , Rfl:     aspirin 81 MG EC tablet, Take 81 mg by mouth daily, Disp: , Rfl:     acetaminophen (APAP EXTRA STRENGTH) 500 MG tablet, Take 1 tablet by mouth every 6 hours as needed for Pain, Disp: 120 tablet, Rfl: 3    metFORMIN (GLUCOPHAGE) 850 MG tablet, Take 850 mg by mouth 3 times daily, Disp: , Rfl:     ibuprofen (ADVIL;MOTRIN) 800 MG tablet, Take 1 tablet by mouth every 6 hours as needed for Pain With food to prevent stomach upset, Disp: 20 tablet, Rfl: 0     Patient has no known allergies. Social History           Tobacco Use    Smoking status: Never    Smokeless tobacco: Never   Substance Use Topics    Alcohol use: No    Drug use: No      Family History   History reviewed. No pertinent family history. Review of Systems   Constitutional:  Negative for activity change, chills and fever. HENT:  Negative for congestion, dental problem, ear discharge, hearing loss and sinus pain. Respiratory:  Negative for cough and shortness of breath. Cardiovascular:  Negative for chest pain and palpitations. Gastrointestinal:  Negative for vomiting. Skin:  Negative for rash. Allergic/Immunologic: Negative for environmental allergies. Neurological:  Negative for dizziness and headaches. Hematological:  Does not bruise/bleed easily. All other systems reviewed and are negative. Ht 5' 8\" (1.727 m)   Wt 190 lb (86.2 kg)   BMI 28.89 kg/m²   Physical Exam  Vitals and nursing note reviewed. Constitutional:       Appearance: He is well-developed. HENT:      Head: Normocephalic and atraumatic. No masses. Right Ear: Tympanic membrane and ear canal normal.      Left Ear: Tympanic membrane and ear canal normal.      Nose: No congestion or rhinorrhea. Mouth/Throat:      Pharynx: Oropharynx is clear. No posterior oropharyngeal erythema. Eyes:      Pupils: Pupils are equal, round, and reactive to light. Neck:      Thyroid: No thyromegaly. Trachea: No tracheal deviation. Cardiovascular:      Rate and Rhythm: Normal rate.    Pulmonary:      Effort: Pulmonary effort is normal. No respiratory distress. Musculoskeletal:         General: Normal range of motion. Cervical back: Normal range of motion. Lymphadenopathy:      Cervical: No cervical adenopathy. Skin:     General: Skin is warm. Findings: No erythema. Neurological:      Mental Status: He is alert. Cranial Nerves: No cranial nerve deficit. IMPRESSION/PLN:     Is seen and examined for history of hypoglossal nerve stimulator implantation evaluation with a known history of an AHI index of  , And a BMI of 22. Recommendations are for the patient to continue current medical therapies, follow-up with a drug-induced sleep endoscopy to ensure adequate anterior posterior versus concentric collapse and his candidacy for hypoglossal nerve stimulation. Risk and benefits of 8 drug-induced endoscopically were reviewed, and a brief introduction into the process of actual hypoglossal nerve stimulator implantation was reviewed. Publications were provided both in [place] and in [place] Dr. Alfonso Cuevas [facility] Otolaryngology/Facial Plastic Surgery Residency  Associate Clinical Professor:  Anayeli Galindo, [facility]

## 2022-10-10 NOTE — PROGRESS NOTES
Yohana Alvarado did not show for his scheduled speech appointment. No notification was received. Therapy is expected to resume on the patient's next scheduled visit.

## 2022-10-11 ENCOUNTER — HOSPITAL ENCOUNTER (OUTPATIENT)
Age: 72
Setting detail: OUTPATIENT SURGERY
Discharge: HOME | End: 2022-10-11
Attending: OTOLARYNGOLOGY | Admitting: OTOLARYNGOLOGY
Payer: MEDICARE

## 2022-10-11 ENCOUNTER — ANESTHESIA (OUTPATIENT)
Dept: OPERATING ROOM | Age: 72
End: 2022-10-11
Payer: MEDICARE

## 2022-10-11 VITALS
WEIGHT: 190 LBS | SYSTOLIC BLOOD PRESSURE: 143 MMHG | RESPIRATION RATE: 18 BRPM | DIASTOLIC BLOOD PRESSURE: 72 MMHG | OXYGEN SATURATION: 99 % | TEMPERATURE: 97 F | BODY MASS INDEX: 28.79 KG/M2 | HEIGHT: 68 IN | HEART RATE: 71 BPM

## 2022-10-11 LAB
ANION GAP SERPL CALCULATED.3IONS-SCNC: 9 MMOL/L (ref 7–16)
BUN BLDV-MCNC: 18 MG/DL (ref 6–23)
CALCIUM SERPL-MCNC: 9.8 MG/DL (ref 8.6–10.2)
CHLORIDE BLD-SCNC: 100 MMOL/L (ref 98–107)
CO2: 29 MMOL/L (ref 22–29)
CREAT SERPL-MCNC: 0.8 MG/DL (ref 0.7–1.2)
EKG ATRIAL RATE: 74 BPM
EKG P AXIS: 30 DEGREES
EKG P-R INTERVAL: 216 MS
EKG Q-T INTERVAL: 380 MS
EKG QRS DURATION: 96 MS
EKG QTC CALCULATION (BAZETT): 421 MS
EKG R AXIS: 57 DEGREES
EKG T AXIS: 66 DEGREES
EKG VENTRICULAR RATE: 74 BPM
GFR AFRICAN AMERICAN: >60
GFR NON-AFRICAN AMERICAN: >60 ML/MIN/1.73
GLUCOSE BLD-MCNC: 146 MG/DL (ref 74–99)
HCT VFR BLD CALC: 40 % (ref 37–54)
HEMOGLOBIN: 13.6 G/DL (ref 12.5–16.5)
MCH RBC QN AUTO: 31.3 PG (ref 26–35)
MCHC RBC AUTO-ENTMCNC: 34 % (ref 32–34.5)
MCV RBC AUTO: 92.2 FL (ref 80–99.9)
PDW BLD-RTO: 12.4 FL (ref 11.5–15)
PLATELET # BLD: 302 E9/L (ref 130–450)
PMV BLD AUTO: 9.7 FL (ref 7–12)
POTASSIUM SERPL-SCNC: 4.1 MMOL/L (ref 3.5–5)
RBC # BLD: 4.34 E12/L (ref 3.8–5.8)
SODIUM BLD-SCNC: 138 MMOL/L (ref 132–146)
WBC # BLD: 6.7 E9/L (ref 4.5–11.5)

## 2022-10-11 PROCEDURE — 85027 COMPLETE CBC AUTOMATED: CPT

## 2022-10-11 PROCEDURE — 7100000011 HC PHASE II RECOVERY - ADDTL 15 MIN: Performed by: OTOLARYNGOLOGY

## 2022-10-11 PROCEDURE — 6360000002 HC RX W HCPCS: Performed by: NURSE ANESTHETIST, CERTIFIED REGISTERED

## 2022-10-11 PROCEDURE — 93005 ELECTROCARDIOGRAM TRACING: CPT

## 2022-10-11 PROCEDURE — 2500000003 HC RX 250 WO HCPCS: Performed by: NURSE ANESTHETIST, CERTIFIED REGISTERED

## 2022-10-11 PROCEDURE — 80048 BASIC METABOLIC PNL TOTAL CA: CPT

## 2022-10-11 PROCEDURE — 42975 DISE EVAL SLP DO BRTH FLX DX: CPT | Performed by: OTOLARYNGOLOGY

## 2022-10-11 PROCEDURE — 2580000003 HC RX 258

## 2022-10-11 PROCEDURE — 2709999900 HC NON-CHARGEABLE SUPPLY: Performed by: OTOLARYNGOLOGY

## 2022-10-11 PROCEDURE — 3600000001 HC SURGERY LEVEL 1  BASE: Performed by: OTOLARYNGOLOGY

## 2022-10-11 PROCEDURE — 7100000010 HC PHASE II RECOVERY - FIRST 15 MIN: Performed by: OTOLARYNGOLOGY

## 2022-10-11 PROCEDURE — 36415 COLL VENOUS BLD VENIPUNCTURE: CPT

## 2022-10-11 PROCEDURE — 3700000001 HC ADD 15 MINUTES (ANESTHESIA): Performed by: OTOLARYNGOLOGY

## 2022-10-11 PROCEDURE — 3700000000 HC ANESTHESIA ATTENDED CARE: Performed by: OTOLARYNGOLOGY

## 2022-10-11 PROCEDURE — 3600000011 HC SURGERY LEVEL 1  ADDTL 15MIN: Performed by: OTOLARYNGOLOGY

## 2022-10-11 RX ORDER — FENTANYL CITRATE 50 UG/ML
25 INJECTION, SOLUTION INTRAMUSCULAR; INTRAVENOUS EVERY 5 MIN PRN
Status: CANCELLED | OUTPATIENT
Start: 2022-10-11

## 2022-10-11 RX ORDER — SODIUM CHLORIDE 0.9 % (FLUSH) 0.9 %
5-40 SYRINGE (ML) INJECTION EVERY 12 HOURS SCHEDULED
Status: CANCELLED | OUTPATIENT
Start: 2022-10-11

## 2022-10-11 RX ORDER — FENTANYL CITRATE 50 UG/ML
50 INJECTION, SOLUTION INTRAMUSCULAR; INTRAVENOUS EVERY 5 MIN PRN
Status: CANCELLED | OUTPATIENT
Start: 2022-10-11

## 2022-10-11 RX ORDER — SODIUM CHLORIDE 9 MG/ML
INJECTION, SOLUTION INTRAVENOUS PRN
Status: DISCONTINUED | OUTPATIENT
Start: 2022-10-11 | End: 2022-10-11 | Stop reason: HOSPADM

## 2022-10-11 RX ORDER — SODIUM CHLORIDE 9 MG/ML
INJECTION, SOLUTION INTRAVENOUS PRN
Status: CANCELLED | OUTPATIENT
Start: 2022-10-11

## 2022-10-11 RX ORDER — SODIUM CHLORIDE 0.9 % (FLUSH) 0.9 %
5-40 SYRINGE (ML) INJECTION EVERY 12 HOURS SCHEDULED
Status: DISCONTINUED | OUTPATIENT
Start: 2022-10-11 | End: 2022-10-11 | Stop reason: HOSPADM

## 2022-10-11 RX ORDER — ONDANSETRON 2 MG/ML
INJECTION INTRAMUSCULAR; INTRAVENOUS PRN
Status: DISCONTINUED | OUTPATIENT
Start: 2022-10-11 | End: 2022-10-11 | Stop reason: SDUPTHER

## 2022-10-11 RX ORDER — SODIUM CHLORIDE 0.9 % (FLUSH) 0.9 %
5-40 SYRINGE (ML) INJECTION PRN
Status: CANCELLED | OUTPATIENT
Start: 2022-10-11

## 2022-10-11 RX ORDER — PROCHLORPERAZINE EDISYLATE 5 MG/ML
5 INJECTION INTRAMUSCULAR; INTRAVENOUS
Status: CANCELLED | OUTPATIENT
Start: 2022-10-11 | End: 2022-10-12

## 2022-10-11 RX ORDER — GLYCOPYRROLATE 0.2 MG/ML
INJECTION INTRAMUSCULAR; INTRAVENOUS PRN
Status: DISCONTINUED | OUTPATIENT
Start: 2022-10-11 | End: 2022-10-11 | Stop reason: SDUPTHER

## 2022-10-11 RX ORDER — SODIUM CHLORIDE 0.9 % (FLUSH) 0.9 %
5-40 SYRINGE (ML) INJECTION PRN
Status: DISCONTINUED | OUTPATIENT
Start: 2022-10-11 | End: 2022-10-11 | Stop reason: HOSPADM

## 2022-10-11 RX ORDER — PROPOFOL 10 MG/ML
INJECTION, EMULSION INTRAVENOUS PRN
Status: DISCONTINUED | OUTPATIENT
Start: 2022-10-11 | End: 2022-10-11 | Stop reason: SDUPTHER

## 2022-10-11 RX ADMIN — SODIUM CHLORIDE: 9 INJECTION, SOLUTION INTRAVENOUS at 12:37

## 2022-10-11 RX ADMIN — GLYCOPYRROLATE 0.1 MG: 0.2 INJECTION, SOLUTION INTRAMUSCULAR; INTRAVENOUS at 12:38

## 2022-10-11 RX ADMIN — PROPOFOL 25 MG: 10 INJECTION, EMULSION INTRAVENOUS at 12:46

## 2022-10-11 RX ADMIN — ONDANSETRON 4 MG: 2 INJECTION INTRAMUSCULAR; INTRAVENOUS at 12:40

## 2022-10-11 RX ADMIN — PROPOFOL 25 MG: 10 INJECTION, EMULSION INTRAVENOUS at 12:47

## 2022-10-11 RX ADMIN — PROPOFOL 50 MG: 10 INJECTION, EMULSION INTRAVENOUS at 12:45

## 2022-10-11 ASSESSMENT — PAIN - FUNCTIONAL ASSESSMENT
PAIN_FUNCTIONAL_ASSESSMENT: 0-10
PAIN_FUNCTIONAL_ASSESSMENT: 0-10

## 2022-10-11 ASSESSMENT — PAIN SCALES - GENERAL
PAINLEVEL_OUTOF10: 0
PAINLEVEL_OUTOF10: 0

## 2022-10-11 NOTE — PROGRESS NOTES
, Leah [id number], used for discharge instructions with good understanding. Son present for discharge instructions.

## 2022-10-11 NOTE — ANESTHESIA PRE PROCEDURE
Department of Anesthesiology  Preprocedure Note       Name:  Charleston Kanner   Age:  67 y.o.  :  1950                                          MRN:  [id number]         Date:  10/11/2022      Surgeon: Ollie Lamas):  Bee Arciniega DO    Procedure: Procedure(s):  DRUG INDUCED SLEEP ENDOSCOPY    Medications prior to admission:   Prior to Admission medications    Medication Sig Start Date End Date Taking? Authorizing Provider   guaiFENesin (ROBITUSSIN) 100 MG/5ML syrup Take 200 mg by mouth 3 times daily as needed for Cough    Historical Provider, MD   diclofenac sodium (VOLTAREN) 1 % GEL Apply 2 g topically 2 times daily 21   Jennifer Todd PA-C   losartan (COZAAR) 100 MG tablet Take 1 tablet by mouth daily 21   LEAH Rubio - CNP   Multiple Vitamins-Minerals (THERAPEUTIC MULTIVITAMIN-MINERALS) tablet Take 1 tablet by mouth daily    Historical Provider, MD   aspirin 81 MG EC tablet Take 81 mg by mouth daily    Historical Provider, MD   acetaminophen (APAP EXTRA STRENGTH) 500 MG tablet Take 1 tablet by mouth every 6 hours as needed for Pain 18   KARINE Sewell   metFORMIN (GLUCOPHAGE) 850 MG tablet Take 850 mg by mouth 3 times daily    Historical Provider, MD       Current medications:    Current Facility-Administered Medications   Medication Dose Route Frequency Provider Last Rate Last Admin    sodium chloride flush 0.9 % injection 5-40 mL  5-40 mL IntraVENous 2 times per day Cathaleen Greulich, DO        sodium chloride flush 0.9 % injection 5-40 mL  5-40 mL IntraVENous PRN Cathaleen Greulich, DO        0.9 % sodium chloride infusion   IntraVENous PRN Cathaleen Greulich, DO           Allergies:  No Known Allergies    Problem List:  There is no problem list on file for this patient.       Past Medical History:        Diagnosis Date    Diabetes ([address]     Hypertension     DINO (obstructive sleep apnea)        Past Surgical History:        Procedure Laterality Date    BACK SURGERY         Social History:    Social History     Tobacco Use    Smoking status: Never    Smokeless tobacco: Never   Substance Use Topics    Alcohol use: No                                Counseling given: Not Answered      Vital Signs (Current):   Vitals:    10/06/22 1318 10/11/22 1054 10/11/22 1107   BP:  (!) 204/92    Pulse:  77    Resp:  16    Temp:   98.3 °F (36.8 °C)   TempSrc:   Temporal   SpO2:  98%    Weight: 190 lb (86.2 kg) 190 lb (86.2 kg)    Height: 5' 8\" (1.727 m) 5' 8\" (1.727 m)                                               BP Readings from Last 3 Encounters:   10/11/22 (!) 204/92   08/08/22 (!) 178/76   07/11/22 (!) 174/83       NPO Status:                                                                                 BMI:   Wt Readings from Last 3 Encounters:   10/11/22 190 lb (86.2 kg)   07/25/22 190 lb (86.2 kg)   07/11/22 190 lb (86.2 kg)     Body mass index is 28.89 kg/m². CBC:   Lab Results   Component Value Date/Time    WBC 7.8 07/11/2022 12:18 PM    RBC 4.29 07/11/2022 12:18 PM    HGB 13.2 07/11/2022 12:18 PM    HCT 39.1 07/11/2022 12:18 PM    MCV 91.1 07/11/2022 12:18 PM    RDW 12.2 07/11/2022 12:18 PM     07/11/2022 12:18 PM       CMP:   Lab Results   Component Value Date/Time     07/25/2022 10:00 AM    K 3.6 07/25/2022 10:00 AM    K 4.4 07/11/2022 12:18 PM     07/25/2022 10:00 AM    CO2 24 07/25/2022 10:00 AM    BUN 12 07/25/2022 10:00 AM    CREATININE 0.7 07/25/2022 10:00 AM    GFRAA >60 07/25/2022 10:00 AM    LABGLOM >60 07/25/2022 10:00 AM    GLUCOSE 112 07/25/2022 10:00 AM    PROT 7.1 07/25/2022 10:00 AM    CALCIUM 8.8 07/25/2022 10:00 AM    BILITOT 0.3 07/25/2022 10:00 AM    ALKPHOS 95 07/25/2022 10:00 AM    AST 21 07/25/2022 10:00 AM    ALT 23 07/25/2022 10:00 AM       POC Tests: No results for input(s): POCGLU, POCNA, POCK, POCCL, POCBUN, POCHEMO, POCHCT in the last 72 hours. Coags: No results found for: PROTIME, INR, APTT    HCG (If Applicable):  No results found for: PREGTESTUR, PREGSERUM, HCG, HCGQUANT     ABGs: No results found for: PHART, PO2ART, FLF5ULN, KQS7DIZ, BEART, D6NVNLEY     Type & Screen (If Applicable):  No results found for: LABABO, LABRH    Drug/Infectious Status (If Applicable):  No results found for: HIV, HEPCAB    COVID-19 Screening (If Applicable):   Lab Results   Component Value Date/Time    COVID19 Not Detected 07/11/2022 12:18 PM    COVID19 Not Detected 05/24/2021 08:23 AM           Anesthesia Evaluation  Patient summary reviewed and Nursing notes reviewed no history of anesthetic complications:   Airway: Mallampati: III  TM distance: >3 FB   Neck ROM: full  Mouth opening: > = 3 FB   Dental:    (+) partials      Pulmonary:normal exam    (+) sleep apnea:                             Cardiovascular:    (+) hypertension:,                   Neuro/Psych:   (+) CVA:, TIA,             GI/Hepatic/Renal: Neg GI/Hepatic/Renal ROS            Endo/Other:    (+) DiabetesType II DM, , .                 Abdominal:             Vascular: negative vascular ROS. Other Findings:           Anesthesia Plan      general     ASA 2       Induction: intravenous. MIPS: Postoperative opioids intended and Prophylactic antiemetics administered. Anesthetic plan and risks discussed with patient (interpretor Miri Aparicio). Plan discussed with CRNA.                     Valentina Masterson MD   10/11/2022      Note reviewed and agree with plan Anirudh Gomez MD

## 2022-10-11 NOTE — DISCHARGE INSTRUCTIONS
Resume diet  No driving until tomorrow  Resume normal activities tomorrow  Resume home medications.   Follow up with doctor

## 2022-10-11 NOTE — H&P
This is my H&P update. There will not be another H&P update, you may take the patient back to the operating room if you have read this. I have seen and examined the patient, I reviewed the H&P, there are no new changes. This is the end of the H&P update      Dr. Yulisa Pond D.O., Ms. Sanjay Viji.  Otolaryngology Facial Plastic Surgery  : [facility] Otolaryngology/Facial Plastic Surgery Residency    Associate Clinical Professor: Misty Faria, [facility]

## 2022-10-11 NOTE — H&P
Emy Bowers was seen and re-examined preoperatively today, October 11, 2022. There was no substantial change in his physical and medical status. Patient is fit for the proposed surgical procedure. All questions were appropriately addressed and had no further questions regarding the risks, benefits, and alternatives of the procedure. Emy Bowers and family wished to proceed.     Mirna Camejo DO  Resident Physician  [facility])  Otolaryngology Residency  10/11/2022  10:52 AM

## 2022-10-12 NOTE — ANESTHESIA POSTPROCEDURE EVALUATION
Department of Anesthesiology  Postprocedure Note    Patient: Kelly Solano  MRN: [id number]  YOB: 1950  Date of evaluation: 10/11/2022      Procedure Summary     Date: 10/11/22 Room / Location: [address]    Anesthesia Start: 3291 Anesthesia Stop: 2312    Procedure: DRUG INDUCED SLEEP ENDOSCOPY (Throat) Diagnosis:       Obstructive sleep apnea      (Obstructive sleep apnea [G47.33])    Surgeons: Laila Rizo DO Responsible Provider: Himanshu Brooks MD    Anesthesia Type: general ASA Status: 2          Anesthesia Type: No value filed.     Haven Phase I: Haven Score: 10    Haven Phase II: Haven Score: 10      Anesthesia Post Evaluation    Patient location during evaluation: PACU  Patient participation: complete - patient participated  Level of consciousness: awake and alert  Pain score: 0  Airway patency: patent  Nausea & Vomiting: no vomiting and no nausea  Complications: no  Cardiovascular status: hemodynamically stable  Respiratory status: spontaneous ventilation  Hydration status: stable

## 2022-10-17 ENCOUNTER — OFFICE VISIT (OUTPATIENT)
Dept: ENT CLINIC | Age: 72
End: 2022-10-17
Payer: MEDICARE

## 2022-10-17 ENCOUNTER — HOSPITAL ENCOUNTER (OUTPATIENT)
Dept: SPEECH THERAPY | Age: 72
Setting detail: THERAPIES SERIES
Discharge: HOME | End: 2022-10-17
Payer: MEDICARE

## 2022-10-17 VITALS
BODY MASS INDEX: 28.79 KG/M2 | DIASTOLIC BLOOD PRESSURE: 89 MMHG | HEART RATE: 75 BPM | SYSTOLIC BLOOD PRESSURE: 173 MMHG | HEIGHT: 68 IN | WEIGHT: 190 LBS

## 2022-10-17 DIAGNOSIS — Z99.89 OSA ON CPAP: Primary | ICD-10-CM

## 2022-10-17 DIAGNOSIS — G47.33 OSA ON CPAP: Primary | ICD-10-CM

## 2022-10-17 PROCEDURE — 1036F TOBACCO NON-USER: CPT | Performed by: OTOLARYNGOLOGY

## 2022-10-17 PROCEDURE — 1123F ACP DISCUSS/DSCN MKR DOCD: CPT | Performed by: OTOLARYNGOLOGY

## 2022-10-17 PROCEDURE — G8427 DOCREV CUR MEDS BY ELIG CLIN: HCPCS | Performed by: OTOLARYNGOLOGY

## 2022-10-17 PROCEDURE — 3078F DIAST BP <80 MM HG: CPT | Performed by: OTOLARYNGOLOGY

## 2022-10-17 PROCEDURE — 99213 OFFICE O/P EST LOW 20 MIN: CPT | Performed by: OTOLARYNGOLOGY

## 2022-10-17 PROCEDURE — 3017F COLORECTAL CA SCREEN DOC REV: CPT | Performed by: OTOLARYNGOLOGY

## 2022-10-17 PROCEDURE — 3074F SYST BP LT 130 MM HG: CPT | Performed by: OTOLARYNGOLOGY

## 2022-10-17 PROCEDURE — G8484 FLU IMMUNIZE NO ADMIN: HCPCS | Performed by: OTOLARYNGOLOGY

## 2022-10-17 PROCEDURE — G8417 CALC BMI ABV UP PARAM F/U: HCPCS | Performed by: OTOLARYNGOLOGY

## 2022-10-17 NOTE — PROGRESS NOTES
Patient called this morning to cancel his appointment for today due to a conflict in his schedule. Therapy is expected to resume on the patient's next scheduled visit.

## 2022-10-17 NOTE — PROGRESS NOTES
[facility]  Dr. Vaughan Boeck. Kavitha Grewal. Ms.Ed        Patient Name:  Jud Gallagher  :  1950     CHIEF C/O:    Chief Complaint   Patient presents with    Follow-up     1 week f/u P/O dise       HISTORY OBTAINED FROM:  patient    HISTORY OF PRESENT ILLNESS:       Ave Donald is a 67y.o. year old male, here today for follow up of follow-up for history of drug-induced sleep endoscopy for possible hypoglossal nerve stimulator implantation. Patient did well postoperatively, and reviewed as a complete anterior posterior collapse with no significant concentric portion. Full discussion of the implantation process as well as activation was reviewed this with the patient today.       Past Medical History:   Diagnosis Date    Diabetes ([address]     High cholesterol     Hypertension     Mild cognitive impairment     DINO (obstructive sleep apnea)     Pneumonia due to COVID-19 virus      Past Surgical History:   Procedure Laterality Date    BACK SURGERY      lumbar discectomy    EXAMINATION UNDER ANESTHESIA N/A 10/11/2022    DRUG INDUCED SLEEP ENDOSCOPY performed by Robyn Parham DO at [address] [place] [address]         Current Outpatient Medications:     diclofenac sodium (VOLTAREN) 1 % GEL, Apply 2 g topically 2 times daily (Patient not taking: Reported on 2022), Disp: 100 g, Rfl: 3    Multiple Vitamins-Minerals (THERAPEUTIC MULTIVITAMIN-MINERALS) tablet, Take 1 tablet by mouth daily, Disp: , Rfl:     aspirin 81 MG EC tablet, Take 1 tablet by mouth daily, Disp: 90 tablet, Rfl: 0    losartan (COZAAR) 100 MG tablet, Take 1 tablet by mouth daily, Disp: 90 tablet, Rfl: 0    metFORMIN (GLUCOPHAGE) 850 MG tablet, TAKE 1 TABLET(850 MG) BY MOUTH THREE TIMES DAILY WITH MEALS, Disp: 270 tablet, Rfl: 0    simvastatin (ZOCOR) 20 MG tablet, Take 1 tablet by mouth daily, Disp: 90 tablet, Rfl: 0    diclofenac (VOLTAREN) 50 MG EC tablet, Take 50 mg by mouth 2 times daily as needed for Pain, Disp: , Rfl: Njrbvezjg-Iuupxfelr-Ixaglluvnk (METAFOLBIC PLUS) 6-2-600 MG TABS, Take 1 tablet by mouth daily (Patient not taking: Reported on 11/1/2022), Disp: , Rfl:   Patient has no known allergies. Social History     Tobacco Use    Smoking status: Never    Smokeless tobacco: Never   Vaping Use    Vaping Use: Never used   Substance Use Topics    Alcohol use: No    Drug use: No     History reviewed. No pertinent family history. Review of Systems   Constitutional:  Negative for chills and fever. HENT:  Negative for ear discharge and hearing loss. Respiratory:  Negative for cough and shortness of breath. Cardiovascular:  Negative for chest pain and palpitations. Gastrointestinal:  Negative for vomiting. Skin:  Negative for rash. Allergic/Immunologic: Negative for environmental allergies. Neurological:  Negative for dizziness and headaches. Hematological:  Does not bruise/bleed easily. All other systems reviewed and are negative. BP (!) 173/89   Pulse 75   Ht 5' 8\" (1.727 m)   Wt 190 lb (86.2 kg)   BMI 28.89 kg/m²   Physical Exam  Vitals and nursing note reviewed. Constitutional:       Appearance: He is well-developed. HENT:      Head: Normocephalic and atraumatic. Right Ear: Tympanic membrane and ear canal normal.      Left Ear: Tympanic membrane and ear canal normal.      Nose: No congestion or rhinorrhea. Eyes:      Pupils: Pupils are equal, round, and reactive to light. Neck:      Thyroid: No thyromegaly. Trachea: No tracheal deviation. Cardiovascular:      Rate and Rhythm: Normal rate. Pulmonary:      Effort: Pulmonary effort is normal. No respiratory distress. Musculoskeletal:         General: Normal range of motion. Cervical back: Normal range of motion. Lymphadenopathy:      Cervical: No cervical adenopathy. Skin:     General: Skin is warm. Findings: No erythema. Neurological:      Mental Status: He is alert.       Cranial Nerves: No cranial nerve deficit. IMPRESSION/PLAN:  Patient seen and examined with known severe DINO intolerant to CPAP recommendations are for the patient to undergo hypoglossal nerve stimulator as he has passed all criteria from BMI AHI and dice. Risk and benefits of the procedure including infection bleeding pneumothorax and need for removal of infection occurs understanding that battery changes will be a possibility in the future, nerve damage including both marginal mandibular as well as hypoglossal nerve damage were all reviewed today. He will follow-up in 1 week postoperatively 6 weeks postoperatively and then activation as long as there is no significant concerns. Dr. Bud Pond D.O.  Ms. Olson Husbands.  Otolaryngology Facial Plastic Surgery  :  [facility] Otolaryngology/Facial Plastic Surgery Residency  Associate Clinical Professor:  Luann Zamarripa, [facility]

## 2022-10-18 PROBLEM — G47.33 OSA (OBSTRUCTIVE SLEEP APNEA): Status: ACTIVE | Noted: 2022-10-18

## 2022-10-19 RX ORDER — SIMVASTATIN 20 MG
20 TABLET ORAL DAILY
COMMUNITY
End: 2022-11-01 | Stop reason: SDUPTHER

## 2022-10-19 RX ORDER — ACETYLCYST/METHYLB12/LEVOMEFOL 600-2-6 MG
1 TABLET ORAL DAILY
COMMUNITY

## 2022-10-24 ENCOUNTER — HOSPITAL ENCOUNTER (OUTPATIENT)
Dept: SPEECH THERAPY | Age: 72
Setting detail: THERAPIES SERIES
Discharge: HOME | End: 2022-10-24
Payer: MEDICARE

## 2022-10-24 NOTE — PROGRESS NOTES
Kimberly Alejandra did not show for his scheduled speech appointment. No notification was received. Therapy is expected to resume on the patient's next scheduled visit.

## 2022-10-31 ENCOUNTER — HOSPITAL ENCOUNTER (OUTPATIENT)
Dept: SPEECH THERAPY | Age: 72
Setting detail: THERAPIES SERIES
Discharge: HOME | End: 2022-10-31
Payer: MEDICARE

## 2022-10-31 PROCEDURE — 97130 THER IVNTJ EA ADDL 15 MIN: CPT

## 2022-10-31 PROCEDURE — 97129 THER IVNTJ 1ST 15 MIN: CPT

## 2022-10-31 NOTE — PROGRESS NOTES
Emy Bowers was seen for cognitive therapy. The [facility]  application was utilized. , [employer], assisted with the session. The following was targeted:    Patient recalled 1-3 step directives and followed the instruction on a handout provided. Patient demonstrated 83% accuracy. Repetition of instructions was necessary as the directions became more complex. Repetition was provided on 27% of the trials. Continue plan of care. Treatment plan and goals can be found in the initial evaluation/progress report. Mercedes Schumacher M.S., CCC-SLP/L  Speech-Language Pathologist    CPT CODE:       16681  therapeutic interventions that focus on cognitive function , initial  15 min  68433  therapeutic interventions that focus on cognitive function, each additional 15 min

## 2022-10-31 NOTE — PROGRESS NOTES
[address] [place] [address]  Outpatient Speech Therapy  Phone: [phone]            Fax: [phone] OF CARE    PATIENT NAME:  Ailyn Martinez  (male)                MRN:  [id number]                       :  1950  (66 y.o.)  STATUS:  Outpatient clinic       TODAY'S DATE:  10/31/2022  REFERRING PROVIDER:    PATRICA Jesus        PROVIDER NPI:  [id number]  SPECIFIC PROVIDER ORDER: [facility]  Date of order:  22  EVALUATING THERAPIST: HOSSEIN Saldivar     CERTIFICATION/RECERTIFICATION PERIOD: 10/31/22 to 23  INSURANCE PROVIDER:  Payor: [employer] / Plan: [employer] / Product Type: *No Product type* /      INSURANCE ID:  [id number] - (Medicare Managed)              SECONDARY INSURANCE (if applicable): MEDICA[place]     CPT Codes  EVALUATION:            98831  standardized cognitive performance testing (per hour)     TREATMENT:             Requesting treatment authorization for  14 visits over 14 weeks focusing on the following CPT codes:                                                 26794  therapeutic interventions that focus on cognitive function , initial  15 min  55268  therapeutic interventions that focus on cognitive function, each additional 15 min     Patient has completed 13:19 recommended visits. An additional 12 visits is recommended for this quarter for a total of 25 visits. REFERRING/TREATMENT DIAGNOSIS: Cognitive communication deficit [R41.841]           SPEECH THERAPY  PLAN OF CARE   The speech therapy  POC is established based on physician order, speech pathology diagnosis and results of clinical assessment     SPEECH PATHOLOGY DIAGNOSIS:      Admission:  Mild cognitive deficit    Current:  Mild cognitive deficit     Outpatient Speech Pathology intervention is recommended 1 time per week for the above certification period. Conditions Requiring Skilled Therapeutic Intervention for speech, language and/or cognition  Cognitive linguistic impairment  Decreased safety awareness  Decreased short term memory  Decreased problem solving skills  Decreased thought organization     Specific Speech Therapy Interventions to Include: Therapeutic tasks for Cognition        SHORT/LONG TERM GOALS:  Progress in therapy has been recorded using the following key:  NC= no change; IMP= improved; ACH= achieved; NEI= not enough information    1. Patient will improve immediate memory for functional tasks such as recording phone messages to a supervisory assistance level with greater than 90% accuracy and the use of compensatory aids. -IMP  2. Patient will improve recent memory including temporal/spatial orientation to a supervisory assistance level with greater than 90% accuracy with the use of a memory log/calendar aid.-IMP  3. Patient will complete the Assessment for Language Related Functional Activities-NC  4. Patient will identify and spontaneously use compensatory techniques to assist with memory with greater than 90% accuracy-IMP  5. Patient will improve problem solving for functional activities such as financial, medication, and schedule management to a supervisory assistance level with greater than 90% accuracy and the use of compensatory aids/strategies. -IMP      UPDATED PLAN OF CARE:  1. Patient will improve immediate memory for functional tasks such as recording phone messages to a supervisory assistance level with greater than 90% accuracy and the use of compensatory aids. 2.  Patient will improve recent memory including temporal/spatial orientation to a supervisory assistance level with greater than 90% accuracy with the use of a memory log/calendar aid. 3.  Patient will complete the Assessment for Language Related Functional Activities  4.   Patient will identify and spontaneously use compensatory techniques to assist with memory with greater than 90% accuracy  5. Patient will improve problem solving for functional activities such as financial, medication, and schedule management to a supervisory assistance level with greater than 90% accuracy and the use of compensatory aids/strategies. Rehabilitation Potential/Prognosis: good                                                                                                                                                                                                                                                     Cognitive Evaluation:  Stimulus questions were obtained from the RIPA-G. At the time of initial evaluation on 5/16/22, a Mongolian tele- was used for the assessment.        Ross Information Processing Assessment-Geriatric:    Subtest Raw Score Percentile Standard Score Severity          Immediate Memory 23 25 8 Severe-  moderate   Recent Memory 29 95 15 [facility]   Temporal Orientation 30 95 15 [facility]   Spatial Orientation 26 63 11 moderate   Orientation to Environment 27 75 12 Moderate-  mild   Recall of General Information 27 75 12 Moderate-  mild   Problem Solving & Abstract Reasoning 30 95 15 [facility]   Organization of Information 27 91 14 [facility]   Auditory Processing & Comprehension 30 95 15 [facility]   Problem Solving and [place] Reasoning 29 91 14 [facility]   Naming Common Objects 30 91 14 [facility]   Functional Oral Reading 30 91 14 [facility]     Composites Sum of Standard Scores Quotients Percentile Rank Severity Rating   Information Processing 131 122 93 [facility]   Memory 35 111 77 mild   Orientation 38 117 87 mild   Problem Solving 43 128 97 [facility]       VARIANT OBSERVATIONS:     Present Absent   Error (e) x    Perseveration (p)  x   Repeat Instructions/Stimulus (r) x    Denial/Refusal (d) x    Delayed Response (dl) x    Confabulation (c)  x   Partially Correct (pc) x    Irrelevant (i)  x   Tangential (t)  x   Self-corrected (sc) x SUMMARY:  Mustapha Jimenez has demonstrated improvement in response to speech therapy intervention. Continued therapy is recommended in order to address the above mentioned needs. Prognosis for continued improvement is good. Mercedes Dia M.S., TRICIA-SLP/L  Speech-Language Pathologist        [facility] [address]     Phone: [phone]     If you have any questions or concerns, please don't hesitate to call. Thank you for your referral.     Physician/Provider Signature:________________________________Date:__________________  By signing above, the therapists plan is approved by the physician/provider. All patients under [employer] must be signed by physician/provider.

## 2022-11-01 ENCOUNTER — OFFICE VISIT (OUTPATIENT)
Dept: PRIMARY CARE CLINIC | Age: 72
End: 2022-11-01
Payer: MEDICARE

## 2022-11-01 VITALS
SYSTOLIC BLOOD PRESSURE: 122 MMHG | OXYGEN SATURATION: 97 % | DIASTOLIC BLOOD PRESSURE: 68 MMHG | HEIGHT: 68 IN | WEIGHT: 189 LBS | BODY MASS INDEX: 28.64 KG/M2 | TEMPERATURE: 97.3 F | HEART RATE: 79 BPM

## 2022-11-01 DIAGNOSIS — E11.9 TYPE 2 DIABETES MELLITUS WITHOUT COMPLICATION, WITHOUT LONG-TERM CURRENT USE OF INSULIN (HCC): ICD-10-CM

## 2022-11-01 DIAGNOSIS — R41.89 COGNITIVE DEFICITS: ICD-10-CM

## 2022-11-01 DIAGNOSIS — I10 PRIMARY HYPERTENSION: ICD-10-CM

## 2022-11-01 DIAGNOSIS — E78.5 SERUM LIPIDS HIGH: ICD-10-CM

## 2022-11-01 PROCEDURE — 1036F TOBACCO NON-USER: CPT | Performed by: INTERNAL MEDICINE

## 2022-11-01 PROCEDURE — 1123F ACP DISCUSS/DSCN MKR DOCD: CPT | Performed by: INTERNAL MEDICINE

## 2022-11-01 PROCEDURE — G8427 DOCREV CUR MEDS BY ELIG CLIN: HCPCS | Performed by: INTERNAL MEDICINE

## 2022-11-01 PROCEDURE — 3051F HG A1C>EQUAL 7.0%<8.0%: CPT | Performed by: INTERNAL MEDICINE

## 2022-11-01 PROCEDURE — 2022F DILAT RTA XM EVC RTNOPTHY: CPT | Performed by: INTERNAL MEDICINE

## 2022-11-01 PROCEDURE — 3017F COLORECTAL CA SCREEN DOC REV: CPT | Performed by: INTERNAL MEDICINE

## 2022-11-01 PROCEDURE — 3078F DIAST BP <80 MM HG: CPT | Performed by: INTERNAL MEDICINE

## 2022-11-01 PROCEDURE — 99213 OFFICE O/P EST LOW 20 MIN: CPT | Performed by: INTERNAL MEDICINE

## 2022-11-01 PROCEDURE — G0008 ADMIN INFLUENZA VIRUS VAC: HCPCS | Performed by: INTERNAL MEDICINE

## 2022-11-01 PROCEDURE — G8484 FLU IMMUNIZE NO ADMIN: HCPCS | Performed by: INTERNAL MEDICINE

## 2022-11-01 PROCEDURE — G8417 CALC BMI ABV UP PARAM F/U: HCPCS | Performed by: INTERNAL MEDICINE

## 2022-11-01 PROCEDURE — 3074F SYST BP LT 130 MM HG: CPT | Performed by: INTERNAL MEDICINE

## 2022-11-01 PROCEDURE — 90694 VACC AIIV4 NO PRSRV 0.5ML IM: CPT | Performed by: INTERNAL MEDICINE

## 2022-11-01 RX ORDER — SIMVASTATIN 20 MG
20 TABLET ORAL DAILY
Qty: 90 TABLET | Refills: 0 | Status: SHIPPED | OUTPATIENT
Start: 2022-11-01

## 2022-11-01 RX ORDER — LOSARTAN POTASSIUM 100 MG/1
100 TABLET ORAL DAILY
Qty: 90 TABLET | Refills: 0 | Status: SHIPPED | OUTPATIENT
Start: 2022-11-01

## 2022-11-01 RX ORDER — ASPIRIN 81 MG/1
81 TABLET ORAL DAILY
Qty: 90 TABLET | Refills: 0 | Status: SHIPPED | OUTPATIENT
Start: 2022-11-01

## 2022-11-01 SDOH — ECONOMIC STABILITY: FOOD INSECURITY: WITHIN THE PAST 12 MONTHS, THE FOOD YOU BOUGHT JUST DIDN'T LAST AND YOU DIDN'T HAVE MONEY TO GET MORE.: PATIENT DECLINED

## 2022-11-01 SDOH — ECONOMIC STABILITY: FOOD INSECURITY: WITHIN THE PAST 12 MONTHS, YOU WORRIED THAT YOUR FOOD WOULD RUN OUT BEFORE YOU GOT MONEY TO BUY MORE.: PATIENT DECLINED

## 2022-11-01 ASSESSMENT — PATIENT HEALTH QUESTIONNAIRE - PHQ9
SUM OF ALL RESPONSES TO PHQ QUESTIONS 1-9: 0
SUM OF ALL RESPONSES TO PHQ9 QUESTIONS 1 & 2: 0
1. LITTLE INTEREST OR PLEASURE IN DOING THINGS: 0
2. FEELING DOWN, DEPRESSED OR HOPELESS: 0
SUM OF ALL RESPONSES TO PHQ QUESTIONS 1-9: 0

## 2022-11-01 ASSESSMENT — ENCOUNTER SYMPTOMS
ALLERGIC/IMMUNOLOGIC NEGATIVE: 1
COLOR CHANGE: 0
ABDOMINAL DISTENTION: 0
ABDOMINAL PAIN: 0
NAUSEA: 0
SINUS PRESSURE: 0
TROUBLE SWALLOWING: 0
SORE THROAT: 0
DIARRHEA: 0
RHINORRHEA: 0
VOMITING: 0
CONSTIPATION: 0
BACK PAIN: 0
BLOOD IN STOOL: 0

## 2022-11-01 ASSESSMENT — SOCIAL DETERMINANTS OF HEALTH (SDOH): HOW HARD IS IT FOR YOU TO PAY FOR THE VERY BASICS LIKE FOOD, HOUSING, MEDICAL CARE, AND HEATING?: PATIENT DECLINED

## 2022-11-01 NOTE — PROGRESS NOTES
Kimberly Alejandra presents today for follow up of Diabetes, HTN, High chol, Mild Cognitive IMpairment    Current Outpatient Medications   Medication Sig Dispense Refill    aspirin 81 MG EC tablet Take 1 tablet by mouth daily 90 tablet 0    losartan (COZAAR) 100 MG tablet Take 1 tablet by mouth daily 90 tablet 0    metFORMIN (GLUCOPHAGE) 850 MG tablet TAKE 1 TABLET(850 MG) BY MOUTH THREE TIMES DAILY WITH MEALS 270 tablet 0    simvastatin (ZOCOR) 20 MG tablet Take 1 tablet by mouth daily 90 tablet 0    diclofenac (VOLTAREN) 50 MG EC tablet Take 50 mg by mouth 2 times daily as needed for Pain      Multiple Vitamins-Minerals (THERAPEUTIC MULTIVITAMIN-MINERALS) tablet Take 1 tablet by mouth daily      Xfgmqpczr-Diklstoyx-Cznhqclelk (METAFOLBIC PLUS) 6-2-600 MG TABS Take 1 tablet by mouth daily (Patient not taking: Reported on 11/1/2022)      diclofenac sodium (VOLTAREN) 1 % GEL Apply 2 g topically 2 times daily (Patient not taking: Reported on 11/1/2022) 100 g 3     No current facility-administered medications for this visit. Past Medical History:   Diagnosis Date    Diabetes ([facility] [address] 2002    High cholesterol     Hypertension 2015    Mild cognitive impairment     DINO (obstructive sleep apnea)     Pneumonia due to COVID-19 virus 2022          Subjective:  AS above. Overall doing well. BS running under 130. No low bs sxs. Takes all meds. Did not fast for labs. Review of Systems   Constitutional:  Negative for activity change, appetite change and chills. HENT:  Negative for congestion, ear pain, mouth sores, postnasal drip, rhinorrhea, sinus pressure, sneezing, sore throat and trouble swallowing. Eyes:  Negative for visual disturbance. Cardiovascular:  Negative for chest pain, palpitations and leg swelling. Gastrointestinal:  Negative for abdominal distention, abdominal pain, blood in stool, constipation, diarrhea, nausea and vomiting.    Endocrine: Negative for cold intolerance, heat intolerance, polydipsia and polyuria. Genitourinary:  Negative for difficulty urinating, dysuria, flank pain, frequency and urgency. Musculoskeletal:  Negative for arthralgias, back pain, gait problem, neck pain and neck stiffness. Skin: Negative. Negative for color change. Allergic/Immunologic: Negative. Neurological:  Negative for dizziness, tremors, speech difficulty, weakness, light-headedness and headaches. Hematological: Negative. Psychiatric/Behavioral: Negative. Objective:  /68 (Site: Left Upper Arm, Position: Sitting, Cuff Size: Medium Adult)   Pulse 79   Temp 97.3 °F (36.3 °C)   Ht 5' 8\" (1.727 m)   Wt 189 lb (85.7 kg)   SpO2 97%   BMI 28.74 kg/m²      Physical Exam  Constitutional:       Appearance: He is obese. HENT:      Head: Normocephalic. Right Ear: Tympanic membrane and external ear normal. There is no impacted cerumen. Left Ear: Tympanic membrane and external ear normal. There is no impacted cerumen. Nose: Nose normal.      Mouth/Throat:      Pharynx: Oropharynx is clear. No oropharyngeal exudate. Eyes:      Extraocular Movements: Extraocular movements intact. Conjunctiva/sclera: Conjunctivae normal.      Pupils: Pupils are equal, round, and reactive to light. Cardiovascular:      Rate and Rhythm: Normal rate and regular rhythm. Heart sounds: No murmur heard. No friction rub. No gallop. Pulmonary:      Effort: Pulmonary effort is normal.      Breath sounds: Normal breath sounds. Abdominal:      General: Bowel sounds are normal. There is no distension. Palpations: Abdomen is soft. There is no mass. Tenderness: There is no abdominal tenderness. There is no guarding or rebound. Musculoskeletal:         General: No swelling, tenderness or deformity. Normal range of motion. Cervical back: Normal range of motion and neck supple. No tenderness. Lymphadenopathy:      Cervical: No cervical adenopathy.    Skin:     General: Skin is warm. Coloration: Skin is not pale. Findings: No rash. Neurological:      General: No focal deficit present. Mental Status: He is alert and oriented to person, place, and time. Assessment: Som Cedillo was seen today for diabetes. Diagnoses and all orders for this visit:    Type 2 diabetes mellitus without complication, without long-term current use of insulin ([facility])  Comments:  Controled, will do A1C on next visit. Reviewed Diabetic diet. Primary hypertension  Comments:  Controled on meds    Serum lipids high  Comments:  Controlled on statin, recheck on next appt. Cognitive deficits  Comments:  REminded to stay active and mentally challenged. Maintain routines. Other orders  -     aspirin 81 MG EC tablet; Take 1 tablet by mouth daily  -     losartan (COZAAR) 100 MG tablet; Take 1 tablet by mouth daily  -     metFORMIN (GLUCOPHAGE) 850 MG tablet; TAKE 1 TABLET(850 MG) BY MOUTH THREE TIMES DAILY WITH MEALS  -     simvastatin (ZOCOR) 20 MG tablet;  Take 1 tablet by mouth daily  -     Influenza, FLUAD, (age 72 y+), IM, Preservative Free, 0.5 mL

## 2022-11-07 ENCOUNTER — HOSPITAL ENCOUNTER (OUTPATIENT)
Dept: SPEECH THERAPY | Age: 72
Setting detail: THERAPIES SERIES
Discharge: HOME | End: 2022-11-07
Payer: MEDICARE

## 2022-11-07 PROCEDURE — 97129 THER IVNTJ 1ST 15 MIN: CPT

## 2022-11-07 PROCEDURE — 97130 THER IVNTJ EA ADDL 15 MIN: CPT

## 2022-11-07 NOTE — PROGRESS NOTES
Ramsey Lafleurjeanette was seen for cognitive therapy. The [facility]  application was utilized. [employer] [id number], assisted with the session. The following was targeted:    Re-assessment was initiated on 11/7/22 via the [employer].   Results from initiation evaluation will also be provided below for comparative review:    Ross Information Processing Assessment-Geriatric:    Subtest Raw Score  IE Raw Score  11/7/22 Percentile  IE Percentile   11/7/22 Standard Score   IE Standard Score 11/7/22 Severity  IE Severity   11/7/22              Immediate Memory 23 27 25 75 8 12 Severe-  moderate Moderate-  mild   Recent Memory 29 28 95 91 15 14 WFL WFL   Temporal Orientation 30 30 95 95 15 15 WFL WFL   Spatial Orientation 26 30 63 95 11 15 moderate WFL   Orientation to Environment 27 DNT 75  12  Moderate-  mild    Recall of General Information 27 DNT 75  12  Moderate-  mild    Problem Solving & Abstract Reasoning 30 DNT 95  15  WFL    Organization of Information 27 DNT 91  14  WFL    Auditory Processing & Comprehension 30 DNT 95  15  WFL    Problem Solving and [place]ality Reasoning 29 DNT 91  14  WFL    Naming Common Objects 30 DN T 91  14  WFL    Functional Oral Reading 30 DNT 91  14  WFL      Composites Sum of Standard Scores IE Sum of Standard Scores 11/7/22 Quotients  IE Quotients  11/7/22 Percentile Rank  IE Percentile Rank 11/7/22 Severity Rating  IE Severity Rating   11/7/22   Information Processing 131 incomplete 122  93  WFL    Memory 35 incomplete 111  77  mild    Orientation 38 incomplete 117  87  mild    Problem Solving 43 incomplete 128  97  WFL        VARIANT OBSERVATIONS:     Present IE Present 11/7/22 Absent IE Absent   11/7/22   Error (e) x x     Perseveration (p)   x x   Repeat Instructions/Stimulus (r) x x     Denial/Refusal (d) x x     Delayed Response (dl) x x     Confabulation (c)   x x   Partially Correct (pc) x x     Irrelevant (i)   x x Tangential (t)   x x   Self-corrected (sc) x x         Continue plan of care. Treatment plan and goals can be found in the initial evaluation/progress report. Mercedes Riley M.S., CCC-SLP/L  Speech-Language Pathologist    CPT CODE:       41501  therapeutic interventions that focus on cognitive function , initial  15 min  29793  therapeutic interventions that focus on cognitive function, each additional 15 min

## 2022-11-10 ENCOUNTER — TELEPHONE (OUTPATIENT)
Dept: ENT CLINIC | Age: 72
End: 2022-11-10

## 2022-11-10 ASSESSMENT — ENCOUNTER SYMPTOMS
COUGH: 0
SHORTNESS OF BREATH: 0
VOMITING: 0

## 2022-11-14 ENCOUNTER — HOSPITAL ENCOUNTER (OUTPATIENT)
Dept: SPEECH THERAPY | Age: 72
Setting detail: THERAPIES SERIES
Discharge: HOME | End: 2022-11-14
Payer: MEDICARE

## 2022-11-14 PROCEDURE — 97130 THER IVNTJ EA ADDL 15 MIN: CPT

## 2022-11-14 PROCEDURE — 97129 THER IVNTJ 1ST 15 MIN: CPT

## 2022-11-14 PROCEDURE — 92507 TX SP LANG VOICE COMM INDIV: CPT

## 2022-11-14 NOTE — PROGRESS NOTES
Connie Andre was seen for cognitive therapy. The [facility]  application was utilized. Kera Tan [id number], assisted with the session. The following was targeted:    Re-assessment via the [employer] was continued.   Results from initiation evaluation will also be provided below for comparative review:    Ross Information Processing Assessment-Geriatric:    Subtest Raw Score  IE Raw Score  11/7/22 Percentile  IE Percentile   11/7/22 Standard Score   IE Standard Score 11/7/22 Severity  IE Severity   11/7/22              Immediate Memory 23 27 25 75 8 12 Severe-  moderate Moderate-  mild   Recent Memory 29 28 95 91 15 14 WFL WFL   Temporal Orientation 30 30 95 95 15 15 WFL WFL   Spatial Orientation 26 30 63 95 11 15 moderate WFL   Orientation to Environment 27 29 75 91 12 14 Moderate-  mild WFL   Recall of General Information 27 28 75 84 12 13 Moderate-  mild mild   Problem Solving & Abstract Reasoning 30 30 95 95 15 15 WFL WFL   Organization of Information 27 DNT 91  14  WFL    Auditory Processing & Comprehension 30 DNT 95  15  WFL    Problem Solving and [place] Reasoning 29 DNT 91  14  WFL    Naming Common Objects 30 DN T 91  14  WFL    Functional Oral Reading 30 DNT 91  14  WFL      Composites Sum of Standard Scores IE Sum of Standard Scores 11/7/22 Quotients  IE Quotients  11/7/22 Percentile Rank  IE Percentile Rank 11/7/22 Severity Rating  IE Severity Rating   11/7/22   Information Processing 131 incomplete 122  93  WFL    Memory 35 41 111 124 77 95 mild WFL   Orientation 38 43 117 128 87 97 mild WFL   Problem Solving 43 incomplete 128  97  WFL        VARIANT OBSERVATIONS:     Present IE Present 11/7/22 Absent IE Absent   11/7/22   Error (e) x x     Perseveration (p)   x x   Repeat Instructions/Stimulus (r) x x     Denial/Refusal (d) x x     Delayed Response (dl) x x     Confabulation (c)   x x   Partially Correct (pc) x x     Irrelevant (i)   x x Tangential (t)   x x   Self-corrected (sc) x x         Continue plan of care. Treatment plan and goals can be found in the initial evaluation/progress report. Mercedes Oliveira M.S., CCC-SLP/L  Speech-Language Pathologist    CPT CODE:       74828  therapeutic interventions that focus on cognitive function , initial  15 min  71054  therapeutic interventions that focus on cognitive function, each additional 15 min

## 2022-11-21 ENCOUNTER — HOSPITAL ENCOUNTER (OUTPATIENT)
Dept: SPEECH THERAPY | Age: 72
Setting detail: THERAPIES SERIES
Discharge: HOME | End: 2022-11-21
Payer: MEDICARE

## 2022-11-21 PROCEDURE — 97129 THER IVNTJ 1ST 15 MIN: CPT

## 2022-11-21 PROCEDURE — 97130 THER IVNTJ EA ADDL 15 MIN: CPT

## 2022-11-21 NOTE — PROGRESS NOTES
Yohana Alvarado arrived 20 minutes late to his scheduled speech appointment. SLP offered to see him at 9:30. Patient was agreeable. Yohana Alvarado was seen for cognitive therapy. The hospital  application was utilized. Talha Mullen #[id number], assisted with the session. The following was targeted:    Re-assessment via the [employer] was continued.   Results from initiation evaluation will also be provided below for comparative review:    Ross Information Processing Assessment-Geriatric:    Subtest Raw Score  IE Raw Score  11/7/22 Percentile  IE Percentile   11/7/22 Standard Score   IE Standard Score 11/7/22 Severity  IE Severity   11/7/22              Immediate Memory 23 27 25 75 8 12 Severe-  moderate Moderate-  mild   Recent Memory 29 28 95 91 15 [address] [facility] WFL   Temporal Orientation 30 30 95 95 15 15 WFL WFL   Spatial Orientation 26 30 63 95 11 15 moderate WFL   Orientation to Environment 27 29 75 91 12 14 Moderate-  mild WFL   Recall of General Information 27 28 75 84 12 13 Moderate-  mild mild   Problem Solving & Abstract Reasoning 30 30 95 95 15 15 [facility]   Organization of Information 27 27 91 91 14 14 [facility]   Auditory Processing & Comprehension 30 30 95 95 15 15 [facility]   Problem Solving and [place] Reasoning 29 30 91 95 [facility]   Naming Common Objects 30 DN T 91  14  WF    Functional Oral Reading 30 DNT 91  14  WF      Composites Sum of Standard Scores IE Sum of Standard Scores 11/7/22 Quotients  IE Quotients  11/7/22 Percentile Rank  IE Percentile Rank 11/7/22 Severity Rating  IE Severity Rating   11/7/22   Information Processing 131 143 122 131 93 [facility]   Memory 35 41 111 124 77 95 mild WFL   Orientation 38 43 117 128 87 97 mild WFL   Problem Solving 43 44 128 130 97 98 WFL WFL       VARIANT OBSERVATIONS:     Present IE Present 11/7/22 Absent IE Absent   11/7/22   Error (e) x x     Perseveration (p)   x x   Repeat Instructions/Stimulus (r) x x     Denial/Refusal (d) x x     Delayed Response (dl) x x     Confabulation (c)   x x   Partially Correct (pc) x x     Irrelevant (i)   x x   Tangential (t)   x x   Self-corrected (sc) x x         Continue plan of care. Treatment plan and goals can be found in the initial evaluation/progress report. Mercedes Monsivais M.S., CCC-SLP/L  Speech-Language Pathologist    CPT CODE:       59645  therapeutic interventions that focus on cognitive function , initial  15 min  37688  therapeutic interventions that focus on cognitive function, each additional 15 min

## 2022-11-28 ENCOUNTER — HOSPITAL ENCOUNTER (OUTPATIENT)
Dept: SPEECH THERAPY | Age: 72
Setting detail: THERAPIES SERIES
Discharge: HOME | End: 2022-11-28
Payer: MEDICARE

## 2022-11-28 PROCEDURE — 97130 THER IVNTJ EA ADDL 15 MIN: CPT

## 2022-11-28 PROCEDURE — 97129 THER IVNTJ 1ST 15 MIN: CPT

## 2022-11-28 NOTE — PROGRESS NOTES
Sara Zaragoza was seen for cognitive therapy. The [facility]  application was utilized. Alfredophi Davis #[id number], assisted with the session. The following was targeted:    SLP reviewed the results of the RIPA-G with the patient. Patient verbalized understanding. SLP reviewed all strategies targeted in therapy and provided examples of carryover activities. Patient verbalized understanding. He also provided examples in which he has implemented these strategies or how he could implement them in the future. Patient requested strategies to aid in recall of names. SLP reviewed verbal rehearsal and suggested picture supports such as a home directory or a picture directory on his cell phone. Due to attainment of goals and/or a plateau in progress, the patient is being discharged from therapy at this time. RAVEN WheatS., CCC-SLP/L  Speech-Language Pathologist    CPT CODE:       75010  therapeutic interventions that focus on cognitive function , initial  15 min  96592  therapeutic interventions that focus on cognitive function, each additional 15 min

## 2022-11-28 NOTE — PROGRESS NOTES
[address] [place] [address]  Outpatient Speech Therapy  Phone: [phone]            Fax: [phone]                SPEECH-LANGUAGE PATHOLOGY  DISCHARGE SUMMARY    PATIENT NAME:  Ailyn Martinez  (male)                MRN:  [id number]                       :  1950  (66 y.o.)  STATUS:  Outpatient clinic       DATE OF DISCHARGE:  2022  REFERRING PROVIDER:    PATRICA Jesus        PROVIDER NPI:  [id number]  SPECIFIC PROVIDER ORDER: [facility]  Date of order:  22  EVALUATING THERAPIST: HOSSEIN Saldivar     CERTIFICATION/RECERTIFICATION PERIOD: 10/31/22 to 23  INSURANCE PROVIDER:  [facility] / [facility] / Product Type: *No Product type* /      INSURANCE ID:  [id number] - (Medicare Managed)              SECONDARY INSURANCE (if applicable): [place]     CPT Codes  EVALUATION:            38100  standardized cognitive performance testing (per hour)     TREATMENT:             Requesting treatment authorization for 25 total visits focusing on the following CPT codes:                                                 42360  therapeutic interventions that focus on cognitive function , initial  15 min  90854  therapeutic interventions that focus on cognitive function, each additional 15 min     Patient completed 17:25 recommended visits. REFERRING/TREATMENT DIAGNOSIS: Cognitive communication deficit [R41.841]           SPEECH THERAPY  PLAN OF CARE   The speech therapy  POC is established based on physician order, speech pathology diagnosis and results of clinical assessment     SPEECH PATHOLOGY DIAGNOSIS:      Admission:  Mild cognitive deficit    Discharge:  [facility] cognitive skills     Outpatient Speech Pathology intervention was recommended 1 time per week for the above certification period.       SHORT/LONG TERM GOALS:  Progress in therapy has been recorded using the following key:  NC= no change; IMP= improved; ACH= achieved; NEI= not enough information    1. Patient will improve immediate memory for functional tasks such as recording phone messages to a supervisory assistance level with greater than 90% accuracy and the use of compensatory aids. -IMP  2. Patient will improve recent memory including temporal/spatial orientation to a supervisory assistance level with greater than 90% accuracy with the use of a memory log/calendar aid. -ACH  3. Patient will complete the Assessment for Language Related Functional Activities-NC  4. Patient will identify and spontaneously use compensatory techniques to assist with memory with greater than 90% accuracy-ACH  5. Patient will improve problem solving for functional activities such as financial, medication, and schedule management to a supervisory assistance level with greater than 90% accuracy and the use of compensatory aids/strategies. [facility] for verbal problem solving      Rehabilitation Potential/Prognosis: good                                                                                                                                                                                                                                                     Cognitive Evaluation:  Re-assessment via the [employer] was completed.   Results from initiation evaluation will also be provided below for comparative review:    Ross Information Processing Assessment-Geriatric:    Subtest Raw Score  IE Raw Score  11/7/22 Percentile  IE Percentile   11/7/22 Standard Score   IE Standard Score 11/7/22 Severity  IE Severity   11/7/22              Immediate Memory 23 27 25 75 8 12 Severe-  moderate Moderate-  mild   Recent Memory 29 28 95 91 15 14 WFL WFL   Temporal Orientation 30 30 95 95 15 15 WFL WFL   Spatial Orientation 26 30 63 95 11 15 moderate WFL   Orientation to Environment 27 29 75 91 12 14 Moderate-  mild WFL   Recall of General Information 27 28 75 84 12 13 Moderate-  mild mild   Problem Solving & Abstract Reasoning 30 30 95 95 15 15 WFL WFL   Organization of Information 27 27 91 91 14 14 [facility]   Auditory Processing & Comprehension 30 30 95 95 15 15 WFL WFL   Problem Solving and Allen Reasoning 29 30 91 95 14 15 WFL WFL   Naming Common Objects 30 DN T 91  14  WFL    Functional Oral Reading 30 DNT 91  14  WFL      Composites Sum of Standard Scores IE Sum of Standard Scores 11/7/22 Quotients  IE Quotients  11/7/22 Percentile Rank  IE Percentile Rank 11/7/22 Severity Rating  IE Severity Rating   11/7/22   Information Processing 131 143 122 131 93 98 WFL WFL   Memory 35 41 111 124 77 95 mild WFL   Orientation 38 43 117 128 87 97 mild WFL   Problem Solving 43 44 128 130 97 98 WFL WFL       VARIANT OBSERVATIONS:     Present IE Present 11/7/22 Absent IE Absent   11/7/22   Error (e) x x     Perseveration (p)   x x   Repeat Instructions/Stimulus (r) x x     Denial/Refusal (d) x x     Delayed Response (dl) x x     Confabulation (c)   x x   Partially Correct (pc) x x     Irrelevant (i)   x x   Tangential (t)   x x   Self-corrected (sc) x x                                                                                                                                                                                                                                                                               DISCHARGE SUMMARY:  Kimberly Alejandra demonstrated improvement in response to speech therapy intervention. Due to attainment of goals and plateau in progress on others, the patient is being discharged from therapy at this time. Mercedes Abbasi M.S., TRICIA-SLP/L  Speech-Language Pathologist        [address] [facility]     Phone: [phone]

## 2023-01-17 ENCOUNTER — OFFICE VISIT (OUTPATIENT)
Dept: PRIMARY CARE CLINIC | Age: 73
End: 2023-01-17
Payer: MEDICARE

## 2023-01-17 VITALS
WEIGHT: 187 LBS | OXYGEN SATURATION: 95 % | HEART RATE: 66 BPM | SYSTOLIC BLOOD PRESSURE: 122 MMHG | TEMPERATURE: 97.5 F | DIASTOLIC BLOOD PRESSURE: 60 MMHG | BODY MASS INDEX: 28.34 KG/M2 | HEIGHT: 68 IN

## 2023-01-17 DIAGNOSIS — E78.00 HIGH CHOLESTEROL: ICD-10-CM

## 2023-01-17 DIAGNOSIS — I10 PRIMARY HYPERTENSION: Primary | ICD-10-CM

## 2023-01-17 DIAGNOSIS — R41.89 COGNITIVE DEFICITS: ICD-10-CM

## 2023-01-17 DIAGNOSIS — E11.9 TYPE 2 DIABETES MELLITUS WITHOUT COMPLICATION, WITHOUT LONG-TERM CURRENT USE OF INSULIN (HCC): ICD-10-CM

## 2023-01-17 DIAGNOSIS — I10 PRIMARY HYPERTENSION: ICD-10-CM

## 2023-01-17 LAB
ALBUMIN SERPL-MCNC: 4.1 G/DL (ref 3.5–5.2)
ALP BLD-CCNC: 63 U/L (ref 40–129)
ALT SERPL-CCNC: 10 U/L (ref 0–40)
ANION GAP SERPL CALCULATED.3IONS-SCNC: 11 MMOL/L (ref 7–16)
AST SERPL-CCNC: 17 U/L (ref 0–39)
BILIRUB SERPL-MCNC: 0.3 MG/DL (ref 0–1.2)
BUN BLDV-MCNC: 15 MG/DL (ref 6–23)
CALCIUM SERPL-MCNC: 9.4 MG/DL (ref 8.6–10.2)
CHLORIDE BLD-SCNC: 103 MMOL/L (ref 98–107)
CHOLESTEROL, TOTAL: 177 MG/DL (ref 0–199)
CO2: 24 MMOL/L (ref 22–29)
CREAT SERPL-MCNC: 0.7 MG/DL (ref 0.7–1.2)
GFR SERPL CREATININE-BSD FRML MDRD: >60 ML/MIN/1.73
GLUCOSE FASTING: 136 MG/DL (ref 74–99)
HBA1C MFR BLD: 6.6 %
HDLC SERPL-MCNC: 43 MG/DL
LDL CHOLESTEROL CALCULATED: 111 MG/DL (ref 0–99)
POTASSIUM SERPL-SCNC: 4.4 MMOL/L (ref 3.5–5)
SODIUM BLD-SCNC: 138 MMOL/L (ref 132–146)
TOTAL PROTEIN: 7.1 G/DL (ref 6.4–8.3)
TRIGL SERPL-MCNC: 115 MG/DL (ref 0–149)
VLDLC SERPL CALC-MCNC: 23 MG/DL

## 2023-01-17 PROCEDURE — G8427 DOCREV CUR MEDS BY ELIG CLIN: HCPCS | Performed by: INTERNAL MEDICINE

## 2023-01-17 PROCEDURE — 83036 HEMOGLOBIN GLYCOSYLATED A1C: CPT | Performed by: INTERNAL MEDICINE

## 2023-01-17 PROCEDURE — 2022F DILAT RTA XM EVC RTNOPTHY: CPT | Performed by: INTERNAL MEDICINE

## 2023-01-17 PROCEDURE — 1036F TOBACCO NON-USER: CPT | Performed by: INTERNAL MEDICINE

## 2023-01-17 PROCEDURE — 99214 OFFICE O/P EST MOD 30 MIN: CPT | Performed by: INTERNAL MEDICINE

## 2023-01-17 PROCEDURE — 3017F COLORECTAL CA SCREEN DOC REV: CPT | Performed by: INTERNAL MEDICINE

## 2023-01-17 PROCEDURE — 1123F ACP DISCUSS/DSCN MKR DOCD: CPT | Performed by: INTERNAL MEDICINE

## 2023-01-17 PROCEDURE — 3078F DIAST BP <80 MM HG: CPT | Performed by: INTERNAL MEDICINE

## 2023-01-17 PROCEDURE — 3046F HEMOGLOBIN A1C LEVEL >9.0%: CPT | Performed by: INTERNAL MEDICINE

## 2023-01-17 PROCEDURE — 3074F SYST BP LT 130 MM HG: CPT | Performed by: INTERNAL MEDICINE

## 2023-01-17 PROCEDURE — G8484 FLU IMMUNIZE NO ADMIN: HCPCS | Performed by: INTERNAL MEDICINE

## 2023-01-17 PROCEDURE — G8417 CALC BMI ABV UP PARAM F/U: HCPCS | Performed by: INTERNAL MEDICINE

## 2023-01-17 RX ORDER — ASPIRIN 81 MG/1
81 TABLET ORAL DAILY
Qty: 90 TABLET | Refills: 0 | Status: SHIPPED | OUTPATIENT
Start: 2023-01-17

## 2023-01-17 RX ORDER — BLOOD SUGAR DIAGNOSTIC
100 STRIP MISCELLANEOUS 3 TIMES DAILY
COMMUNITY
Start: 2022-11-05 | End: 2023-01-17 | Stop reason: SDUPTHER

## 2023-01-17 RX ORDER — BLOOD SUGAR DIAGNOSTIC
100 STRIP MISCELLANEOUS 3 TIMES DAILY
Qty: 100 EACH | Refills: 2 | Status: SHIPPED | OUTPATIENT
Start: 2023-01-17

## 2023-01-17 RX ORDER — LOSARTAN POTASSIUM 100 MG/1
100 TABLET ORAL DAILY
Qty: 90 TABLET | Refills: 0 | Status: SHIPPED | OUTPATIENT
Start: 2023-01-17

## 2023-01-17 ASSESSMENT — PATIENT HEALTH QUESTIONNAIRE - PHQ9
SUM OF ALL RESPONSES TO PHQ QUESTIONS 1-9: 0
1. LITTLE INTEREST OR PLEASURE IN DOING THINGS: 0
SUM OF ALL RESPONSES TO PHQ QUESTIONS 1-9: 0
SUM OF ALL RESPONSES TO PHQ QUESTIONS 1-9: 0
2. FEELING DOWN, DEPRESSED OR HOPELESS: 0
SUM OF ALL RESPONSES TO PHQ QUESTIONS 1-9: 0
SUM OF ALL RESPONSES TO PHQ9 QUESTIONS 1 & 2: 0

## 2023-01-17 ASSESSMENT — ENCOUNTER SYMPTOMS
ABDOMINAL DISTENTION: 0
BACK PAIN: 0
SORE THROAT: 0
RHINORRHEA: 0
NAUSEA: 0
TROUBLE SWALLOWING: 0
BLOOD IN STOOL: 0
CONSTIPATION: 0
ABDOMINAL PAIN: 0
ALLERGIC/IMMUNOLOGIC NEGATIVE: 1
SINUS PRESSURE: 0
DIARRHEA: 0
COLOR CHANGE: 0
VOMITING: 0

## 2023-01-17 NOTE — PROGRESS NOTES
Nasrin Burris presents today for follow up of Diabetes, HTN, Diabetes and Mild Cognitive Impairment    Current Outpatient Medications   Medication Sig Dispense Refill    ACCU-CHEK GUIDE strip 100 each by Does not apply route 3 times daily 100 each 2    aspirin 81 MG EC tablet Take 1 tablet by mouth daily 90 tablet 0    diclofenac (VOLTAREN) 50 MG EC tablet Take 1 tablet by mouth 2 times daily as needed for Pain 180 tablet 0    losartan (COZAAR) 100 MG tablet Take 1 tablet by mouth daily 90 tablet 0    metFORMIN (GLUCOPHAGE) 850 MG tablet TAKE 1 TABLET(850 MG) BY MOUTH THREE TIMES DAILY WITH MEALS 270 tablet 0    Multiple Vitamins-Minerals (THERAPEUTIC MULTIVITAMIN-MINERALS) tablet Take 1 tablet by mouth daily      simvastatin (ZOCOR) 20 MG tablet Take 1 tablet by mouth daily (Patient not taking: Reported on 1/17/2023) 90 tablet 0    Oqhwkpbwy-Tmtvrbfue-Vurdjotspc (METAFOLBIC PLUS) 6-2-600 MG TABS Take 1 tablet by mouth daily (Patient not taking: No sig reported)      diclofenac sodium (VOLTAREN) 1 % GEL Apply 2 g topically 2 times daily (Patient not taking: No sig reported) 100 g 3     No current facility-administered medications for this visit. Past Medical History:   Diagnosis Date    Diabetes ([facility] [address] 2002    High cholesterol     Hypertension 2015    Mild cognitive impairment     DINO (obstructive sleep apnea)     Pneumonia due to COVID-19 virus 2022          Subjective:  AS above. Taking all meds. Watches diet. Checks bs q am , in the low Hundreds. No visual difficulty, no burning of feet. Sees the podiatrist regularly. Just had Diabetic Eye exam, negative for Diabetic Retinopathy. Diabetes  Pertinent negatives for hypoglycemia include no dizziness, headaches, speech difficulty or tremors. Pertinent negatives for diabetes include no chest pain, no polydipsia, no polyuria and no weakness. Review of Systems   Constitutional:  Negative for activity change, appetite change and chills.    HENT: Negative for congestion, ear pain, mouth sores, postnasal drip, rhinorrhea, sinus pressure, sneezing, sore throat and trouble swallowing. Eyes:  Negative for visual disturbance. Cardiovascular:  Negative for chest pain, palpitations and leg swelling. Gastrointestinal:  Negative for abdominal distention, abdominal pain, blood in stool, constipation, diarrhea, nausea and vomiting. Endocrine: Negative for cold intolerance, heat intolerance, polydipsia and polyuria. Genitourinary:  Negative for difficulty urinating, dysuria, flank pain, frequency and urgency. Musculoskeletal:  Negative for arthralgias, back pain, gait problem, neck pain and neck stiffness. Skin: Negative. Negative for color change. Allergic/Immunologic: Negative. Neurological:  Negative for dizziness, tremors, speech difficulty, weakness, light-headedness and headaches. Forgetful   Hematological: Negative. Psychiatric/Behavioral: Negative. Objective:  /60 (Site: Left Upper Arm, Position: Sitting, Cuff Size: Medium Adult)   Pulse 66   Temp 97.5 °F (36.4 °C)   Ht 5' 8\" (1.727 m)   Wt 187 lb (84.8 kg)   SpO2 95%   BMI 28.43 kg/m²      Physical Exam  Vitals reviewed. HENT:      Head: Normocephalic. Right Ear: Tympanic membrane and external ear normal. There is no impacted cerumen. Left Ear: Tympanic membrane and external ear normal. There is no impacted cerumen. Nose: Nose normal.      Mouth/Throat:      Pharynx: Oropharynx is clear. No oropharyngeal exudate. Eyes:      Extraocular Movements: Extraocular movements intact. Conjunctiva/sclera: Conjunctivae normal.      Pupils: Pupils are equal, round, and reactive to light. Cardiovascular:      Rate and Rhythm: Normal rate and regular rhythm. Heart sounds: Murmur: norbert lsb. No friction rub. No gallop. Pulmonary:      Effort: Pulmonary effort is normal.      Breath sounds: Normal breath sounds.    Abdominal:      General: Bowel sounds are normal. There is no distension. Palpations: Abdomen is soft. There is no mass. Tenderness: There is no abdominal tenderness. There is no guarding or rebound. Musculoskeletal:         General: No swelling, tenderness or deformity. Normal range of motion. Cervical back: Normal range of motion and neck supple. No tenderness. Lymphadenopathy:      Cervical: No cervical adenopathy. Skin:     General: Skin is warm. Coloration: Skin is not pale. Findings: No rash. Neurological:      General: No focal deficit present. Mental Status: He is alert and oriented to person, place, and time. Assessment: Zac Morrell was seen today for diabetes. Diagnoses and all orders for this visit:    Primary hypertension  Comments:  Controlled on med  Orders:  -     Comprehensive Metabolic Panel, Fasting; Future  -     LIPID PANEL; Future    High cholesterol  Comments:  Controlled on statin  Orders:  -     Comprehensive Metabolic Panel, Fasting; Future  -     LIPID PANEL; Future    Type 2 diabetes mellitus without complication, without long-term current use of insulin (HCC)  Comments:  Checked A1C, CMP and urine for albumin. Reminded of no sugar low carb diet  Orders:  -     POCT glycosylated hemoglobin (Hb A1C)    Cognitive deficits  Comments:  Mild Cognitive Impairment, On Cerefolin, stable    Other orders  -     ACCU-CHEK GUIDE strip; 100 each by Does not apply route 3 times daily  -     aspirin 81 MG EC tablet; Take 1 tablet by mouth daily  -     diclofenac (VOLTAREN) 50 MG EC tablet; Take 1 tablet by mouth 2 times daily as needed for Pain  -     losartan (COZAAR) 100 MG tablet;  Take 1 tablet by mouth daily  -     metFORMIN (GLUCOPHAGE) 850 MG tablet; TAKE 1 TABLET(850 MG) BY MOUTH THREE TIMES DAILY WITH MEALS

## 2023-01-30 ENCOUNTER — COMMUNITY OUTREACH (OUTPATIENT)
Dept: PRIMARY CARE CLINIC | Age: 73
End: 2023-01-30

## 2023-01-31 ENCOUNTER — TELEPHONE (OUTPATIENT)
Dept: PRIMARY CARE CLINIC | Age: 73
End: 2023-01-31

## 2023-01-31 NOTE — TELEPHONE ENCOUNTER
Patient requesting someone give him a call at [phone] to go over his lab results with him. Thank you.

## 2023-02-09 ENCOUNTER — TELEPHONE (OUTPATIENT)
Dept: ENT CLINIC | Age: 73
End: 2023-02-09

## 2023-02-09 NOTE — TELEPHONE ENCOUNTER
Prior Authorization Form:      DEMOGRAPHICS:                     Patient Name:  Brunetta Cowden  Patient :  1950            Insurance:  Payor: Paula Galvez / Plan: [facility] HMO / Product Type: *No Product type* /   Insurance ID Number:    Payer/Plan Subscr  Sex Relation Sub. Ins. ID Effective Group Num   1. [id number] Gamez MIRLANDE,R*  Male Self [id number]                                    [address] [address]   2.  [place] -* KEITH REAL,W* 1950 Male Self [id number] 10/1/22                                    [address]         DIAGNOSIS & PROCEDURE:                       Procedure/Operation: HYPOGLOSSOPHARYNGEAL NERVE STIMULATOR           CPT Code: 45061    Diagnosis:  OBSTRUCTIVE SLEEP APNEA ON CPAP    ICD10 Code: G47.33    [facility]  [facility]    Surgeon:  Yumi Kirkpatrick INFORMATION:                          Date: 23    Time: N/A              Anesthesia:  General                                                       Status:  Outpatient        Special Comments:  N/A       Electronically signed by Kamila Monroe MA on 2023 at 9:43 AM

## 2023-04-03 ENCOUNTER — TELEPHONE (OUTPATIENT)
Dept: ENT CLINIC | Age: 73
End: 2023-04-03

## 2023-04-03 NOTE — PROGRESS NOTES
Spoke with Roger at Dr. Geremias Srivastava office discussed patient scheduled for surgery on 4/6 patient states did not receive any pre op instructions regarding aspirin 81 mg which he takes daily . Patient states he stopped taking it on his own 5-7 days ago (3/27-3/29) not sure which date actually stopped. Does patient need medical clearance ? Patient states has not scheduled an appointment for that with his PCP. Rich Lees states she will inform Aniyah Ibarra of the a the above and Aniyah Ibarra will call patient tomorrow regarding all of the above. Discussed patient needs . Patient informed of the above this was done with  Claude Boor  ID [id number]. Patient verbalized understanding of the above.

## 2023-04-03 NOTE — PROGRESS NOTES
[address]   PRE-ADMISSION TESTING GENERAL INSTRUCTIONS  [facility] Phone Number: [phone]      GENERAL INSTRUCTIONS:    [x] Antibacterial Soap Shower Night before and/or AM of surgery. [x] Do not wear makeup, lotions, powders, deodorant. [x] Nothing by mouth after midnight; including gum, candy, mints, or water. [x] You may brush your teeth, gargle, but do NOT swallow water. [x] No tobacco products, illegal drugs, marijuana or alcohol within 24 hours of your surgery. [x] Jewelry or valuables should not be brought to the hospital. All body and/or tongue piercing's must be removed prior to arriving to hospital. No contact lens or hair pins. [x] Arrange transportation with a responsible adult  to and from the hospital. Arrange for someone to be with you for the remainder of the day and for 24 hours after your procedure due to having had anesthesia. -Who will be your  for transportation? Kaur King, daughter        -Who will be staying with you for 24 hrs after your procedure? Wife, Henri Crook and son    PARKING INSTRUCTIONS:     [x] ARRIVAL DATE  4/6 & TIME   9:30 am:   [x]Surgery time may change, if it does we will call you the business day before your scheduled surgery. Call will be made through the [address] [place] [address], [address]. If does not answer will leave a message. [x] Enter into the [employer]. Two adults may accompany you. [x] Parking lot '\"I\"  is located on [facility] (the corner of [facility]). To enter the lot,you will need to get a parking ticket at the gate . To exit you will be given a free parking voucher. You will need both the ticket and parking voucher to exit the parking lot. One car per patient is allowed to park in this lot. Walk up the front walk to the [facility], the door will be locked an employee will greet you and let you in.                    EDUCATION INSTRUCTIONS:        [x] Fluoroscopy-Xray used in

## 2023-04-03 NOTE — TELEPHONE ENCOUNTER
[facility] pre[facility], clement states pt did not get any information on getting a clearance for surgery, no clearance for pt has been submitted. Pt also states he was not told when to stop Aspirin. Per [facility] pt stopped taking Aspirin 5-7 days ago, between 3/27- 3/29. Pt needs to discuss clearance and have it done per [facility] before 4/6. [facility] states on her end she see's that the pt needs to have a clearance for pt to have surgery. Unsure why the pt is stating he did not need to get one. I advised [facility] that I will end Jia Tanzanian a encounter and she will call pt tomorrow to let him know what he needs to do to ensure he has surgery on 4/6. [facility] understood.

## 2023-04-04 NOTE — TELEPHONE ENCOUNTER
Called and spoke with Fany Chavez with PAT advising surgical clearance was sent to PCP and was returned/scanned into media.  Patient is cleared and good to proceed with sx as long as he held his aspirin for 5-7 days

## 2023-04-05 ENCOUNTER — ANESTHESIA EVENT (OUTPATIENT)
Dept: OPERATING ROOM | Age: 73
End: 2023-04-05
Payer: MEDICARE

## 2023-04-06 ENCOUNTER — HOSPITAL ENCOUNTER (OUTPATIENT)
Age: 73
Setting detail: OUTPATIENT SURGERY
Discharge: HOME | End: 2023-04-06
Attending: OTOLARYNGOLOGY | Admitting: OTOLARYNGOLOGY
Payer: MEDICARE

## 2023-04-06 ENCOUNTER — ANESTHESIA (OUTPATIENT)
Dept: OPERATING ROOM | Age: 73
End: 2023-04-06
Payer: MEDICARE

## 2023-04-06 ENCOUNTER — APPOINTMENT (OUTPATIENT)
Dept: GENERAL RADIOLOGY | Age: 73
End: 2023-04-06
Attending: OTOLARYNGOLOGY
Payer: MEDICARE

## 2023-04-06 VITALS
HEART RATE: 70 BPM | OXYGEN SATURATION: 98 % | TEMPERATURE: 98 F | BODY MASS INDEX: 28.19 KG/M2 | SYSTOLIC BLOOD PRESSURE: 187 MMHG | HEIGHT: 68 IN | RESPIRATION RATE: 17 BRPM | DIASTOLIC BLOOD PRESSURE: 89 MMHG | WEIGHT: 186 LBS

## 2023-04-06 DIAGNOSIS — Z01.812 PRE-OPERATIVE LABORATORY EXAMINATION: Primary | ICD-10-CM

## 2023-04-06 DIAGNOSIS — G89.18 POST-OP PAIN: ICD-10-CM

## 2023-04-06 LAB
ANION GAP SERPL CALCULATED.3IONS-SCNC: 11 MMOL/L (ref 7–16)
BUN SERPL-MCNC: 20 MG/DL (ref 6–23)
CALCIUM SERPL-MCNC: 9.6 MG/DL (ref 8.6–10.2)
CHLORIDE SERPL-SCNC: 101 MMOL/L (ref 98–107)
CO2 SERPL-SCNC: 28 MMOL/L (ref 22–29)
CREAT SERPL-MCNC: 0.9 MG/DL (ref 0.7–1.2)
ERYTHROCYTE [DISTWIDTH] IN BLOOD BY AUTOMATED COUNT: 11.8 FL (ref 11.5–15)
GLUCOSE SERPL-MCNC: 180 MG/DL (ref 74–99)
HCT VFR BLD AUTO: 40.1 % (ref 37–54)
HGB BLD-MCNC: 13.6 G/DL (ref 12.5–16.5)
MCH RBC QN AUTO: 31.1 PG (ref 26–35)
MCHC RBC AUTO-ENTMCNC: 33.9 % (ref 32–34.5)
MCV RBC AUTO: 91.6 FL (ref 80–99.9)
METER GLUCOSE: 129 MG/DL (ref 74–99)
PLATELET # BLD AUTO: 276 E9/L (ref 130–450)
PMV BLD AUTO: 9.8 FL (ref 7–12)
POTASSIUM SERPL-SCNC: 4.6 MMOL/L (ref 3.5–5)
RBC # BLD AUTO: 4.38 E12/L (ref 3.8–5.8)
SODIUM SERPL-SCNC: 140 MMOL/L (ref 132–146)
WBC # BLD: 7.3 E9/L (ref 4.5–11.5)

## 2023-04-06 PROCEDURE — 80048 BASIC METABOLIC PNL TOTAL CA: CPT

## 2023-04-06 PROCEDURE — 6370000000 HC RX 637 (ALT 250 FOR IP): Performed by: STUDENT IN AN ORGANIZED HEALTH CARE EDUCATION/TRAINING PROGRAM

## 2023-04-06 PROCEDURE — 6360000002 HC RX W HCPCS

## 2023-04-06 PROCEDURE — 2500000003 HC RX 250 WO HCPCS: Performed by: ANESTHESIOLOGY

## 2023-04-06 PROCEDURE — 2580000003 HC RX 258

## 2023-04-06 PROCEDURE — 2500000003 HC RX 250 WO HCPCS

## 2023-04-06 PROCEDURE — 36415 COLL VENOUS BLD VENIPUNCTURE: CPT

## 2023-04-06 PROCEDURE — 2500000003 HC RX 250 WO HCPCS: Performed by: OTOLARYNGOLOGY

## 2023-04-06 PROCEDURE — 6360000002 HC RX W HCPCS: Performed by: STUDENT IN AN ORGANIZED HEALTH CARE EDUCATION/TRAINING PROGRAM

## 2023-04-06 PROCEDURE — 82962 GLUCOSE BLOOD TEST: CPT

## 2023-04-06 PROCEDURE — 85027 COMPLETE CBC AUTOMATED: CPT

## 2023-04-06 PROCEDURE — 2580000003 HC RX 258: Performed by: STUDENT IN AN ORGANIZED HEALTH CARE EDUCATION/TRAINING PROGRAM

## 2023-04-06 PROCEDURE — 71046 X-RAY EXAM CHEST 2 VIEWS: CPT

## 2023-04-06 RX ORDER — HYDROCODONE BITARTRATE AND ACETAMINOPHEN 5; 325 MG/1; MG/1
1 TABLET ORAL
Status: COMPLETED | OUTPATIENT
Start: 2023-04-06 | End: 2023-04-06

## 2023-04-06 RX ORDER — SODIUM CHLORIDE 0.9 % (FLUSH) 0.9 %
5-40 SYRINGE (ML) INJECTION EVERY 12 HOURS SCHEDULED
Status: DISCONTINUED | OUTPATIENT
Start: 2023-04-06 | End: 2023-04-06 | Stop reason: HOSPADM

## 2023-04-06 RX ORDER — LABETALOL HYDROCHLORIDE 5 MG/ML
5 INJECTION, SOLUTION INTRAVENOUS EVERY 5 MIN PRN
Status: DISCONTINUED | OUTPATIENT
Start: 2023-04-06 | End: 2023-04-06 | Stop reason: HOSPADM

## 2023-04-06 RX ORDER — MEPERIDINE HYDROCHLORIDE 25 MG/ML
12.5 INJECTION INTRAMUSCULAR; INTRAVENOUS; SUBCUTANEOUS
Status: DISCONTINUED | OUTPATIENT
Start: 2023-04-06 | End: 2023-04-06 | Stop reason: HOSPADM

## 2023-04-06 RX ORDER — FENTANYL CITRATE 50 UG/ML
INJECTION, SOLUTION INTRAMUSCULAR; INTRAVENOUS PRN
Status: DISCONTINUED | OUTPATIENT
Start: 2023-04-06 | End: 2023-04-06 | Stop reason: SDUPTHER

## 2023-04-06 RX ORDER — ONDANSETRON 4 MG/1
4 TABLET, FILM COATED ORAL 3 TIMES DAILY PRN
Qty: 15 TABLET | Refills: 0 | Status: SHIPPED | OUTPATIENT
Start: 2023-04-06

## 2023-04-06 RX ORDER — ONDANSETRON 2 MG/ML
4 INJECTION INTRAMUSCULAR; INTRAVENOUS
Status: DISCONTINUED | OUTPATIENT
Start: 2023-04-06 | End: 2023-04-06 | Stop reason: HOSPADM

## 2023-04-06 RX ORDER — METHYLPREDNISOLONE 4 MG/1
TABLET ORAL
Qty: 1 KIT | Refills: 0 | Status: SHIPPED | OUTPATIENT
Start: 2023-04-06 | End: 2023-04-12

## 2023-04-06 RX ORDER — SODIUM CHLORIDE 9 MG/ML
INJECTION, SOLUTION INTRAVENOUS CONTINUOUS PRN
Status: DISCONTINUED | OUTPATIENT
Start: 2023-04-06 | End: 2023-04-06 | Stop reason: SDUPTHER

## 2023-04-06 RX ORDER — SODIUM CHLORIDE 9 MG/ML
INJECTION, SOLUTION INTRAVENOUS PRN
Status: DISCONTINUED | OUTPATIENT
Start: 2023-04-06 | End: 2023-04-06 | Stop reason: HOSPADM

## 2023-04-06 RX ORDER — SODIUM CHLORIDE 0.9 % (FLUSH) 0.9 %
5-40 SYRINGE (ML) INJECTION PRN
Status: DISCONTINUED | OUTPATIENT
Start: 2023-04-06 | End: 2023-04-06 | Stop reason: HOSPADM

## 2023-04-06 RX ORDER — SUCCINYLCHOLINE/SOD CL,ISO/PF 200MG/10ML
SYRINGE (ML) INTRAVENOUS PRN
Status: DISCONTINUED | OUTPATIENT
Start: 2023-04-06 | End: 2023-04-06 | Stop reason: SDUPTHER

## 2023-04-06 RX ORDER — ONDANSETRON 2 MG/ML
INJECTION INTRAMUSCULAR; INTRAVENOUS PRN
Status: DISCONTINUED | OUTPATIENT
Start: 2023-04-06 | End: 2023-04-06 | Stop reason: SDUPTHER

## 2023-04-06 RX ORDER — LIDOCAINE HYDROCHLORIDE 20 MG/ML
INJECTION, SOLUTION INTRAVENOUS PRN
Status: DISCONTINUED | OUTPATIENT
Start: 2023-04-06 | End: 2023-04-06 | Stop reason: SDUPTHER

## 2023-04-06 RX ORDER — HYDROCODONE BITARTRATE AND ACETAMINOPHEN 5; 325 MG/1; MG/1
TABLET ORAL
Status: DISCONTINUED
Start: 2023-04-06 | End: 2023-04-06 | Stop reason: HOSPADM

## 2023-04-06 RX ORDER — DEXAMETHASONE SODIUM PHOSPHATE 10 MG/ML
INJECTION INTRAMUSCULAR; INTRAVENOUS PRN
Status: DISCONTINUED | OUTPATIENT
Start: 2023-04-06 | End: 2023-04-06 | Stop reason: SDUPTHER

## 2023-04-06 RX ORDER — CEPHALEXIN 500 MG/1
500 CAPSULE ORAL 3 TIMES DAILY
Qty: 21 CAPSULE | Refills: 0 | Status: SHIPPED | OUTPATIENT
Start: 2023-04-06 | End: 2023-04-13

## 2023-04-06 RX ORDER — LIDOCAINE HYDROCHLORIDE AND EPINEPHRINE 10; 10 MG/ML; UG/ML
INJECTION, SOLUTION INFILTRATION; PERINEURAL PRN
Status: DISCONTINUED | OUTPATIENT
Start: 2023-04-06 | End: 2023-04-06 | Stop reason: ALTCHOICE

## 2023-04-06 RX ORDER — PROPOFOL 10 MG/ML
INJECTION, EMULSION INTRAVENOUS PRN
Status: DISCONTINUED | OUTPATIENT
Start: 2023-04-06 | End: 2023-04-06 | Stop reason: SDUPTHER

## 2023-04-06 RX ORDER — HYDROCODONE BITARTRATE AND ACETAMINOPHEN 5; 325 MG/1; MG/1
1 TABLET ORAL EVERY 6 HOURS PRN
Qty: 20 TABLET | Refills: 0 | Status: SHIPPED | OUTPATIENT
Start: 2023-04-06 | End: 2023-04-11

## 2023-04-06 RX ADMIN — Medication 120 MG: at 11:18

## 2023-04-06 RX ADMIN — SODIUM CHLORIDE: 9 INJECTION, SOLUTION INTRAVENOUS at 11:09

## 2023-04-06 RX ADMIN — DEXAMETHASONE SODIUM PHOSPHATE 10 MG: 10 INJECTION INTRAMUSCULAR; INTRAVENOUS at 11:22

## 2023-04-06 RX ADMIN — ONDANSETRON 4 MG: 2 INJECTION INTRAMUSCULAR; INTRAVENOUS at 14:08

## 2023-04-06 RX ADMIN — LIDOCAINE HYDROCHLORIDE 100 MG: 20 INJECTION, SOLUTION INTRAVENOUS at 11:18

## 2023-04-06 RX ADMIN — PROPOFOL 150 MG: 10 INJECTION, EMULSION INTRAVENOUS at 11:18

## 2023-04-06 RX ADMIN — CEFAZOLIN 2000 MG: 2 INJECTION, POWDER, FOR SOLUTION INTRAMUSCULAR; INTRAVENOUS at 11:24

## 2023-04-06 RX ADMIN — HYDROMORPHONE HYDROCHLORIDE 0.5 MG: 1 INJECTION, SOLUTION INTRAMUSCULAR; INTRAVENOUS; SUBCUTANEOUS at 15:28

## 2023-04-06 RX ADMIN — SODIUM CHLORIDE: 9 INJECTION, SOLUTION INTRAVENOUS at 09:15

## 2023-04-06 RX ADMIN — LABETALOL HYDROCHLORIDE 5 MG: 5 INJECTION INTRAVENOUS at 15:16

## 2023-04-06 RX ADMIN — PROPOFOL 75 MCG/KG/MIN: 10 INJECTION, EMULSION INTRAVENOUS at 11:20

## 2023-04-06 RX ADMIN — FENTANYL CITRATE 50 MCG: 0.05 INJECTION, SOLUTION INTRAMUSCULAR; INTRAVENOUS at 12:22

## 2023-04-06 RX ADMIN — FENTANYL CITRATE 100 MCG: 0.05 INJECTION, SOLUTION INTRAMUSCULAR; INTRAVENOUS at 11:18

## 2023-04-06 RX ADMIN — HYDROCODONE BITARTRATE AND ACETAMINOPHEN 1 TABLET: 5; 325 TABLET ORAL at 16:37

## 2023-04-06 ASSESSMENT — PAIN DESCRIPTION - DESCRIPTORS
DESCRIPTORS: ACHING;BURNING;CRAMPING
DESCRIPTORS: BURNING;ACHING;CRAMPING

## 2023-04-06 ASSESSMENT — PAIN DESCRIPTION - ORIENTATION
ORIENTATION: RIGHT

## 2023-04-06 ASSESSMENT — PAIN SCALES - GENERAL
PAINLEVEL_OUTOF10: 3
PAINLEVEL_OUTOF10: 7
PAINLEVEL_OUTOF10: 0
PAINLEVEL_OUTOF10: 0
PAINLEVEL_OUTOF10: 4
PAINLEVEL_OUTOF10: 0
PAINLEVEL_OUTOF10: 7

## 2023-04-06 ASSESSMENT — PAIN DESCRIPTION - LOCATION
LOCATION: NECK

## 2023-04-06 ASSESSMENT — PAIN DESCRIPTION - FREQUENCY
FREQUENCY: INTERMITTENT

## 2023-04-06 ASSESSMENT — PAIN - FUNCTIONAL ASSESSMENT: PAIN_FUNCTIONAL_ASSESSMENT: 0-10

## 2023-04-06 ASSESSMENT — PAIN DESCRIPTION - PAIN TYPE
TYPE: SURGICAL PAIN

## 2023-04-06 ASSESSMENT — PAIN DESCRIPTION - ONSET
ONSET: ON-GOING
ONSET: ON-GOING

## 2023-04-06 NOTE — ANESTHESIA POSTPROCEDURE EVALUATION
Department of Anesthesiology  Postprocedure Note    Patient: Mustapha Jimenez  MRN: [id number]  YOB: 1950  Date of evaluation: 4/6/2023      Procedure Summary     Date: 04/06/23 Room / Location: [address] [place] [address] 20 / [facility]    Anesthesia Start: 1109 Anesthesia Stop: 4924    Procedure: [address] (Right: Chest) Diagnosis:       DINO on CPAP      (DINO on CPAP [G47.33, Z99.89])    Surgeons: Narcisa Romero DO Responsible Provider: Andrew Pinon DO    Anesthesia Type: general ASA Status: 2          Anesthesia Type: No value filed.     Haven Phase I: Haven Score: 10    Haven Phase II:        Anesthesia Post Evaluation    Patient location during evaluation: PACU  Patient participation: complete - patient participated  Level of consciousness: awake and alert  Airway patency: patent  Nausea & Vomiting: no nausea and no vomiting  Complications: no  Cardiovascular status: blood pressure returned to baseline  Respiratory status: acceptable  Hydration status: euvolemic  Multimodal analgesia pain management approach

## 2023-04-06 NOTE — PROGRESS NOTES
CLINICAL PHARMACY NOTE: MEDS TO BEDS    Total # of Prescriptions Filled: 3   The following medications were delivered to the patient:  Ondansetron 4 mg  Cephalexin 500 mg  Hydrocodone-apap 5-325    Additional Documentation:   Daughter picked up at register

## 2023-04-06 NOTE — DISCHARGE INSTRUCTIONS
Otolaryngology (ENT) Post-Op Instructions    Follow-up with Dr. Carli Rodney in 1 week(s). Please call office to schedule and confirm your appointment. Please follow the instructions below:    DIET INSTRUCTIONS:  Regular diet. ACTIVITY INSTRUCTIONS:  Increase activity as tolerated    Elevate Head of bed   No heavy lifting or strenuous activity     No driving while taking pain medication      WOUND/DRESSING INSTRUCTIONS:  May shower in 48 hours, but avoid baths, swimming pools or hot tubes until your wound is healed   Steri strips to be removed in office     MEDICATION INSTRUCTIONS:  Take medication as prescribed. When taking pain medications, you may experience dizziness or drowsiness. Do not drink alcohol or drive when taking these medications. You may take Ibuprofen (over the counter) as per directions for mild pain. Do not take any other acetaminophen (Tylenol) products while taking your pain medication. You may experience constipation while taking pain medication - You may take over the counter stool softeners: docuscate (Colace) or sennosides S (Senokot - S).      Call physician for any of the following or for questions/concerns:   Fever over 101° F    Redness, swelling, hardness or warmth at the wound site (s)  Unrelieved nausea/vomiting    Foul smelling or cloudy drainage at the wound site (s)   Unrelieved pain or increase in pain     Increase in shortness of breath

## 2023-04-06 NOTE — ANESTHESIA PRE PROCEDURE
exam  breath sounds clear to auscultation  (+) sleep apnea:                             Cardiovascular:    (+) hypertension:,         Rhythm: regular  Rate: normal                    Neuro/Psych:   (+) CVA:, TIA,             GI/Hepatic/Renal: Neg GI/Hepatic/Renal ROS            Endo/Other:    (+) DiabetesType II DM, , .                 Abdominal:             Vascular: negative vascular ROS. Other Findings:             Anesthesia Plan      general     ASA 2       Induction: intravenous. MIPS: Postoperative opioids intended, Prophylactic antiemetics administered and Postoperative trial extubation. Anesthetic plan and risks discussed with patient (interpretor Simone Villar). Plan discussed with CRNA.                     Noy Jain DO   4/6/2023

## 2023-04-06 NOTE — H&P
Linda Latham was seen and re-examined preoperatively today, April 6, 2023. There was no substantial change in his physical and medical status. Patient is fit for the proposed surgical procedure. All questions were appropriately addressed and had no further questions regarding the risks, benefits, and alternatives of the procedure. Linda Latham and family wished to proceed.     Mara Wright DO  Resident Physician  [facility]  Otolaryngology Residency  4/6/2023  10:23 AM
Effort: Pulmonary effort is normal. No respiratory distress. Musculoskeletal:         General: Normal range of motion. Cervical back: Normal range of motion. Lymphadenopathy:      Cervical: No cervical adenopathy. Skin:     General: Skin is warm. Findings: No erythema. Neurological:      Mental Status: He is alert. Cranial Nerves: No cranial nerve deficit. IMPRESSION/PLN:     Is seen and examined for history of hypoglossal nerve stimulator implantation evaluation with a known history of an AHI index of  , And a BMI of 22. Recommendations are for the patient to continue current medical therapies, follow-up with a drug-induced sleep endoscopy to ensure adequate anterior posterior versus concentric collapse and his candidacy for hypoglossal nerve stimulation. Risk and benefits of 8 drug-induced endoscopically were reviewed, and a brief introduction into the process of actual hypoglossal nerve stimulator implantation was reviewed. Publications were provided both in [place] and in [place] Dr. Joey Crocker Otolaryngology/Facial Plastic Surgery Residency  Associate Clinical Professor:  Sharyon Castleman, [facility]

## 2023-04-06 NOTE — OP NOTE
Operative Note      Patient: Jud Gallagher  YOB: 1950  MRN: [id number]    Date of Procedure: 4/6/2023    Pre-Op Diagnosis: DINO on CPAP [G47.33, Z99.89]    Post-Op Diagnosis: Same       Procedure(s): HYPOGLOSSOPHARYGEAL NERVE STIMULATOR INSERTION    Surgeon(s):  Robyn Parham DO    Assistant:   First Assistant: James Xiao  Resident: Sean Robledo DO; Thanh Tenorio DO    Anesthesia: General    Estimated Blood Loss (mL): less than 50     Complications: None    Specimens:   * No specimens in log *    Implants:  Implant Name Type Inv. Item Serial No.  Lot No. LRB No. Used Action   LEAD SENSING [employer]SPIRE - [id number] Implantable long term continuous electrocardiography EKG system [employer] [id number] [facility] [employer]  Right 1 Implanted   [id number] - [id number]   [id number]   Right 1 Implanted   GENERATOR PULSE IMPLANTABLE INSPIRE - [id number]  GENERATOR PULSE IMPLANTABLE INSPIRE [id number] [employer]  Right 1 Implanted         Drains: * No LDAs found *    Findings: see below     Detailed Description of Procedure:   Procedure:  12th cranial nerve (hypoglossal) stimulation implant (CPT [id number]), along with placement of chest wall respiratory sensor (CPT 0466T). Pre-Op Diagnosis:  Moderate /Severe obstructive sleep apnea with positive airway pressure intolerance (ICD-10 G47.33). Post-Op Diagnosis:  Moderate /Severe obstructive sleep apnea with positive pressure airway intolerance (ICD-10 G47.33). Anesthesia:   General endotracheal  Estimated Blood Loss:  10 mL. Complications:   None  Brief Clinical History: This is a 67y.o.-year-old patient with a history of Moderate to Severe obstructive sleep apnea, who is intolerant and unable to achieve benefit from positive pressure therapy. Patient has passed the clinical, polysomnographic, and endoscopic screening criteria and presents today for the implant.       Procedure

## 2023-04-17 ENCOUNTER — TELEPHONE (OUTPATIENT)
Dept: PRIMARY CARE CLINIC | Age: 73
End: 2023-04-17

## 2023-04-17 ENCOUNTER — TELEPHONE (OUTPATIENT)
Dept: ENT CLINIC | Age: 73
End: 2023-04-17

## 2023-04-17 ENCOUNTER — OFFICE VISIT (OUTPATIENT)
Dept: ENT CLINIC | Age: 73
End: 2023-04-17

## 2023-04-17 VITALS
SYSTOLIC BLOOD PRESSURE: 206 MMHG | DIASTOLIC BLOOD PRESSURE: 90 MMHG | BODY MASS INDEX: 28.19 KG/M2 | HEART RATE: 74 BPM | WEIGHT: 186 LBS | RESPIRATION RATE: 14 BRPM | HEIGHT: 68 IN

## 2023-04-17 DIAGNOSIS — Z99.89 OSA ON CPAP: Primary | ICD-10-CM

## 2023-04-17 DIAGNOSIS — G47.33 OSA ON CPAP: Primary | ICD-10-CM

## 2023-04-17 PROCEDURE — 99024 POSTOP FOLLOW-UP VISIT: CPT | Performed by: OTOLARYNGOLOGY

## 2023-04-17 NOTE — TELEPHONE ENCOUNTER
Placed call to PCP to notify of elevated blood pressure readings in office, spoke with Pepper Lee, she will discuss with Dr. Braydon Valentin and proceed as advised.   Electronically signed by Pat Yee LPN on 6/89/3573 at 6:11 AM

## 2023-04-17 NOTE — TELEPHONE ENCOUNTER
Received a call from Dusty at Dr. John Barreraous office letting us know that Neeta Penny was there this morning and she called to let us know that he has stopped taking his medications and his blood pressure was 206/90. Thank you.

## 2023-04-18 ENCOUNTER — OFFICE VISIT (OUTPATIENT)
Dept: PRIMARY CARE CLINIC | Age: 73
End: 2023-04-18
Payer: MEDICARE

## 2023-04-18 VITALS
OXYGEN SATURATION: 97 % | WEIGHT: 183 LBS | TEMPERATURE: 97.2 F | DIASTOLIC BLOOD PRESSURE: 80 MMHG | HEIGHT: 68 IN | SYSTOLIC BLOOD PRESSURE: 130 MMHG | HEART RATE: 83 BPM | BODY MASS INDEX: 27.74 KG/M2 | RESPIRATION RATE: 16 BRPM

## 2023-04-18 DIAGNOSIS — E78.5 SERUM LIPIDS HIGH: ICD-10-CM

## 2023-04-18 DIAGNOSIS — I10 PRIMARY HYPERTENSION: ICD-10-CM

## 2023-04-18 DIAGNOSIS — E11.9 TYPE 2 DIABETES MELLITUS WITHOUT COMPLICATION, WITHOUT LONG-TERM CURRENT USE OF INSULIN (HCC): Primary | ICD-10-CM

## 2023-04-18 DIAGNOSIS — G47.33 OSA (OBSTRUCTIVE SLEEP APNEA): ICD-10-CM

## 2023-04-18 DIAGNOSIS — R41.89 COGNITIVE DEFICITS: ICD-10-CM

## 2023-04-18 PROBLEM — G89.18 POST-OP PAIN: Status: RESOLVED | Noted: 2023-04-06 | Resolved: 2023-04-18

## 2023-04-18 PROBLEM — Z01.812 PRE-OPERATIVE LABORATORY EXAMINATION: Status: RESOLVED | Noted: 2023-04-06 | Resolved: 2023-04-18

## 2023-04-18 LAB — HBA1C MFR BLD: 6.5 %

## 2023-04-18 PROCEDURE — 1036F TOBACCO NON-USER: CPT | Performed by: INTERNAL MEDICINE

## 2023-04-18 PROCEDURE — G8417 CALC BMI ABV UP PARAM F/U: HCPCS | Performed by: INTERNAL MEDICINE

## 2023-04-18 PROCEDURE — 3079F DIAST BP 80-89 MM HG: CPT | Performed by: INTERNAL MEDICINE

## 2023-04-18 PROCEDURE — 99214 OFFICE O/P EST MOD 30 MIN: CPT | Performed by: INTERNAL MEDICINE

## 2023-04-18 PROCEDURE — G8427 DOCREV CUR MEDS BY ELIG CLIN: HCPCS | Performed by: INTERNAL MEDICINE

## 2023-04-18 PROCEDURE — 2022F DILAT RTA XM EVC RTNOPTHY: CPT | Performed by: INTERNAL MEDICINE

## 2023-04-18 PROCEDURE — 3044F HG A1C LEVEL LT 7.0%: CPT | Performed by: INTERNAL MEDICINE

## 2023-04-18 PROCEDURE — 3017F COLORECTAL CA SCREEN DOC REV: CPT | Performed by: INTERNAL MEDICINE

## 2023-04-18 PROCEDURE — 1123F ACP DISCUSS/DSCN MKR DOCD: CPT | Performed by: INTERNAL MEDICINE

## 2023-04-18 PROCEDURE — 3075F SYST BP GE 130 - 139MM HG: CPT | Performed by: INTERNAL MEDICINE

## 2023-04-18 PROCEDURE — 83036 HEMOGLOBIN GLYCOSYLATED A1C: CPT | Performed by: INTERNAL MEDICINE

## 2023-04-18 RX ORDER — LOSARTAN POTASSIUM 100 MG/1
100 TABLET ORAL DAILY
Qty: 90 TABLET | Refills: 0 | Status: SHIPPED | OUTPATIENT
Start: 2023-04-18

## 2023-04-18 RX ORDER — SIMVASTATIN 20 MG
20 TABLET ORAL DAILY
Qty: 90 TABLET | Refills: 0 | Status: SHIPPED | OUTPATIENT
Start: 2023-04-18

## 2023-04-18 RX ORDER — CLOTRIMAZOLE AND BETAMETHASONE DIPROPIONATE 10; .64 MG/G; MG/G
CREAM TOPICAL
Qty: 45 G | Refills: 2 | Status: SHIPPED | OUTPATIENT
Start: 2023-04-18

## 2023-04-18 RX ORDER — BLOOD SUGAR DIAGNOSTIC
100 STRIP MISCELLANEOUS 3 TIMES DAILY
Qty: 100 EACH | Refills: 2 | Status: SHIPPED | OUTPATIENT
Start: 2023-04-18

## 2023-04-18 SDOH — ECONOMIC STABILITY: FOOD INSECURITY: WITHIN THE PAST 12 MONTHS, YOU WORRIED THAT YOUR FOOD WOULD RUN OUT BEFORE YOU GOT MONEY TO BUY MORE.: NEVER TRUE

## 2023-04-18 SDOH — ECONOMIC STABILITY: HOUSING INSECURITY
IN THE LAST 12 MONTHS, WAS THERE A TIME WHEN YOU DID NOT HAVE A STEADY PLACE TO SLEEP OR SLEPT IN A SHELTER (INCLUDING NOW)?: NO

## 2023-04-18 SDOH — ECONOMIC STABILITY: INCOME INSECURITY: HOW HARD IS IT FOR YOU TO PAY FOR THE VERY BASICS LIKE FOOD, HOUSING, MEDICAL CARE, AND HEATING?: NOT HARD AT ALL

## 2023-04-18 SDOH — ECONOMIC STABILITY: FOOD INSECURITY: WITHIN THE PAST 12 MONTHS, THE FOOD YOU BOUGHT JUST DIDN'T LAST AND YOU DIDN'T HAVE MONEY TO GET MORE.: NEVER TRUE

## 2023-04-18 ASSESSMENT — PATIENT HEALTH QUESTIONNAIRE - PHQ9
2. FEELING DOWN, DEPRESSED OR HOPELESS: 0
SUM OF ALL RESPONSES TO PHQ9 QUESTIONS 1 & 2: 0
SUM OF ALL RESPONSES TO PHQ QUESTIONS 1-9: 0
1. LITTLE INTEREST OR PLEASURE IN DOING THINGS: 0
SUM OF ALL RESPONSES TO PHQ QUESTIONS 1-9: 0

## 2023-04-18 ASSESSMENT — ENCOUNTER SYMPTOMS
RHINORRHEA: 0
VOMITING: 0
CONSTIPATION: 0
SORE THROAT: 0
BACK PAIN: 0
APNEA: 1
DIARRHEA: 0
TROUBLE SWALLOWING: 0
ABDOMINAL DISTENTION: 0
SINUS PRESSURE: 0
BLOOD IN STOOL: 0
ALLERGIC/IMMUNOLOGIC NEGATIVE: 1
COLOR CHANGE: 0
ABDOMINAL PAIN: 0
NAUSEA: 0

## 2023-04-18 NOTE — PROGRESS NOTES
Geremias Bellamy presents today for follow up of Sleep Apnea, Diabetes, High chol, HTN, Cognitive impairment. Current Outpatient Medications   Medication Sig Dispense Refill    clotrimazole-betamethasone (LOTRISONE) 1-0.05 % cream Apply topically 2 times daily. 45 g 2    metFORMIN (GLUCOPHAGE) 850 MG tablet TAKE 1 TABLET(850 MG) BY MOUTH THREE TIMES DAILY WITH MEALS 270 tablet 0    losartan (COZAAR) 100 MG tablet Take 1 tablet by mouth daily 90 tablet 0    simvastatin (ZOCOR) 20 MG tablet Take 1 tablet by mouth daily 90 tablet 0    ACCU-CHEK GUIDE strip 100 each by Does not apply route 3 times daily 100 each 2     No current facility-administered medications for this visit. Past Medical History:   Diagnosis Date    Diabetes ([facility] [address] 2002    High cholesterol     Hypertension 2015    Mild cognitive impairment     States forgets somethings was told is due to the sleep apnea \"That is whey I am having the surgery\"    DINO (obstructive sleep apnea)     Pneumonia due to COVID-19 virus 2022          Subjective:  AS above. Just had Stimulator implanted due to Dx of Sleep Apnea, refers further adjustments with the cables are still pending. Refers has not used the home O2 for a while, is ready for it to be removed from home. Bs staying under 140. Follows a Diabetic diet. Hypertension  Pertinent negatives include no chest pain, headaches, neck pain or palpitations. Diabetes  Pertinent negatives for hypoglycemia include no dizziness, headaches, speech difficulty or tremors. Pertinent negatives for diabetes include no chest pain, no polydipsia, no polyuria and no weakness. Review of Systems   Constitutional:  Negative for activity change, appetite change and chills. HENT:  Negative for congestion, ear pain, mouth sores, postnasal drip, rhinorrhea, sinus pressure, sneezing, sore throat and trouble swallowing. Eyes:  Negative for visual disturbance. Respiratory:  Positive for apnea (during sleep).

## 2023-04-20 ENCOUNTER — TELEPHONE (OUTPATIENT)
Dept: PRIMARY CARE CLINIC | Age: 73
End: 2023-04-20

## 2023-05-03 ASSESSMENT — ENCOUNTER SYMPTOMS
SHORTNESS OF BREATH: 0
COUGH: 0
VOMITING: 0

## 2023-05-03 NOTE — PROGRESS NOTES
[facility]  Dr. Sam Coker. Gissell Cramer, [facility] Follow Up        Patient Name:  Stu Rangel  :  1950     CHIEF C/O:    Chief Complaint   Patient presents with    Post-Op Check     Post op hypoglossopharyngeal nerve stimulator   [facility] [id number]       HISTORY OBTAINED FROM:  patient    HISTORY OF PRESENT ILLNESS:       Rosy Padilla is a 67y.o. year old male, here today for follow up of status post implantation of a left neurostimulator. Patient was interviewed via  service, he has no new complaint, pain is improving, he is tolerating p.o. diet, he has no noted increase in swelling, no recent fever or chills, no change in voice    Review of Systems   Constitutional:  Negative for chills and fever. HENT:  Negative for ear discharge and hearing loss. Respiratory:  Negative for cough and shortness of breath. Cardiovascular:  Negative for chest pain and palpitations. Gastrointestinal:  Negative for vomiting. Skin:  Negative for rash. Allergic/Immunologic: Negative for environmental allergies. Neurological:  Negative for dizziness and headaches. Hematological:  Does not bruise/bleed easily. All other systems reviewed and are negative. BP (!) 206/90 (Site: Left Upper Arm, Position: Sitting, Cuff Size: Medium Adult)   Pulse 74   Resp 14   Ht 5' 8\" (1.727 m)   Wt 186 lb (84.4 kg)   BMI 28.28 kg/m²   Physical Exam  Vitals and nursing note reviewed. Constitutional:       Appearance: He is well-developed. HENT:      Head: Normocephalic and atraumatic. Right Ear: Tympanic membrane and ear canal normal.      Left Ear: Tympanic membrane and ear canal normal.      Nose: No congestion or rhinorrhea. Eyes:      Pupils: Pupils are equal, round, and reactive to light. Neck:      Thyroid: No thyromegaly. Trachea: No tracheal deviation. Cardiovascular:      Rate and Rhythm: Normal rate.    Pulmonary:      Effort: Pulmonary effort is normal. No

## 2023-05-09 NOTE — TELEPHONE ENCOUNTER
CHARU: Nicolle interpretur, New pt, Ref: Dr Narendra Evans [facility] (dtr speaks Pondera Handler) Scheduled 7/25/22. Statement Selected

## 2023-07-19 ENCOUNTER — OFFICE VISIT (OUTPATIENT)
Dept: PRIMARY CARE CLINIC | Age: 73
End: 2023-07-19
Payer: MEDICARE

## 2023-07-19 VITALS
DIASTOLIC BLOOD PRESSURE: 60 MMHG | WEIGHT: 191 LBS | HEIGHT: 68 IN | TEMPERATURE: 97.8 F | SYSTOLIC BLOOD PRESSURE: 120 MMHG | HEART RATE: 61 BPM | BODY MASS INDEX: 28.95 KG/M2 | OXYGEN SATURATION: 98 %

## 2023-07-19 DIAGNOSIS — E78.5 SERUM LIPIDS HIGH: ICD-10-CM

## 2023-07-19 DIAGNOSIS — R41.89 COGNITIVE DEFICITS: ICD-10-CM

## 2023-07-19 DIAGNOSIS — E11.9 TYPE 2 DIABETES MELLITUS WITHOUT COMPLICATION, WITHOUT LONG-TERM CURRENT USE OF INSULIN (HCC): ICD-10-CM

## 2023-07-19 DIAGNOSIS — Z00.00 INITIAL MEDICARE ANNUAL WELLNESS VISIT: Primary | ICD-10-CM

## 2023-07-19 DIAGNOSIS — I10 PRIMARY HYPERTENSION: ICD-10-CM

## 2023-07-19 PROCEDURE — 1036F TOBACCO NON-USER: CPT | Performed by: INTERNAL MEDICINE

## 2023-07-19 PROCEDURE — 3078F DIAST BP <80 MM HG: CPT | Performed by: INTERNAL MEDICINE

## 2023-07-19 PROCEDURE — G8417 CALC BMI ABV UP PARAM F/U: HCPCS | Performed by: INTERNAL MEDICINE

## 2023-07-19 PROCEDURE — G0439 PPPS, SUBSEQ VISIT: HCPCS | Performed by: INTERNAL MEDICINE

## 2023-07-19 PROCEDURE — 1123F ACP DISCUSS/DSCN MKR DOCD: CPT | Performed by: INTERNAL MEDICINE

## 2023-07-19 PROCEDURE — 3044F HG A1C LEVEL LT 7.0%: CPT | Performed by: INTERNAL MEDICINE

## 2023-07-19 PROCEDURE — 3074F SYST BP LT 130 MM HG: CPT | Performed by: INTERNAL MEDICINE

## 2023-07-19 PROCEDURE — 99213 OFFICE O/P EST LOW 20 MIN: CPT | Performed by: INTERNAL MEDICINE

## 2023-07-19 PROCEDURE — 3017F COLORECTAL CA SCREEN DOC REV: CPT | Performed by: INTERNAL MEDICINE

## 2023-07-19 PROCEDURE — 2022F DILAT RTA XM EVC RTNOPTHY: CPT | Performed by: INTERNAL MEDICINE

## 2023-07-19 PROCEDURE — G8427 DOCREV CUR MEDS BY ELIG CLIN: HCPCS | Performed by: INTERNAL MEDICINE

## 2023-07-19 RX ORDER — CLOTRIMAZOLE AND BETAMETHASONE DIPROPIONATE 10; .64 MG/G; MG/G
CREAM TOPICAL
Qty: 45 G | Refills: 2 | Status: SHIPPED | OUTPATIENT
Start: 2023-07-19

## 2023-07-19 RX ORDER — SIMVASTATIN 20 MG
20 TABLET ORAL DAILY
Qty: 90 TABLET | Refills: 0 | Status: SHIPPED | OUTPATIENT
Start: 2023-07-19

## 2023-07-19 RX ORDER — LOSARTAN POTASSIUM 100 MG/1
100 TABLET ORAL DAILY
Qty: 90 TABLET | Refills: 0 | Status: SHIPPED | OUTPATIENT
Start: 2023-07-19

## 2023-07-19 RX ORDER — BLOOD SUGAR DIAGNOSTIC
100 STRIP MISCELLANEOUS 3 TIMES DAILY
Qty: 100 EACH | Refills: 2 | Status: SHIPPED | OUTPATIENT
Start: 2023-07-19

## 2023-07-19 ASSESSMENT — ENCOUNTER SYMPTOMS
ALLERGIC/IMMUNOLOGIC NEGATIVE: 1
NAUSEA: 0
BLOOD IN STOOL: 0
TROUBLE SWALLOWING: 0
ABDOMINAL DISTENTION: 0
SINUS PRESSURE: 0
CONSTIPATION: 0
SORE THROAT: 0
COLOR CHANGE: 0
VOMITING: 0
DIARRHEA: 0
BACK PAIN: 1
RHINORRHEA: 0
ABDOMINAL PAIN: 0

## 2023-07-19 ASSESSMENT — PATIENT HEALTH QUESTIONNAIRE - PHQ9
SUM OF ALL RESPONSES TO PHQ9 QUESTIONS 1 & 2: 0
SUM OF ALL RESPONSES TO PHQ QUESTIONS 1-9: 0
1. LITTLE INTEREST OR PLEASURE IN DOING THINGS: 0
2. FEELING DOWN, DEPRESSED OR HOPELESS: 0
SUM OF ALL RESPONSES TO PHQ QUESTIONS 1-9: 0

## 2023-07-19 ASSESSMENT — LIFESTYLE VARIABLES
HOW OFTEN DO YOU HAVE A DRINK CONTAINING ALCOHOL: NEVER
HOW MANY STANDARD DRINKS CONTAINING ALCOHOL DO YOU HAVE ON A TYPICAL DAY: PATIENT DOES NOT DRINK

## 2023-07-19 NOTE — PATIENT INSTRUCTIONS
Advance Directives: Care Instructions  Overview  An advance directive is a legal way to state your wishes at the end of your life. It tells your family and your doctor what to do if you can't say what you want. There are two main types of advance directives. You can change them any time your wishes change. Living will. This form tells your family and your doctor your wishes about life support and other treatment. The form is also called a declaration. Medical power of . This form lets you name a person to make treatment decisions for you when you can't speak for yourself. This person is called a health care agent (health care proxy, health care surrogate). The form is also called a durable power of  for health care. If you do not have an advance directive, decisions about your medical care may be made by a family member, or by a doctor or a  who doesn't know you. It may help to think of an advance directive as a gift to the people who care for you. If you have one, they won't have to make tough decisions by themselves. For more information, including forms for your state, see the [address] [place] [address] website (www.caringinfo.org/planning/advance-directives/). Follow-up care is a key part of your treatment and safety. Be sure to make and go to all appointments, and call your doctor if you are having problems. It's also a good idea to know your test results and keep a list of the medicines you take. What should you include in an advance directive? Many states have a unique advance directive form. (It may ask you to address specific issues.) Or you might use a universal form that's approved by many states. If your form doesn't tell you what to address, it may be hard to know what to include in your advance directive. Use the questions below to help you get started. Who do you want to make decisions about your medical care if you are not able to?   What life-support measures do you want if you

## 2023-07-19 NOTE — PROGRESS NOTES
Medicare Annual Wellness Visit    Tuan Mauricio is here for Diabetes (Diabetic Exam; due for microalbummin and foot exam?) and Lower Back Pain (Lower back is hurting after working too hard lately.)    Assessment & Plan   Initial Medicare annual wellness visit  Primary hypertension  Comments:  Controlled on meds, continue, reminded of low salt diet  Type 2 diabetes mellitus without complication, without long-term current use of insulin (HCC)  Comments:  Great control, A1C at 6.5, cont same  Serum lipids high  Comments:  Controlled on statin, reminded of dietary restricitons, RTC fasting for cmp and lipids  Cognitive deficits  Comments:  No deterioration, continue memory pt, stay mentally challenegd and socially connected  Recommendations for Preventive Services Due: see orders and patient instructions/AVS.  Recommended screening schedule for the next 5-10 years is provided to the patient in written form: see Patient Instructions/AVS.     Return in 3 months (on 10/19/2023) for Medicare Annual Wellness Visit in 1 year, follow up, bloodwork. Subjective       Patient's complete Health Risk Assessment and screening values have been reviewed and are found in Flowsheets. The following problems were reviewed today and where indicated follow up appointments were made and/or referrals ordered. Positive Risk Factor Screenings with Interventions:       Cognitive: Words recalled: 2 Words Recalled           Total Score Interpretation: Abnormal Mini-Cog      InterventionHank Simms presents today for     Current Outpatient Medications   Medication Sig Dispense Refill    ACCU-CHEK GUIDE strip 100 each by Does not apply route 3 times daily 100 each 2    clotrimazole-betamethasone (LOTRISONE) 1-0.05 % cream Apply topically 2 times daily.  45 g 2    diclofenac (VOLTAREN) 50 MG EC tablet TAKE 1 TABLET BY MOUTH TWICE DAILY AS NEEDED 60 tablet 0    losartan (COZAAR) 100 MG tablet Take 1 tablet by mouth daily

## 2023-09-05 ENCOUNTER — APPOINTMENT (OUTPATIENT)
Dept: CT IMAGING | Age: 73
End: 2023-09-05
Payer: MEDICARE

## 2023-09-05 ENCOUNTER — HOSPITAL ENCOUNTER (EMERGENCY)
Age: 73
Discharge: HOME | End: 2023-09-05
Attending: EMERGENCY MEDICINE
Payer: MEDICARE

## 2023-09-05 ENCOUNTER — APPOINTMENT (OUTPATIENT)
Dept: GENERAL RADIOLOGY | Age: 73
End: 2023-09-05
Payer: MEDICARE

## 2023-09-05 VITALS
HEIGHT: 68 IN | WEIGHT: 189 LBS | RESPIRATION RATE: 18 BRPM | HEART RATE: 66 BPM | SYSTOLIC BLOOD PRESSURE: 172 MMHG | DIASTOLIC BLOOD PRESSURE: 89 MMHG | BODY MASS INDEX: 28.64 KG/M2 | TEMPERATURE: 98.2 F | OXYGEN SATURATION: 98 %

## 2023-09-05 DIAGNOSIS — G51.0 BELL'S PALSY: Primary | ICD-10-CM

## 2023-09-05 LAB
ANION GAP SERPL CALCULATED.3IONS-SCNC: 14 MMOL/L (ref 7–16)
BUN SERPL-MCNC: 20 MG/DL (ref 6–23)
CALCIUM SERPL-MCNC: 9.8 MG/DL (ref 8.6–10.2)
CHLORIDE SERPL-SCNC: 101 MMOL/L (ref 98–107)
CHP ED QC CHECK: NORMAL
CO2 SERPL-SCNC: 26 MMOL/L (ref 22–29)
CREAT SERPL-MCNC: 0.8 MG/DL (ref 0.7–1.2)
ERYTHROCYTE [DISTWIDTH] IN BLOOD BY AUTOMATED COUNT: 12.1 % (ref 11.5–15)
GFR SERPL CREATININE-BSD FRML MDRD: >60 ML/MIN/1.73M2
GLUCOSE BLD-MCNC: 93 MG/DL
GLUCOSE BLD-MCNC: 93 MG/DL (ref 74–99)
GLUCOSE SERPL-MCNC: 104 MG/DL (ref 74–99)
HCT VFR BLD AUTO: 39.3 % (ref 37–54)
HGB BLD-MCNC: 13.4 G/DL (ref 12.5–16.5)
INR PPP: 1
MCH RBC QN AUTO: 31.3 PG (ref 26–35)
MCHC RBC AUTO-ENTMCNC: 34.1 G/DL (ref 32–34.5)
MCV RBC AUTO: 91.8 FL (ref 80–99.9)
PARTIAL THROMBOPLASTIN TIME: 34.1 SEC (ref 24.5–35.1)
PLATELET # BLD AUTO: 305 K/UL (ref 130–450)
PMV BLD AUTO: 9.6 FL (ref 7–12)
POTASSIUM SERPL-SCNC: 4.6 MMOL/L (ref 3.5–5)
PROTHROMBIN TIME: 11.4 SEC (ref 9.3–12.4)
RBC # BLD AUTO: 4.28 M/UL (ref 3.8–5.8)
SODIUM SERPL-SCNC: 141 MMOL/L (ref 132–146)
TROPONIN I SERPL HS-MCNC: 11 NG/L (ref 0–11)
WBC OTHER # BLD: 8.5 K/UL (ref 4.5–11.5)

## 2023-09-05 PROCEDURE — 85610 PROTHROMBIN TIME: CPT

## 2023-09-05 PROCEDURE — 6370000000 HC RX 637 (ALT 250 FOR IP): Performed by: EMERGENCY MEDICINE

## 2023-09-05 PROCEDURE — 80048 BASIC METABOLIC PNL TOTAL CA: CPT

## 2023-09-05 PROCEDURE — 70496 CT ANGIOGRAPHY HEAD: CPT

## 2023-09-05 PROCEDURE — 93005 ELECTROCARDIOGRAM TRACING: CPT | Performed by: EMERGENCY MEDICINE

## 2023-09-05 PROCEDURE — 70450 CT HEAD/BRAIN W/O DYE: CPT

## 2023-09-05 PROCEDURE — 84484 ASSAY OF TROPONIN QUANT: CPT

## 2023-09-05 PROCEDURE — 99285 EMERGENCY DEPT VISIT HI MDM: CPT

## 2023-09-05 PROCEDURE — 85730 THROMBOPLASTIN TIME PARTIAL: CPT

## 2023-09-05 PROCEDURE — 6360000004 HC RX CONTRAST MEDICATION: Performed by: RADIOLOGY

## 2023-09-05 PROCEDURE — 85027 COMPLETE CBC AUTOMATED: CPT

## 2023-09-05 PROCEDURE — 82962 GLUCOSE BLOOD TEST: CPT

## 2023-09-05 PROCEDURE — 71045 X-RAY EXAM CHEST 1 VIEW: CPT

## 2023-09-05 PROCEDURE — 70498 CT ANGIOGRAPHY NECK: CPT

## 2023-09-05 RX ORDER — PREDNISONE 20 MG/1
60 TABLET ORAL ONCE
Status: COMPLETED | OUTPATIENT
Start: 2023-09-05 | End: 2023-09-05

## 2023-09-05 RX ORDER — ACYCLOVIR 800 MG/1
800 TABLET ORAL 3 TIMES DAILY
Qty: 21 TABLET | Refills: 0 | Status: SHIPPED | OUTPATIENT
Start: 2023-09-05 | End: 2023-09-12

## 2023-09-05 RX ORDER — ACYCLOVIR 200 MG/1
800 CAPSULE ORAL ONCE
Status: COMPLETED | OUTPATIENT
Start: 2023-09-05 | End: 2023-09-05

## 2023-09-05 RX ORDER — MINERAL OIL, PETROLATUM 425; 568 MG/G; MG/G
1 OINTMENT OPHTHALMIC PRN
Qty: 3.5 G | Refills: 0 | Status: SHIPPED | OUTPATIENT
Start: 2023-09-05 | End: 2023-10-05

## 2023-09-05 RX ORDER — PREDNISONE 20 MG/1
TABLET ORAL
Qty: 14 TABLET | Refills: 0 | Status: SHIPPED | OUTPATIENT
Start: 2023-09-05 | End: 2023-09-15

## 2023-09-05 RX ADMIN — ACYCLOVIR 800 MG: 200 CAPSULE ORAL at 18:31

## 2023-09-05 RX ADMIN — IOPAMIDOL 75 ML: 755 INJECTION, SOLUTION INTRAVENOUS at 17:13

## 2023-09-05 RX ADMIN — PREDNISONE 60 MG: 20 TABLET ORAL at 18:31

## 2023-09-05 ASSESSMENT — ENCOUNTER SYMPTOMS
BACK PAIN: 0
DIARRHEA: 0
SHORTNESS OF BREATH: 0
SORE THROAT: 0
VOMITING: 0
WHEEZING: 0
NAUSEA: 0
COUGH: 0
SINUS PRESSURE: 0
VISUAL CHANGE: 0
ABDOMINAL PAIN: 0

## 2023-09-05 ASSESSMENT — PAIN - FUNCTIONAL ASSESSMENT: PAIN_FUNCTIONAL_ASSESSMENT: NONE - DENIES PAIN

## 2023-09-06 LAB
EKG ATRIAL RATE: 74 BPM
EKG P AXIS: 77 DEGREES
EKG P-R INTERVAL: 220 MS
EKG Q-T INTERVAL: 382 MS
EKG QRS DURATION: 92 MS
EKG QTC CALCULATION (BAZETT): 424 MS
EKG R AXIS: 35 DEGREES
EKG T AXIS: 61 DEGREES
EKG VENTRICULAR RATE: 74 BPM

## 2023-09-06 PROCEDURE — 93010 ELECTROCARDIOGRAM REPORT: CPT | Performed by: INTERNAL MEDICINE

## 2023-09-14 ENCOUNTER — OFFICE VISIT (OUTPATIENT)
Dept: NEUROLOGY | Age: 73
End: 2023-09-14

## 2023-09-14 VITALS
RESPIRATION RATE: 18 BRPM | HEIGHT: 68 IN | HEART RATE: 78 BPM | OXYGEN SATURATION: 98 % | SYSTOLIC BLOOD PRESSURE: 156 MMHG | DIASTOLIC BLOOD PRESSURE: 75 MMHG | TEMPERATURE: 97.6 F | WEIGHT: 190 LBS | BODY MASS INDEX: 28.79 KG/M2

## 2023-09-14 DIAGNOSIS — G30.9 ALZHEIMER'S DISEASE, UNSPECIFIED (CODE) (HCC): ICD-10-CM

## 2023-09-14 DIAGNOSIS — G51.0 BELL'S PALSY: ICD-10-CM

## 2023-09-14 DIAGNOSIS — R29.90 STROKE-LIKE SYMPTOM: Primary | ICD-10-CM

## 2023-09-14 RX ORDER — ASPIRIN 81 MG/1
81 TABLET, CHEWABLE ORAL DAILY
COMMUNITY

## 2023-09-14 RX ORDER — LORAZEPAM 1 MG/1
1 TABLET ORAL ONCE
Qty: 1 TABLET | Refills: 0 | Status: SHIPPED | OUTPATIENT
Start: 2023-09-14 | End: 2023-09-14

## 2023-09-14 RX ORDER — MEMANTINE HYDROCHLORIDE 5 MG/1
5 TABLET ORAL DAILY
Qty: 60 TABLET | Refills: 2 | Status: SHIPPED | OUTPATIENT
Start: 2023-09-14

## 2023-09-14 NOTE — PROGRESS NOTES
[id number] [facility] [address] Felicity Farley M.D., F.A.C.P. Pretty Saavedra, DNP, APRN, ACNS-BC  Marylin Hdz. Saida Land, MSN, APRN-FNP-C  Kamini Beckham, MSN, APRN-FNP-C  Librado Márquez, MCKENZIE, PA-C  Elizabeth Leyva, MSN, APRN-FNP-C  [facility], [address] [place] [address]  Phone: [phone]  Fax: [phone]       Yoli Shi is a 75 y.o. right handed male     Patient follows for Alzheimer's, history of stroke, Covid     Patient is Romansh speaking     Onset was several years ago as he was seeing a Neurologist in [place].  He has noticed difficulty getting his words out and remembering things that were just told to him. He has no issues with ADL's. He is able to dress himself and care for himself. He has gotten lost twice while driving. He has forgotten his bank PIN number a few times which worries him. His wife has noticed some memory loss as well. He has not been started on any new medications. He was recently seen by Sleep medicine and has been diagnosed with DINO. His report indicates that he does meet criteria for diagnosis of severe DINO syndrome with apnea/hypoapnea index. He has a follow up appt with Sleep on 5/29/2021 and would benefit from CPAP titration and would avoid sedation on this patient. He was started on Aricept by his PCP but patient could not tolerate and it was discontinued due to GI upset. He had an upper airway stimulation implant to help with his sleep apnea. Pt recently in ED for Bell's palsy he had some tongue heaviness and slurred speech while in [place]. Got worked up there and was told it was his sleep apnea mask. He came back to [place] and went to PCP who then sent him to ED for evaluation. He did have a virus while in [place], URI. He was worked up with [facility] which was negative and given steroids and antiviral which he has already completed. He continues to have tongue heaviness and some dysarthria.   We discussed SLP

## 2023-09-27 ENCOUNTER — TELEPHONE (OUTPATIENT)
Dept: NEUROLOGY | Age: 73
End: 2023-09-27

## 2023-09-27 DIAGNOSIS — G51.0 BELL'S PALSY: Primary | ICD-10-CM

## 2023-10-18 ENCOUNTER — OFFICE VISIT (OUTPATIENT)
Dept: PRIMARY CARE CLINIC | Age: 73
End: 2023-10-18

## 2023-10-18 VITALS
HEIGHT: 68 IN | TEMPERATURE: 97.3 F | OXYGEN SATURATION: 98 % | BODY MASS INDEX: 28.79 KG/M2 | SYSTOLIC BLOOD PRESSURE: 128 MMHG | WEIGHT: 190 LBS | DIASTOLIC BLOOD PRESSURE: 70 MMHG | HEART RATE: 67 BPM

## 2023-10-18 DIAGNOSIS — E78.5 SERUM LIPIDS HIGH: ICD-10-CM

## 2023-10-18 DIAGNOSIS — G30.9 ALZHEIMER'S DISEASE, UNSPECIFIED (CODE) (HCC): ICD-10-CM

## 2023-10-18 DIAGNOSIS — M54.50 ACUTE BILATERAL LOW BACK PAIN WITHOUT SCIATICA: ICD-10-CM

## 2023-10-18 DIAGNOSIS — E11.9 TYPE 2 DIABETES MELLITUS WITHOUT COMPLICATION, WITHOUT LONG-TERM CURRENT USE OF INSULIN (HCC): Primary | ICD-10-CM

## 2023-10-18 DIAGNOSIS — M54.16 LUMBAR RADICULOPATHY: ICD-10-CM

## 2023-10-18 DIAGNOSIS — I10 PRIMARY HYPERTENSION: ICD-10-CM

## 2023-10-18 DIAGNOSIS — R47.1 DYSARTHRIA: ICD-10-CM

## 2023-10-18 LAB — HBA1C MFR BLD: 6.6 %

## 2023-10-18 RX ORDER — BLOOD SUGAR DIAGNOSTIC
100 STRIP MISCELLANEOUS 3 TIMES DAILY
Qty: 100 EACH | Refills: 2 | Status: SHIPPED | OUTPATIENT
Start: 2023-10-18

## 2023-10-18 RX ORDER — LOSARTAN POTASSIUM 100 MG/1
100 TABLET ORAL DAILY
Qty: 90 TABLET | Refills: 0 | Status: SHIPPED | OUTPATIENT
Start: 2023-10-18

## 2023-10-18 RX ORDER — MEMANTINE HYDROCHLORIDE 5 MG/1
5 TABLET ORAL DAILY
Qty: 90 TABLET | Refills: 0 | Status: SHIPPED | OUTPATIENT
Start: 2023-10-18

## 2023-10-18 ASSESSMENT — ENCOUNTER SYMPTOMS
ABDOMINAL PAIN: 0
TROUBLE SWALLOWING: 0
SORE THROAT: 0
DIARRHEA: 0
BACK PAIN: 1
ABDOMINAL DISTENTION: 0
CONSTIPATION: 0
ALLERGIC/IMMUNOLOGIC NEGATIVE: 1
SINUS PRESSURE: 0
VOMITING: 0
RHINORRHEA: 0
NAUSEA: 0
COLOR CHANGE: 0
BLOOD IN STOOL: 0

## 2023-10-18 NOTE — PROGRESS NOTES
times daily  -     losartan (COZAAR) 100 MG tablet; Take 1 tablet by mouth daily  -     memantine (NAMENDA) 5 MG tablet; Take 1 tablet by mouth daily  -     metFORMIN (GLUCOPHAGE) 1000 MG tablet;  Take 1 tablet by mouth 2 times daily (with meals)

## 2023-10-27 ENCOUNTER — HOSPITAL ENCOUNTER (OUTPATIENT)
Age: 73
Discharge: HOME | End: 2023-10-27
Payer: MEDICARE

## 2023-10-27 ENCOUNTER — HOSPITAL ENCOUNTER (OUTPATIENT)
Dept: GENERAL RADIOLOGY | Age: 73
End: 2023-10-27
Payer: MEDICARE

## 2023-10-27 ENCOUNTER — HOSPITAL ENCOUNTER (OUTPATIENT)
Dept: CT IMAGING | Age: 73
End: 2023-10-27
Payer: MEDICARE

## 2023-10-27 DIAGNOSIS — R29.90 STROKE-LIKE SYMPTOM: ICD-10-CM

## 2023-10-27 DIAGNOSIS — G51.0 BELL'S PALSY: ICD-10-CM

## 2023-10-27 LAB
ALBUMIN SERPL-MCNC: 4.5 G/DL (ref 3.5–5.2)
ALP SERPL-CCNC: 65 U/L (ref 40–129)
ALT SERPL-CCNC: 22 U/L (ref 0–40)
ANION GAP SERPL CALCULATED.3IONS-SCNC: 13 MMOL/L (ref 7–16)
AST SERPL-CCNC: 19 U/L (ref 0–39)
BILIRUB SERPL-MCNC: 0.4 MG/DL (ref 0–1.2)
BUN SERPL-MCNC: 19 MG/DL (ref 6–23)
CALCIUM SERPL-MCNC: 9.8 MG/DL (ref 8.6–10.2)
CHLORIDE SERPL-SCNC: 100 MMOL/L (ref 98–107)
CO2 SERPL-SCNC: 26 MMOL/L (ref 22–29)
CREAT SERPL-MCNC: 0.7 MG/DL (ref 0.7–1.2)
GFR SERPL CREATININE-BSD FRML MDRD: >60 ML/MIN/1.73M2
GLUCOSE SERPL-MCNC: 142 MG/DL (ref 74–99)
POTASSIUM SERPL-SCNC: 4.4 MMOL/L (ref 3.5–5)
PROT SERPL-MCNC: 7.7 G/DL (ref 6.4–8.3)
SODIUM SERPL-SCNC: 139 MMOL/L (ref 132–146)

## 2023-10-27 PROCEDURE — 92611 MOTION FLUOROSCOPY/SWALLOW: CPT

## 2023-10-27 PROCEDURE — 74230 X-RAY XM SWLNG FUNCJ C+: CPT

## 2023-10-27 PROCEDURE — 2500000003 HC RX 250 WO HCPCS: Performed by: PHYSICIAN ASSISTANT

## 2023-10-27 PROCEDURE — 80053 COMPREHEN METABOLIC PANEL: CPT

## 2023-10-27 PROCEDURE — 92526 ORAL FUNCTION THERAPY: CPT

## 2023-10-27 PROCEDURE — 70470 CT HEAD/BRAIN W/O & W/DYE: CPT

## 2023-10-27 PROCEDURE — 6360000004 HC RX CONTRAST MEDICATION: Performed by: RADIOLOGY

## 2023-10-27 PROCEDURE — 36415 COLL VENOUS BLD VENIPUNCTURE: CPT

## 2023-10-27 RX ORDER — SODIUM CHLORIDE 0.9 % (FLUSH) 0.9 %
10 SYRINGE (ML) INJECTION
Status: ACTIVE | OUTPATIENT
Start: 2023-10-27 | End: 2023-10-28

## 2023-10-27 RX ADMIN — BARIUM SULFATE 15 ML: 0.81 POWDER, FOR SUSPENSION ORAL at 13:21

## 2023-10-27 RX ADMIN — IOPAMIDOL 75 ML: 755 INJECTION, SOLUTION INTRAVENOUS at 13:58

## 2023-10-27 RX ADMIN — BARIUM SULFATE 15 ML: 400 PASTE ORAL at 13:21

## 2023-10-27 RX ADMIN — BARIUM SULFATE 15 ML: 400 SUSPENSION ORAL at 13:20

## 2023-10-27 NOTE — PROGRESS NOTES
SPEECH/LANGUAGE PATHOLOGY  VIDEOFLUOROSCOPIC STUDY OF SWALLOWING (MBS)   and PLAN OF CARE    PATIENT NAME:  Gurpreet Bear  (male)     MRN:  [id number]    :  1950  (75 y.o.)  STATUS:  Outpatient    TODAY'S DATE:  10/27/2023  REFERRING PROVIDER:  Dr. Pedro Gallardo: [place] modified barium swallow with video  Date of order:  23   REASON FOR REFERRAL: CVA   EVALUATING THERAPIST: Yael Gregory SLP      RESULTS:      DYSPHAGIA DIAGNOSIS:  functional swallow for age/premorbid functioning    DIET RECOMMENDATIONS:  Regular consistency solids (IDDSI level 7) with  thin liquids (IDDSI level 0)    FEEDING RECOMMENDATIONS:    Assistance level:  No assistance needed     Compensatory strategies recommended: Small sips and No straw     Discussed recommendations with nursing and/or faxed report to referring provider: Yes    SPEECH THERAPY  PLAN OF CARE   The dysphagia POC is established based on physician order and dysphagia diagnosis    Dysphagia therapy is not recommended       Conditions Requiring Skilled Therapeutic Intervention for dysphagia:    Not applicable    SPECIFIC DYSPHAGIA INTERVENTIONS TO INCLUDE:     not applicable    Specific instructions for next treatment:  not applicable   Treatment Goals:    Short Term Goals:  Not applicable no therapy warranted     Long Term Goals:   Not applicable no therapy warranted      Patient/family Goal:    not applicable                  ADMITTING DIAGNOSIS: Stroke-like symptom [R29.90]     VISIT DIAGNOSIS:   Visit Diagnoses         Codes    Stroke-like symptom     R29.90                PATIENT REPORT/COMPLAINT: occasional cough    PRIOR LEVEL OF SWALLOW FUNCTION:    Past History of Dysphagia?:  none reported    Home diet: Regular consistency solids (IDDSI level 7) with  thin liquids (IDDSI level 0)  Current Diet Order: No diet orders on file  PROCEDURE:  Consistencies Administered During the Evaluation   Liquids: thin liquid and nectar thick

## 2024-01-08 DIAGNOSIS — E11.9 TYPE 2 DIABETES MELLITUS WITHOUT COMPLICATION, WITHOUT LONG-TERM CURRENT USE OF INSULIN (HCC): Primary | ICD-10-CM

## 2024-01-08 DIAGNOSIS — E11.9 TYPE 2 DIABETES MELLITUS WITHOUT COMPLICATION, WITHOUT LONG-TERM CURRENT USE OF INSULIN (HCC): ICD-10-CM

## 2024-01-08 LAB
ABSOLUTE IMMATURE GRANULOCYTE: 0.07 K/UL (ref 0–0.58)
ALBUMIN SERPL-MCNC: 4.3 G/DL (ref 3.5–5.2)
ALP BLD-CCNC: 67 U/L (ref 40–129)
ALT SERPL-CCNC: 15 U/L (ref 0–40)
ANION GAP SERPL CALCULATED.3IONS-SCNC: 19 MMOL/L (ref 7–16)
AST SERPL-CCNC: 18 U/L (ref 0–39)
BASOPHILS ABSOLUTE: 0.1 K/UL (ref 0–0.2)
BASOPHILS RELATIVE PERCENT: 1 % (ref 0–2)
BILIRUB SERPL-MCNC: 0.3 MG/DL (ref 0–1.2)
BUN BLDV-MCNC: 17 MG/DL (ref 6–23)
CALCIUM SERPL-MCNC: 8.9 MG/DL (ref 8.6–10.2)
CHLORIDE BLD-SCNC: 102 MMOL/L (ref 98–107)
CHOLESTEROL: 192 MG/DL
CO2: 22 MMOL/L (ref 22–29)
CREAT SERPL-MCNC: 0.8 MG/DL (ref 0.7–1.2)
EOSINOPHILS ABSOLUTE: 0.23 K/UL (ref 0.05–0.5)
EOSINOPHILS RELATIVE PERCENT: 3 % (ref 0–6)
GFR SERPL CREATININE-BSD FRML MDRD: >60 ML/MIN/1.73M2
GLUCOSE BLD-MCNC: 152 MG/DL (ref 74–99)
HBA1C MFR BLD: 7.1 % (ref 4–5.6)
HCT VFR BLD CALC: 40.7 % (ref 37–54)
HDLC SERPL-MCNC: 44 MG/DL
HEMOGLOBIN: 13.4 G/DL (ref 12.5–16.5)
IMMATURE GRANULOCYTES: 1 % (ref 0–5)
LDL CHOLESTEROL: 122 MG/DL
LYMPHOCYTES ABSOLUTE: 1.53 K/UL (ref 1.5–4)
LYMPHOCYTES RELATIVE PERCENT: 21 % (ref 20–42)
MCH RBC QN AUTO: 31.3 PG (ref 26–35)
MCHC RBC AUTO-ENTMCNC: 32.9 G/DL (ref 32–34.5)
MCV RBC AUTO: 95.1 FL (ref 80–99.9)
MONOCYTES ABSOLUTE: 0.64 K/UL (ref 0.1–0.95)
MONOCYTES RELATIVE PERCENT: 9 % (ref 2–12)
NEUTROPHILS ABSOLUTE: 4.71 K/UL (ref 1.8–7.3)
NEUTROPHILS RELATIVE PERCENT: 65 % (ref 43–80)
PDW BLD-RTO: 12.2 % (ref 11.5–15)
PLATELET # BLD: 288 K/UL (ref 130–450)
PMV BLD AUTO: 10.5 FL (ref 7–12)
POTASSIUM SERPL-SCNC: 4.7 MMOL/L (ref 3.5–5)
RBC # BLD: 4.28 M/UL (ref 3.8–5.8)
SODIUM BLD-SCNC: 143 MMOL/L (ref 132–146)
TOTAL PROTEIN: 7.3 G/DL (ref 6.4–8.3)
TRIGL SERPL-MCNC: 131 MG/DL
VLDLC SERPL CALC-MCNC: 26 MG/DL
WBC # BLD: 7.3 K/UL (ref 4.5–11.5)

## 2024-01-09 ENCOUNTER — OFFICE VISIT (OUTPATIENT)
Dept: PRIMARY CARE CLINIC | Age: 74
End: 2024-01-09
Payer: MEDICARE

## 2024-01-09 VITALS
HEIGHT: 68 IN | HEART RATE: 76 BPM | BODY MASS INDEX: 29.25 KG/M2 | DIASTOLIC BLOOD PRESSURE: 70 MMHG | TEMPERATURE: 97.1 F | SYSTOLIC BLOOD PRESSURE: 122 MMHG | WEIGHT: 193 LBS | OXYGEN SATURATION: 98 %

## 2024-01-09 DIAGNOSIS — G31.84 MILD COGNITIVE IMPAIRMENT: ICD-10-CM

## 2024-01-09 DIAGNOSIS — E11.9 TYPE 2 DIABETES MELLITUS WITHOUT COMPLICATION, WITHOUT LONG-TERM CURRENT USE OF INSULIN (HCC): ICD-10-CM

## 2024-01-09 DIAGNOSIS — I10 PRIMARY HYPERTENSION: ICD-10-CM

## 2024-01-09 DIAGNOSIS — E11.9 DIABETES MELLITUS WITHOUT COMPLICATION (HCC): Primary | ICD-10-CM

## 2024-01-09 DIAGNOSIS — E78.5 SERUM LIPIDS HIGH: ICD-10-CM

## 2024-01-09 PROCEDURE — G8427 DOCREV CUR MEDS BY ELIG CLIN: HCPCS | Performed by: INTERNAL MEDICINE

## 2024-01-09 PROCEDURE — 2022F DILAT RTA XM EVC RTNOPTHY: CPT | Performed by: INTERNAL MEDICINE

## 2024-01-09 PROCEDURE — 3017F COLORECTAL CA SCREEN DOC REV: CPT | Performed by: INTERNAL MEDICINE

## 2024-01-09 PROCEDURE — 1036F TOBACCO NON-USER: CPT | Performed by: INTERNAL MEDICINE

## 2024-01-09 PROCEDURE — 3051F HG A1C>EQUAL 7.0%<8.0%: CPT | Performed by: INTERNAL MEDICINE

## 2024-01-09 PROCEDURE — G8417 CALC BMI ABV UP PARAM F/U: HCPCS | Performed by: INTERNAL MEDICINE

## 2024-01-09 PROCEDURE — G8484 FLU IMMUNIZE NO ADMIN: HCPCS | Performed by: INTERNAL MEDICINE

## 2024-01-09 PROCEDURE — 3074F SYST BP LT 130 MM HG: CPT | Performed by: INTERNAL MEDICINE

## 2024-01-09 PROCEDURE — 1123F ACP DISCUSS/DSCN MKR DOCD: CPT | Performed by: INTERNAL MEDICINE

## 2024-01-09 PROCEDURE — 3078F DIAST BP <80 MM HG: CPT | Performed by: INTERNAL MEDICINE

## 2024-01-09 PROCEDURE — 99214 OFFICE O/P EST MOD 30 MIN: CPT | Performed by: INTERNAL MEDICINE

## 2024-01-09 RX ORDER — LOSARTAN POTASSIUM 100 MG/1
100 TABLET ORAL DAILY
Qty: 90 TABLET | Refills: 0 | Status: SHIPPED | OUTPATIENT
Start: 2024-01-09

## 2024-01-09 RX ORDER — GLUCOSAMINE HCL/CHONDROITIN SU 500-400 MG
CAPSULE ORAL
Qty: 100 STRIP | Refills: 2 | Status: SHIPPED | OUTPATIENT
Start: 2024-01-09

## 2024-01-09 ASSESSMENT — PATIENT HEALTH QUESTIONNAIRE - PHQ9
1. LITTLE INTEREST OR PLEASURE IN DOING THINGS: 0
SUM OF ALL RESPONSES TO PHQ9 QUESTIONS 1 & 2: 0
SUM OF ALL RESPONSES TO PHQ QUESTIONS 1-9: 0
2. FEELING DOWN, DEPRESSED OR HOPELESS: 0

## 2024-01-09 ASSESSMENT — ENCOUNTER SYMPTOMS
SINUS PRESSURE: 0
BACK PAIN: 0
TROUBLE SWALLOWING: 0
ABDOMINAL PAIN: 0
SORE THROAT: 0
RHINORRHEA: 0
NAUSEA: 0
ALLERGIC/IMMUNOLOGIC NEGATIVE: 1
ABDOMINAL DISTENTION: 0
CONSTIPATION: 0
BLOOD IN STOOL: 0
DIARRHEA: 0
VOMITING: 0
COLOR CHANGE: 0

## 2024-01-09 NOTE — PROGRESS NOTES
Conjunctivae normal.      Pupils: Pupils are equal, round, and reactive to light.   Cardiovascular:      Rate and Rhythm: Normal rate and regular rhythm.      Heart sounds: No murmur heard.     No friction rub. No gallop.   Pulmonary:      Effort: Pulmonary effort is normal.      Breath sounds: Normal breath sounds.   Abdominal:      General: Bowel sounds are normal. There is no distension.      Palpations: Abdomen is soft. There is no mass.      Tenderness: There is no abdominal tenderness. There is no guarding or rebound.   Musculoskeletal:         General: No swelling, tenderness or deformity.      Cervical back: Neck supple. No tenderness.   Lymphadenopathy:      Cervical: No cervical adenopathy.   Skin:     General: Skin is warm.      Coloration: Skin is not pale.      Findings: No rash.   Neurological:      Mental Status: He is alert.      Comments: Occ difficulty finding words          Assessment:  Hank was seen today for diabetes.    Diagnoses and all orders for this visit:    Diabetes mellitus without complication (HCC)  Comments:  Pretty well controlled with A1C  at 7.1, discussed dietary restrictions and need to monitor bs at home.   Orders:  -     blood glucose monitor kit and supplies; Dispense sufficient amount for indicated testing frequency plus additional to accommodate PRN testing needs.  -     blood glucose monitor strips; Test once times a day & as needed for symptoms of irregular blood glucose. Dispense sufficient amount for indicated testing frequency    Primary hypertension  Comments:  Controlled on med, cont same.     Type 2 diabetes mellitus without complication, without long-term current use of insulin (HCC)    Serum lipids high  Comments:  controlled on statin and low chol diet, cont    Mild cognitive impairment  Comments:  Tolersting Namenda, continue.  Discussed coping mechanisms.     Other orders  -     losartan (COZAAR) 100 MG tablet; Take 1 tablet by mouth daily  -     metFORMIN

## 2024-03-04 LAB — DIABETIC RETINOPATHY: NEGATIVE

## 2024-03-26 ENCOUNTER — OFFICE VISIT (OUTPATIENT)
Dept: PRIMARY CARE CLINIC | Age: 74
End: 2024-03-26

## 2024-03-26 VITALS
HEIGHT: 68 IN | DIASTOLIC BLOOD PRESSURE: 66 MMHG | OXYGEN SATURATION: 98 % | WEIGHT: 195 LBS | HEART RATE: 75 BPM | TEMPERATURE: 97.7 F | BODY MASS INDEX: 29.55 KG/M2 | SYSTOLIC BLOOD PRESSURE: 122 MMHG

## 2024-03-26 DIAGNOSIS — E78.5 SERUM LIPIDS HIGH: ICD-10-CM

## 2024-03-26 DIAGNOSIS — G31.84 MILD COGNITIVE IMPAIRMENT: ICD-10-CM

## 2024-03-26 DIAGNOSIS — E11.9 TYPE 2 DIABETES MELLITUS WITHOUT COMPLICATION, WITHOUT LONG-TERM CURRENT USE OF INSULIN (HCC): ICD-10-CM

## 2024-03-26 DIAGNOSIS — I10 PRIMARY HYPERTENSION: Primary | ICD-10-CM

## 2024-03-26 RX ORDER — LOSARTAN POTASSIUM 100 MG/1
100 TABLET ORAL DAILY
Qty: 90 TABLET | Refills: 0 | Status: SHIPPED | OUTPATIENT
Start: 2024-03-26

## 2024-03-26 RX ORDER — BLOOD SUGAR DIAGNOSTIC
100 STRIP MISCELLANEOUS 2 TIMES DAILY
Qty: 100 EACH | Refills: 2 | Status: SHIPPED | OUTPATIENT
Start: 2024-03-26

## 2024-03-26 RX ORDER — CLOTRIMAZOLE AND BETAMETHASONE DIPROPIONATE 10; .64 MG/G; MG/G
CREAM TOPICAL
Qty: 45 G | Refills: 2 | Status: SHIPPED | OUTPATIENT
Start: 2024-03-26

## 2024-03-26 ASSESSMENT — ENCOUNTER SYMPTOMS
NAUSEA: 0
SORE THROAT: 0
BLOOD IN STOOL: 0
CONSTIPATION: 0
SINUS PRESSURE: 0
DIARRHEA: 0
ABDOMINAL PAIN: 0
ALLERGIC/IMMUNOLOGIC NEGATIVE: 1
COLOR CHANGE: 0
TROUBLE SWALLOWING: 0
ABDOMINAL DISTENTION: 0
BACK PAIN: 0
VOMITING: 0
RHINORRHEA: 0

## 2024-03-26 NOTE — PROGRESS NOTES
Hank Julio presents today for follow up of HTN, Diabetes, Mild Cognitive impairment    Current Outpatient Medications   Medication Sig Dispense Refill    losartan (COZAAR) 100 MG tablet Take 1 tablet by mouth daily 90 tablet 0    metFORMIN (GLUCOPHAGE) 1000 MG tablet Take 1 tablet by mouth 2 times daily (with meals) 180 tablet 0    memantine (NAMENDA) 5 MG tablet Take 1 tablet by mouth daily 90 tablet 3    aspirin 81 MG chewable tablet Take 1 tablet by mouth daily 90 tablet 3    ACCU-CHEK GUIDE strip 100 each by Does not apply route 3 times daily 100 each 2    diclofenac (VOLTAREN) 50 MG EC tablet TAKE 1 TABLET BY MOUTH TWICE DAILY AS NEEDED 60 tablet 0    clotrimazole-betamethasone (LOTRISONE) 1-0.05 % cream Apply topically 2 times daily. 45 g 2    blood glucose monitor kit and supplies Dispense sufficient amount for indicated testing frequency plus additional to accommodate PRN testing needs. 1 kit 0    blood glucose monitor strips Test once times a day & as needed for symptoms of irregular blood glucose. Dispense sufficient amount for indicated testing frequency (Patient not taking: Reported on 3/26/2024) 100 strip 2    simvastatin (ZOCOR) 20 MG tablet Take 1 tablet by mouth daily 90 tablet 0     No current facility-administered medications for this visit.      Past Medical History:   Diagnosis Date    Diabetes (HCC) 2002    High cholesterol     Hypertension 2015    Mild cognitive impairment     States forgets somethings was told is due to the sleep apnea \"That is whey I am having the surgery\"    DINO (obstructive sleep apnea)     Pneumonia due to COVID-19 virus 2022          Subjective:  AS above.  Overall doing well.  Admits has not been taking the Zocor. Watches diet.     Diabetes  Pertinent negatives for hypoglycemia include no dizziness, headaches, speech difficulty or tremors. Pertinent negatives for diabetes include no chest pain, no polydipsia, no polyuria and no weakness.      Review of

## 2024-06-10 DIAGNOSIS — Z12.5 PROSTATE CANCER SCREENING: ICD-10-CM

## 2024-06-10 DIAGNOSIS — E11.9 TYPE 2 DIABETES MELLITUS WITHOUT COMPLICATION, WITHOUT LONG-TERM CURRENT USE OF INSULIN (HCC): ICD-10-CM

## 2024-06-10 DIAGNOSIS — E11.9 TYPE 2 DIABETES MELLITUS WITHOUT COMPLICATION, WITHOUT LONG-TERM CURRENT USE OF INSULIN (HCC): Primary | ICD-10-CM

## 2024-06-10 LAB
ALBUMIN: 4.6 G/DL (ref 3.5–5.2)
ALP BLD-CCNC: 65 U/L (ref 40–129)
ALT SERPL-CCNC: 13 U/L (ref 0–40)
ANION GAP SERPL CALCULATED.3IONS-SCNC: 14 MMOL/L (ref 7–16)
AST SERPL-CCNC: 15 U/L (ref 0–39)
BILIRUB SERPL-MCNC: 0.3 MG/DL (ref 0–1.2)
BUN BLDV-MCNC: 19 MG/DL (ref 6–23)
CALCIUM SERPL-MCNC: 9.4 MG/DL (ref 8.6–10.2)
CHLORIDE BLD-SCNC: 100 MMOL/L (ref 98–107)
CHOLESTEROL, TOTAL: 185 MG/DL
CO2: 26 MMOL/L (ref 22–29)
CREAT SERPL-MCNC: 0.8 MG/DL (ref 0.7–1.2)
CREATININE URINE POCT: NORMAL
GFR, ESTIMATED: >90 ML/MIN/1.73M2
GLUCOSE BLD-MCNC: 132 MG/DL (ref 74–99)
HBA1C MFR BLD: 7.3 % (ref 4–5.6)
HDLC SERPL-MCNC: 44 MG/DL
LDL CHOLESTEROL: 121 MG/DL
MICROALBUMIN/CREAT 24H UR: NORMAL MG/DL
MICROALBUMIN/CREAT UR-RTO: NORMAL MG/G
POTASSIUM SERPL-SCNC: 4.1 MMOL/L (ref 3.5–5)
PROSTATE SPECIFIC ANTIGEN: 3.46 NG/ML (ref 0–4)
SODIUM BLD-SCNC: 140 MMOL/L (ref 132–146)
TOTAL PROTEIN: 7.5 G/DL (ref 6.4–8.3)
TRIGL SERPL-MCNC: 98 MG/DL
VLDLC SERPL CALC-MCNC: 20 MG/DL

## 2024-06-10 PROCEDURE — 82044 UR ALBUMIN SEMIQUANTITATIVE: CPT | Performed by: INTERNAL MEDICINE

## 2024-06-11 ENCOUNTER — TELEPHONE (OUTPATIENT)
Dept: PHARMACY | Facility: CLINIC | Age: 74
End: 2024-06-11

## 2024-06-11 ENCOUNTER — ENROLLMENT (OUTPATIENT)
Dept: PHARMACY | Facility: CLINIC | Age: 74
End: 2024-06-11

## 2024-06-11 NOTE — TELEPHONE ENCOUNTER
[facility] CLINICAL PHARMACY: STATIN THERAPY REVIEW  Identified statin use in persons with diabetes care gap per [employer]. Records dated: 6/11/24.    Last Office Visit: 3/26/24; 12/19/23 neurology    ASSESSMENT  STATIN GAP IDENTIFIED    Per chart review, patient is prescribed simvastatin 20 mg daily. No claims yet through [employer] insurance in 2024. Pt follows with both PCP and neurology, appears plan is to continue simvastatin. Of note pt reported LOV 3/26/24 he has not been taking the simvastatin, was advised to renew it. Believe he needs refill Rx    Per Reconciled Dispense Report:   SIMVASTATIN 20MG TABLETS 07/19/2023 90 90 each [facility]   SIMVASTATIN 20MG TABLETS 04/18/2023 90 90 each Opal Vega MD [facility] #...   SIMVASTATIN 20MG TABLETS 01/25/2023 90 90 each Opal Vega MD [facility] #...   0RR per hyperlink; last Rx 7/19/23 x 90 day supply 0 refills - believe needs refill Rx    Component      Latest Ref Rng 1/17/2023 1/8/2024 6/10/2024   Cholesterol, Total      <200 mg/dL 177  192  185    Triglycerides      <150 mg/dL 115  131  98    HDL Cholesterol      >40 mg/dL 43  44  44    LDL Cholesterol      <100 mg/dL 111 (H)  122 (H)  121 (H)    VLDL      mg/dL 23  26  20       Legend:  (H) High  ALT   Date Value Ref Range Status   06/10/2024 13 0 - 40 U/L Final     AST   Date Value Ref Range Status   06/10/2024 15 0 - 39 U/L Final       The ASCVD Risk score (Mian MOLINA, et al., 2019) failed to calculate for the following reasons:    Unable to determine if patient is Non-      Lab Results   Component Value Date    LABA1C 7.3 (H) 06/10/2024    LABA1C 7.1 (H) 01/08/2024    LABA1C 6.6 10/18/2023     PLAN  The following are interventions that have been identified:   - Patient identified as having SUPD gap  Prescribed simvastatin 20 mg daily - believe needs refill Rx  Of note pt reported last PCP OV he was not taking

## 2024-06-12 ENCOUNTER — OFFICE VISIT (OUTPATIENT)
Dept: PRIMARY CARE CLINIC | Age: 74
End: 2024-06-12
Payer: MEDICARE

## 2024-06-12 VITALS
OXYGEN SATURATION: 98 % | TEMPERATURE: 97.8 F | WEIGHT: 192 LBS | SYSTOLIC BLOOD PRESSURE: 160 MMHG | DIASTOLIC BLOOD PRESSURE: 80 MMHG | HEIGHT: 68 IN | HEART RATE: 70 BPM | BODY MASS INDEX: 29.1 KG/M2

## 2024-06-12 DIAGNOSIS — E78.5 SERUM LIPIDS HIGH: ICD-10-CM

## 2024-06-12 DIAGNOSIS — I10 PRIMARY HYPERTENSION: ICD-10-CM

## 2024-06-12 DIAGNOSIS — S39.012A BACK STRAIN, INITIAL ENCOUNTER: Primary | ICD-10-CM

## 2024-06-12 DIAGNOSIS — G31.84 MILD COGNITIVE IMPAIRMENT: ICD-10-CM

## 2024-06-12 DIAGNOSIS — E11.9 TYPE 2 DIABETES MELLITUS WITHOUT COMPLICATION, WITHOUT LONG-TERM CURRENT USE OF INSULIN (HCC): ICD-10-CM

## 2024-06-12 PROCEDURE — 3051F HG A1C>EQUAL 7.0%<8.0%: CPT | Performed by: INTERNAL MEDICINE

## 2024-06-12 PROCEDURE — G8427 DOCREV CUR MEDS BY ELIG CLIN: HCPCS | Performed by: INTERNAL MEDICINE

## 2024-06-12 PROCEDURE — G8417 CALC BMI ABV UP PARAM F/U: HCPCS | Performed by: INTERNAL MEDICINE

## 2024-06-12 PROCEDURE — 3077F SYST BP >= 140 MM HG: CPT | Performed by: INTERNAL MEDICINE

## 2024-06-12 PROCEDURE — 1036F TOBACCO NON-USER: CPT | Performed by: INTERNAL MEDICINE

## 2024-06-12 PROCEDURE — 99214 OFFICE O/P EST MOD 30 MIN: CPT | Performed by: INTERNAL MEDICINE

## 2024-06-12 PROCEDURE — 3079F DIAST BP 80-89 MM HG: CPT | Performed by: INTERNAL MEDICINE

## 2024-06-12 PROCEDURE — 2022F DILAT RTA XM EVC RTNOPTHY: CPT | Performed by: INTERNAL MEDICINE

## 2024-06-12 PROCEDURE — 3017F COLORECTAL CA SCREEN DOC REV: CPT | Performed by: INTERNAL MEDICINE

## 2024-06-12 PROCEDURE — 1123F ACP DISCUSS/DSCN MKR DOCD: CPT | Performed by: INTERNAL MEDICINE

## 2024-06-12 RX ORDER — SIMVASTATIN 20 MG
20 TABLET ORAL DAILY
Qty: 90 TABLET | Refills: 0 | Status: SHIPPED | OUTPATIENT
Start: 2024-06-12

## 2024-06-12 RX ORDER — LOSARTAN POTASSIUM 100 MG/1
100 TABLET ORAL DAILY
Qty: 90 TABLET | Refills: 0 | Status: SHIPPED | OUTPATIENT
Start: 2024-06-12

## 2024-06-12 SDOH — ECONOMIC STABILITY: INCOME INSECURITY: HOW HARD IS IT FOR YOU TO PAY FOR THE VERY BASICS LIKE FOOD, HOUSING, MEDICAL CARE, AND HEATING?: NOT HARD AT ALL

## 2024-06-12 SDOH — ECONOMIC STABILITY: FOOD INSECURITY: WITHIN THE PAST 12 MONTHS, THE FOOD YOU BOUGHT JUST DIDN'T LAST AND YOU DIDN'T HAVE MONEY TO GET MORE.: NEVER TRUE

## 2024-06-12 SDOH — ECONOMIC STABILITY: FOOD INSECURITY: WITHIN THE PAST 12 MONTHS, YOU WORRIED THAT YOUR FOOD WOULD RUN OUT BEFORE YOU GOT MONEY TO BUY MORE.: NEVER TRUE

## 2024-06-12 NOTE — PROGRESS NOTES
Hank Julio presents today for follow up of Diabetes, HTN, Mild Cognitive Impairment.     Current Outpatient Medications   Medication Sig Dispense Refill    diclofenac (VOLTAREN) 50 MG EC tablet Take 1 tablet by mouth 2 times daily as needed for Pain 180 tablet 0    losartan (COZAAR) 100 MG tablet Take 1 tablet by mouth daily 90 tablet 0    metFORMIN (GLUCOPHAGE) 1000 MG tablet Take 1 tablet by mouth 2 times daily (with meals) 180 tablet 0    simvastatin (ZOCOR) 20 MG tablet Take 1 tablet by mouth daily 90 tablet 0    memantine (NAMENDA) 10 MG tablet Take 1 tablet by mouth daily 90 tablet 3    ACCU-CHEK GUIDE strip 100 each by Does not apply route 2 times daily 100 each 2    clotrimazole-betamethasone (LOTRISONE) 1-0.05 % cream Apply topically 2 times daily. 45 g 2    aspirin 81 MG chewable tablet Take 1 tablet by mouth daily 90 tablet 3    blood glucose monitor kit and supplies Dispense sufficient amount for indicated testing frequency plus additional to accommodate PRN testing needs. 1 kit 0    blood glucose monitor strips Test once times a day & as needed for symptoms of irregular blood glucose. Dispense sufficient amount for indicated testing frequency (Patient not taking: Reported on 6/12/2024) 100 strip 2     No current facility-administered medications for this visit.      Past Medical History:   Diagnosis Date    Diabetes (HCC) 2002    High cholesterol     Hypertension 2015    Mild cognitive impairment     States forgets somethings was told is due to the sleep apnea \"That is whey I am having the surgery\"    DINO (obstructive sleep apnea)     Pneumonia due to COVID-19 virus 2022          Subjective:  Admits has been taking the statin only once a week. BP was elevated at the neurologist yesterday, at 190, taking bp med every day.  Namenda was increased to 10 by the neurologist.Low back pain, hx disc disease, would like some pt. Denies urinary sxs or fever.     Diabetes  Hypoglycemia symptoms include

## 2024-06-14 NOTE — TELEPHONE ENCOUNTER
Pt had 6/12/24 PCP OV - simvastatin refilled, Rx sent to [facility].    For Pharmacy Admin Tracking Only    Program: [employer]  CPA in place:  No  Gap Closed?: Yes   Time Spent (min): 10

## 2024-07-29 RX ORDER — LANCETS
EACH MISCELLANEOUS
Qty: 100 EACH | Refills: 2 | Status: SHIPPED | OUTPATIENT
Start: 2024-07-29

## 2024-09-13 RX ORDER — SIMVASTATIN 20 MG
20 TABLET ORAL DAILY
Qty: 90 TABLET | Refills: 0 | Status: SHIPPED | OUTPATIENT
Start: 2024-09-13

## 2024-09-24 ENCOUNTER — TELEPHONE (OUTPATIENT)
Dept: PRIMARY CARE CLINIC | Age: 74
End: 2024-09-24

## 2024-09-24 NOTE — TELEPHONE ENCOUNTER
Daughter requesting orders for labs be put in so that she can have them drawn. Please give her a call back at [phone] to let her know when they are in the system. Thank you.

## 2024-10-01 ENCOUNTER — OFFICE VISIT (OUTPATIENT)
Dept: PRIMARY CARE CLINIC | Age: 74
End: 2024-10-01

## 2024-10-01 VITALS
DIASTOLIC BLOOD PRESSURE: 80 MMHG | OXYGEN SATURATION: 97 % | SYSTOLIC BLOOD PRESSURE: 148 MMHG | HEART RATE: 84 BPM | BODY MASS INDEX: 29.4 KG/M2 | WEIGHT: 194 LBS | TEMPERATURE: 97.9 F | HEIGHT: 68 IN

## 2024-10-01 DIAGNOSIS — E11.9 TYPE 2 DIABETES MELLITUS WITHOUT COMPLICATION, WITHOUT LONG-TERM CURRENT USE OF INSULIN (HCC): ICD-10-CM

## 2024-10-01 DIAGNOSIS — I10 PRIMARY HYPERTENSION: Primary | ICD-10-CM

## 2024-10-01 DIAGNOSIS — G30.9 ALZHEIMER'S DISEASE, UNSPECIFIED (CODE) (HCC): ICD-10-CM

## 2024-10-01 DIAGNOSIS — E78.5 SERUM LIPIDS HIGH: ICD-10-CM

## 2024-10-01 RX ORDER — METRONIDAZOLE 7.5 MG/G
1 LOTION TOPICAL DAILY
Qty: 59 ML | Refills: 0 | Status: SHIPPED | OUTPATIENT
Start: 2024-10-01

## 2024-10-01 RX ORDER — MEMANTINE HYDROCHLORIDE 10 MG/1
10 TABLET ORAL 2 TIMES DAILY
Qty: 180 TABLET | Refills: 0 | Status: SHIPPED | OUTPATIENT
Start: 2024-10-01

## 2024-10-01 NOTE — PROGRESS NOTES
uncomfortable.  Family has noticed some increase in confusion and forgetfulness along with some crying spells. Pte is somewhat aware of his mental limitations and this makes him feel frustrated. BS remaining in the low hundreds.        Review of Systems   Constitutional:  Negative for activity change, appetite change and chills.   HENT:  Negative for congestion, ear pain, mouth sores, postnasal drip, rhinorrhea, sinus pressure, sneezing, sore throat and trouble swallowing.    Eyes:  Negative for visual disturbance.   Cardiovascular:  Negative for chest pain, palpitations and leg swelling.   Gastrointestinal:  Negative for abdominal distention, abdominal pain, blood in stool, constipation, diarrhea, nausea and vomiting.   Endocrine: Negative for cold intolerance, heat intolerance, polydipsia and polyuria.   Genitourinary:  Negative for difficulty urinating, dysuria, flank pain, frequency and urgency.   Musculoskeletal:  Negative for arthralgias, back pain, gait problem, neck pain and neck stiffness.   Skin:  Positive for rash. Negative for color change.   Allergic/Immunologic: Negative.    Neurological:  Negative for dizziness, tremors, speech difficulty, weakness, light-headedness and headaches.   Hematological: Negative.    Psychiatric/Behavioral:  The patient is nervous/anxious.         Crying spells, forgetful, confusion          Objective:  BP (!) 148/80 (Site: Left Upper Arm, Position: Sitting, Cuff Size: Medium Adult)   Pulse 84   Temp 97.9 °F (36.6 °C)   Ht 1.727 m (5' 8\")   Wt 88 kg (194 lb)   SpO2 97%   BMI 29.50 kg/m²      Physical Exam  Vitals reviewed.   Constitutional:       Appearance: He is obese.      Comments: Ambulates without devices, alert, pleasant   HENT:      Head: Normocephalic.      Right Ear: External ear normal. There is no impacted cerumen.      Left Ear: External ear normal. There is no impacted cerumen.      Nose: Nose normal.      Mouth/Throat:      Pharynx: Oropharynx is clear.

## 2024-10-02 ASSESSMENT — ENCOUNTER SYMPTOMS
SORE THROAT: 0
SINUS PRESSURE: 0
COLOR CHANGE: 0
RHINORRHEA: 0
BACK PAIN: 0
DIARRHEA: 0
CONSTIPATION: 0
NAUSEA: 0
TROUBLE SWALLOWING: 0
ABDOMINAL DISTENTION: 0
ABDOMINAL PAIN: 0
ALLERGIC/IMMUNOLOGIC NEGATIVE: 1
VOMITING: 0
BLOOD IN STOOL: 0

## 2024-10-23 RX ORDER — LOSARTAN POTASSIUM 100 MG/1
100 TABLET ORAL DAILY
Qty: 90 TABLET | Refills: 0 | Status: SHIPPED | OUTPATIENT
Start: 2024-10-23

## 2024-10-23 NOTE — TELEPHONE ENCOUNTER
Name of Medication(s) Requested:  Requested Prescriptions     Pending Prescriptions Disp Refills    losartan (COZAAR) 100 MG tablet [Pharmacy Med Name: LOSARTAN 100MG TABLETS] 90 tablet 0     Sig: TAKE 1 TABLET BY MOUTH DAILY       Medication is on current medication list Yes    Dosage and directions were verified? Yes    Quantity verified: 90 day supply     Pharmacy Verified?  Yes    Last Appointment:  10/1/2024    Future appts:  Future Appointments   Date Time Provider Department Center   11/26/2024  1:00 PM Leyla Gannon, LEAH - CNP [facility] NEURO Neurology -   12/17/2024  8:00 AM Opal Vega MD [facility]        (If no appt send self scheduling link. .REFILLAPPT)  Scheduling request sent?     [] Yes  [x] No    Does patient need updated?  [] Yes  [x] No

## 2024-10-30 NOTE — TELEPHONE ENCOUNTER
Name of Medication(s) Requested:  Requested Prescriptions     Pending Prescriptions Disp Refills    diclofenac (VOLTAREN) 50 MG EC tablet [Pharmacy Med Name: DICLOFENAC SODIUM 50MG DR TABLETS] 180 tablet 0     Sig: TAKE 1 TABLET BY MOUTH TWICE DAILY AS NEEDED FOR PAIN       Medication is on current medication list Yes    Dosage and directions were verified? Yes    Quantity verified: 90 day supply     Pharmacy Verified?  Yes    Last Appointment:  10/1/2024    Future appts:  Future Appointments   Date Time Provider Department Center   11/26/2024  1:00 PM Leyla Gannon APRN - CNP [facility] NEURO Neurology -   12/17/2024  8:00 AM Opal Vega MD [facility]        (If no appt send self scheduling link. .REFILLAPPT)  Scheduling request sent?     [] Yes  [x] No    Does patient need updated?  [] Yes  [x] No

## 2024-11-11 NOTE — TELEPHONE ENCOUNTER
Name of Medication(s) Requested:  Requested Prescriptions     Pending Prescriptions Disp Refills    metFORMIN (GLUCOPHAGE) 1000 MG tablet [Pharmacy Med Name: METFORMIN 1000MG TABLETS] 180 tablet 0     Sig: TAKE 1 TABLET BY MOUTH TWICE DAILY WITH MEALS       Medication is on current medication list Yes    Dosage and directions were verified? Yes    Quantity verified: 90 day supply     Pharmacy Verified?  Yes    Last Appointment:  10/1/2024    Future appts:  Future Appointments   Date Time Provider Department Center   11/26/2024  1:00 PM Leyla Gannon APRN - CNP [facility] NEURO Neurology -   12/17/2024  8:00 AM Opal Vega MD [facility]        (If no appt send self scheduling link. .REFILLAPPT)  Scheduling request sent?     [] Yes  [x] No    Does patient need updated?  [] Yes  [x] No

## 2024-12-04 RX ORDER — LOSARTAN POTASSIUM 100 MG/1
100 TABLET ORAL DAILY
Qty: 90 TABLET | Refills: 0 | Status: SHIPPED | OUTPATIENT
Start: 2024-12-04

## 2024-12-04 NOTE — TELEPHONE ENCOUNTER
Name of Medication(s) Requested:  Requested Prescriptions     Pending Prescriptions Disp Refills    losartan (COZAAR) 100 MG tablet [Pharmacy Med Name: LOSARTAN 100MG TABLETS] 90 tablet 0     Sig: TAKE 1 TABLET BY MOUTH DAILY       Medication is on current medication list Yes    Dosage and directions were verified? Yes    Quantity verified: 90 day supply     Pharmacy Verified?  Yes    Last Appointment:  10/1/2024    Future appts:  Future Appointments   Date Time Provider Department Center   1/28/2025  2:15 [facility]   5/29/2025  1:00 PM Leyla Gannon, LEAH - CNP [facility] NEURO Neurology -        (If no appt send self scheduling link. .REFILLAPPT)  Scheduling request sent?     [] Yes  [x] No    Does patient need updated?  [] Yes  [x] No

## 2024-12-19 ENCOUNTER — APPOINTMENT (OUTPATIENT)
Dept: GENERAL RADIOLOGY | Age: 74
End: 2024-12-19
Payer: MEDICARE

## 2024-12-19 ENCOUNTER — HOSPITAL ENCOUNTER (EMERGENCY)
Age: 74
Discharge: HOME | End: 2024-12-19
Payer: MEDICARE

## 2024-12-19 VITALS
SYSTOLIC BLOOD PRESSURE: 152 MMHG | TEMPERATURE: 98.1 F | DIASTOLIC BLOOD PRESSURE: 74 MMHG | HEART RATE: 72 BPM | OXYGEN SATURATION: 98 % | RESPIRATION RATE: 14 BRPM

## 2024-12-19 DIAGNOSIS — J06.9 ACUTE UPPER RESPIRATORY INFECTION: Primary | ICD-10-CM

## 2024-12-19 LAB
ALBUMIN SERPL-MCNC: 4.4 G/DL (ref 3.5–5.2)
ALP SERPL-CCNC: 77 U/L (ref 40–129)
ALT SERPL-CCNC: 23 U/L (ref 0–40)
ANION GAP SERPL CALCULATED.3IONS-SCNC: 9 MMOL/L (ref 7–16)
AST SERPL-CCNC: 19 U/L (ref 0–39)
BASOPHILS # BLD: 0.08 K/UL (ref 0–0.2)
BASOPHILS NFR BLD: 1 % (ref 0–2)
BILIRUB SERPL-MCNC: 0.2 MG/DL (ref 0–1.2)
BUN SERPL-MCNC: 20 MG/DL (ref 6–23)
CALCIUM SERPL-MCNC: 9.5 MG/DL (ref 8.6–10.2)
CHLORIDE SERPL-SCNC: 101 MMOL/L (ref 98–107)
CO2 SERPL-SCNC: 30 MMOL/L (ref 22–29)
CREAT SERPL-MCNC: 0.8 MG/DL (ref 0.7–1.2)
EOSINOPHIL # BLD: 0.21 K/UL (ref 0.05–0.5)
EOSINOPHILS RELATIVE PERCENT: 3 % (ref 0–6)
ERYTHROCYTE [DISTWIDTH] IN BLOOD BY AUTOMATED COUNT: 11.9 % (ref 11.5–15)
FLUAV RNA RESP QL NAA+PROBE: NOT DETECTED
FLUBV RNA RESP QL NAA+PROBE: NOT DETECTED
GFR, ESTIMATED: >90 ML/MIN/1.73M2
GLUCOSE SERPL-MCNC: 194 MG/DL (ref 74–99)
HCT VFR BLD AUTO: 38.2 % (ref 37–54)
HGB BLD-MCNC: 13 G/DL (ref 12.5–16.5)
IMM GRANULOCYTES # BLD AUTO: 0.13 K/UL (ref 0–0.58)
IMM GRANULOCYTES NFR BLD: 2 % (ref 0–5)
LACTATE BLDV-SCNC: 2 MMOL/L (ref 0.5–2.2)
LACTATE BLDV-SCNC: 2.5 MMOL/L (ref 0.5–2.2)
LYMPHOCYTES NFR BLD: 1.68 K/UL (ref 1.5–4)
LYMPHOCYTES RELATIVE PERCENT: 21 % (ref 20–42)
MCH RBC QN AUTO: 31.4 PG (ref 26–35)
MCHC RBC AUTO-ENTMCNC: 34 G/DL (ref 32–34.5)
MCV RBC AUTO: 92.3 FL (ref 80–99.9)
MONOCYTES NFR BLD: 0.72 K/UL (ref 0.1–0.95)
MONOCYTES NFR BLD: 9 % (ref 2–12)
NEUTROPHILS NFR BLD: 64 % (ref 43–80)
NEUTS SEG NFR BLD: 5.09 K/UL (ref 1.8–7.3)
PLATELET # BLD AUTO: 298 K/UL (ref 130–450)
PMV BLD AUTO: 9.5 FL (ref 7–12)
POTASSIUM SERPL-SCNC: 4.3 MMOL/L (ref 3.5–5)
PROT SERPL-MCNC: 7.4 G/DL (ref 6.4–8.3)
RBC # BLD AUTO: 4.14 M/UL (ref 3.8–5.8)
SARS-COV-2 RNA RESP QL NAA+PROBE: NOT DETECTED
SODIUM SERPL-SCNC: 140 MMOL/L (ref 132–146)
SOURCE: NORMAL
SPECIMEN DESCRIPTION: NORMAL
SPECIMEN SOURCE: NORMAL
STREP A, MOLECULAR: NEGATIVE
TROPONIN I SERPL HS-MCNC: 10 NG/L (ref 0–11)
WBC OTHER # BLD: 7.9 K/UL (ref 4.5–11.5)

## 2024-12-19 PROCEDURE — 85025 COMPLETE CBC W/AUTO DIFF WBC: CPT

## 2024-12-19 PROCEDURE — 80053 COMPREHEN METABOLIC PANEL: CPT

## 2024-12-19 PROCEDURE — 2580000003 HC RX 258: Performed by: NURSE PRACTITIONER

## 2024-12-19 PROCEDURE — 87636 SARSCOV2 & INF A&B AMP PRB: CPT

## 2024-12-19 PROCEDURE — 99285 EMERGENCY DEPT VISIT HI MDM: CPT

## 2024-12-19 PROCEDURE — 87651 STREP A DNA AMP PROBE: CPT

## 2024-12-19 PROCEDURE — 96361 HYDRATE IV INFUSION ADD-ON: CPT

## 2024-12-19 PROCEDURE — 84484 ASSAY OF TROPONIN QUANT: CPT

## 2024-12-19 PROCEDURE — 96360 HYDRATION IV INFUSION INIT: CPT

## 2024-12-19 PROCEDURE — 93005 ELECTROCARDIOGRAM TRACING: CPT | Performed by: NURSE PRACTITIONER

## 2024-12-19 PROCEDURE — 83605 ASSAY OF LACTIC ACID: CPT

## 2024-12-19 PROCEDURE — 71046 X-RAY EXAM CHEST 2 VIEWS: CPT

## 2024-12-19 RX ORDER — 0.9 % SODIUM CHLORIDE 0.9 %
1000 INTRAVENOUS SOLUTION INTRAVENOUS ONCE
Status: COMPLETED | OUTPATIENT
Start: 2024-12-19 | End: 2024-12-19

## 2024-12-19 RX ORDER — AZITHROMYCIN 250 MG/1
TABLET, FILM COATED ORAL
Qty: 1 PACKET | Refills: 0 | Status: SHIPPED | OUTPATIENT
Start: 2024-12-19 | End: 2024-12-23

## 2024-12-19 RX ORDER — ALBUTEROL SULFATE 90 UG/1
2 INHALANT RESPIRATORY (INHALATION) 4 TIMES DAILY PRN
Qty: 18 G | Refills: 0 | Status: SHIPPED | OUTPATIENT
Start: 2024-12-19

## 2024-12-19 RX ADMIN — SODIUM CHLORIDE 1000 ML: 9 INJECTION, SOLUTION INTRAVENOUS at 15:11

## 2024-12-19 NOTE — ED PROVIDER NOTES
changes  Q Waves: none    Clinical Impression: 1st degree AV block    Ney Solorio, APRN - NP   ED COURSE   Vitals:    Vitals:    12/19/24 1241 12/19/24 1300 12/19/24 1734   BP:  (!) 181/101 (!) 152/74   Pulse: 79  72   Resp:  18 14   Temp: 97.6 °F (36.4 °C)  98.1 °F (36.7 °C)   SpO2: 98%  98%       Patient was given the following medications:  Medications   sodium chloride 0.9 % bolus 1,000 mL (0 mLs IntraVENous Stopped 12/19/24 1742)          PROCEDURES   none    REASSESSMENT   12/19/24       Time: 1730  Patient’s condition is improving after treatment and has improved and is mildly symptomatic .    CONSULTS:  None    DIFFERENTIAL DX_MDM   MDM:   Social Determinants: None    Records Reviewed: Source, electronic medical record    CC/HPI Summary, DDx, ED Course, Impression: Patient presents with Cough and Chest Congestion (Patient c/o cough with chest congestion and elisabet SOB for almost 2 weeks . States he has been taking tylenol for fever . )   Hank Julio is a 74 y.o. male who presents to the ED with complaints of cough, chest congestion and shortness of breath for 2 weeks.  Patient states fevers and chills.  Patient denies nausea, vomiting, or diarrhea.  Patient denies abdominal pain.  Patient states chest pressure.  Patient states history of sleep apnea.  Patient denies sore throat.  Patient denies headache.  Patient states history of COVID approximately 3 years ago.  Patient denies cardiac history.  Patient denies history of pulmonary embolism.  Patient with a history of diabetes.    Patient electrolytes within normal limits other than a slightly elevated glucose at 194.  Patient troponin within normal limits at 10.  Lactic acid elevated at 2.5.  Patient CBC without leukocytosis or anemia.  Influenza A, influenza B, and COVID-19 test are negative.  Chest x-ray shows no acute process.  EKG shows sinus rhythm with first-degree AV block, left axis deviation.  Without ST elevations or depressions.  Repeat

## 2024-12-20 LAB
EKG ATRIAL RATE: 71 BPM
EKG P AXIS: 65 DEGREES
EKG P-R INTERVAL: 232 MS
EKG Q-T INTERVAL: 374 MS
EKG QRS DURATION: 100 MS
EKG QTC CALCULATION (BAZETT): 406 MS
EKG R AXIS: -34 DEGREES
EKG T AXIS: 74 DEGREES
EKG VENTRICULAR RATE: 71 BPM

## 2024-12-20 PROCEDURE — 93010 ELECTROCARDIOGRAM REPORT: CPT | Performed by: INTERNAL MEDICINE

## 2024-12-29 DIAGNOSIS — G30.9 ALZHEIMER'S DISEASE, UNSPECIFIED (CODE) (HCC): ICD-10-CM

## 2024-12-30 RX ORDER — MEMANTINE HYDROCHLORIDE 10 MG/1
10 TABLET ORAL 2 TIMES DAILY
Qty: 180 TABLET | OUTPATIENT
Start: 2024-12-30

## 2025-01-24 ENCOUNTER — NURSE ONLY (OUTPATIENT)
Dept: PRIMARY CARE CLINIC | Age: 75
End: 2025-01-24
Payer: MEDICARE

## 2025-01-24 DIAGNOSIS — E11.9 TYPE 2 DIABETES MELLITUS WITHOUT COMPLICATION, WITHOUT LONG-TERM CURRENT USE OF INSULIN (HCC): ICD-10-CM

## 2025-01-24 DIAGNOSIS — E78.5 SERUM LIPIDS HIGH: ICD-10-CM

## 2025-01-24 LAB
ALBUMIN: 4.5 G/DL (ref 3.5–5.2)
ALP BLD-CCNC: 74 U/L (ref 40–129)
ALT SERPL-CCNC: 13 U/L (ref 0–40)
ANION GAP SERPL CALCULATED.3IONS-SCNC: 15 MMOL/L (ref 7–16)
AST SERPL-CCNC: 21 U/L (ref 0–39)
BILIRUB SERPL-MCNC: 0.4 MG/DL (ref 0–1.2)
BUN BLDV-MCNC: 19 MG/DL (ref 6–23)
CALCIUM SERPL-MCNC: 9.5 MG/DL (ref 8.6–10.2)
CHLORIDE BLD-SCNC: 98 MMOL/L (ref 98–107)
CHOLESTEROL, TOTAL: 153 MG/DL
CO2: 23 MMOL/L (ref 22–29)
CREAT SERPL-MCNC: 0.7 MG/DL (ref 0.7–1.2)
GFR, ESTIMATED: >90 ML/MIN/1.73M2
GLUCOSE FASTING: 158 MG/DL (ref 74–99)
HBA1C MFR BLD: 7.3 % (ref 4–5.6)
HDLC SERPL-MCNC: 47 MG/DL
LDL CHOLESTEROL: 90 MG/DL
POTASSIUM SERPL-SCNC: 4.8 MMOL/L (ref 3.5–5)
SODIUM BLD-SCNC: 136 MMOL/L (ref 132–146)
TOTAL PROTEIN: 7.8 G/DL (ref 6.4–8.3)
TRIGL SERPL-MCNC: 79 MG/DL
VLDLC SERPL CALC-MCNC: 16 MG/DL

## 2025-01-24 PROCEDURE — 99999 PR OFFICE/OUTPT VISIT,PROCEDURE ONLY: CPT | Performed by: INTERNAL MEDICINE

## 2025-01-24 PROCEDURE — 36415 COLL VENOUS BLD VENIPUNCTURE: CPT | Performed by: INTERNAL MEDICINE

## 2025-01-28 ENCOUNTER — OFFICE VISIT (OUTPATIENT)
Dept: PRIMARY CARE CLINIC | Age: 75
End: 2025-01-28
Payer: MEDICARE

## 2025-01-28 VITALS
WEIGHT: 194 LBS | TEMPERATURE: 97.9 F | OXYGEN SATURATION: 97 % | HEART RATE: 70 BPM | SYSTOLIC BLOOD PRESSURE: 120 MMHG | BODY MASS INDEX: 29.4 KG/M2 | HEIGHT: 68 IN | DIASTOLIC BLOOD PRESSURE: 64 MMHG

## 2025-01-28 DIAGNOSIS — Z00.00 MEDICARE ANNUAL WELLNESS VISIT, SUBSEQUENT: Primary | ICD-10-CM

## 2025-01-28 DIAGNOSIS — G30.9 ALZHEIMER'S DISEASE, UNSPECIFIED (CODE) (HCC): ICD-10-CM

## 2025-01-28 PROCEDURE — 3017F COLORECTAL CA SCREEN DOC REV: CPT | Performed by: INTERNAL MEDICINE

## 2025-01-28 PROCEDURE — 3078F DIAST BP <80 MM HG: CPT | Performed by: INTERNAL MEDICINE

## 2025-01-28 PROCEDURE — 1160F RVW MEDS BY RX/DR IN RCRD: CPT | Performed by: INTERNAL MEDICINE

## 2025-01-28 PROCEDURE — 1159F MED LIST DOCD IN RCRD: CPT | Performed by: INTERNAL MEDICINE

## 2025-01-28 PROCEDURE — 3074F SYST BP LT 130 MM HG: CPT | Performed by: INTERNAL MEDICINE

## 2025-01-28 PROCEDURE — G0439 PPPS, SUBSEQ VISIT: HCPCS | Performed by: INTERNAL MEDICINE

## 2025-01-28 PROCEDURE — 1123F ACP DISCUSS/DSCN MKR DOCD: CPT | Performed by: INTERNAL MEDICINE

## 2025-01-28 RX ORDER — METRONIDAZOLE 7.5 MG/G
1 LOTION TOPICAL DAILY
Qty: 59 ML | Refills: 2 | Status: SHIPPED | OUTPATIENT
Start: 2025-01-28

## 2025-01-28 RX ORDER — MEMANTINE HYDROCHLORIDE 10 MG/1
10 TABLET ORAL DAILY
Qty: 90 TABLET | Refills: 0 | Status: SHIPPED | OUTPATIENT
Start: 2025-01-28

## 2025-01-28 RX ORDER — SIMVASTATIN 20 MG
20 TABLET ORAL DAILY
Qty: 90 TABLET | Refills: 0 | Status: SHIPPED | OUTPATIENT
Start: 2025-01-28

## 2025-01-28 RX ORDER — LOSARTAN POTASSIUM 100 MG/1
100 TABLET ORAL DAILY
Qty: 90 TABLET | Refills: 0 | Status: SHIPPED | OUTPATIENT
Start: 2025-01-28

## 2025-01-28 RX ORDER — CLOTRIMAZOLE AND BETAMETHASONE DIPROPIONATE 10; .64 MG/G; MG/G
CREAM TOPICAL
Qty: 45 G | Refills: 2 | Status: SHIPPED | OUTPATIENT
Start: 2025-01-28

## 2025-01-28 SDOH — ECONOMIC STABILITY: FOOD INSECURITY: WITHIN THE PAST 12 MONTHS, YOU WORRIED THAT YOUR FOOD WOULD RUN OUT BEFORE YOU GOT MONEY TO BUY MORE.: NEVER TRUE

## 2025-01-28 SDOH — ECONOMIC STABILITY: FOOD INSECURITY: WITHIN THE PAST 12 MONTHS, THE FOOD YOU BOUGHT JUST DIDN'T LAST AND YOU DIDN'T HAVE MONEY TO GET MORE.: NEVER TRUE

## 2025-01-28 ASSESSMENT — PATIENT HEALTH QUESTIONNAIRE - PHQ9
1. LITTLE INTEREST OR PLEASURE IN DOING THINGS: NOT AT ALL
SUM OF ALL RESPONSES TO PHQ9 QUESTIONS 1 & 2: 0
SUM OF ALL RESPONSES TO PHQ QUESTIONS 1-9: 0
2. FEELING DOWN, DEPRESSED OR HOPELESS: NOT AT ALL

## 2025-01-28 NOTE — PROGRESS NOTES
Medicare Annual Wellness Visit    Hank Julio is here for Medicare AWV    Assessment & Plan   Medicare annual wellness visit, subsequent  Alzheimer's disease, unspecified (CODE) (HCC)  The following orders have not been finalized:  -     memantine (NAMENDA) 10 MG tablet       Return for Medicare Annual Wellness Visit in 1 year.     Subjective       Patient's complete Health Risk Assessment and screening values have been reviewed and are found in Flowsheets. The following problems were reviewed today and where indicated follow up appointments were made and/or referrals ordered.    Positive Risk Factor Screenings with Interventions:    Fall Risk:  Do you feel unsteady or are you worried about falling? : (!) yes  2 or more falls in past year?: no  Fall with injury in past year?: no     Interventions:    Patient comments: Is very careful when he walks, no falls  Reviewed medications, home hazards, visual acuity, and co-morbidities that can increase risk for falls            General HRA Questions:  Select all that apply: (!) New or Increased Pain, Social Isolation    Interventions - Pain:  Patient comments: Had flare up of disc pain, better now.   Interventions - Social Isolation:  Patient comments: Has a lot of close family      Inactivity:  On average, how many days per week do you engage in moderate to strenuous exercise (like a brisk walk)?: 0 days (winter nothing) (!) Abnormal  On average, how many minutes do you engage in exercise at this level?: 0 min    Interventions:  Patient comments: Will try walking inside the house due to cold weather      Dentist Screen:  Have you seen the dentist within the past year?: (!) No    Intervention:  Patient comments: Will make appt      Safety:  Do you have any tripping hazards - loose or unsecured carpets or rugs?: (!) Yes  Do you have non-slip mats or non-slip surfaces or shower bars or grab bars in your shower or bathtub?: (!) No    Interventions:  Patient

## 2025-01-28 NOTE — PATIENT INSTRUCTIONS
you care for has sensitive teeth.  How do you brush and floss someone's teeth?  If the person you are caring for has a hard time cleaning their teeth on their own, you may need to brush and floss their teeth for them. It may be easiest to have the person sit and face away from you, and to sit or stand behind them. That way you can steady their head against your arm as you reach around to floss and brush their teeth. Choose a place that has good lighting and is comfortable for both of you.  Before you begin, gather your supplies. You will need gloves, floss, a toothbrush, and a container to hold water if you are not near a sink. Wash and dry your hands well and put on gloves. Start by flossing:  Gently work a piece of floss between each of the teeth toward the gums. A plastic flossing tool may make this easier, and they are available at most [facility].  Curve the floss around each tooth into a U-shape and gently slide it under the gum line.  Move the floss firmly up and down several times to scrape off the plaque.  After you've finished flossing, throw away the used floss and begin brushing:  Wet the brush and apply toothpaste.  Place the brush at a 45-degree angle where the teeth meet the gums. Press firmly, and move the brush in small circles over the surface of the teeth.  Be careful not to brush too hard. Vigorous brushing can make the gums pull away from the teeth and can scratch the tooth enamel.  Brush all surfaces of the teeth, on the tongue side and on the cheek side. Pay special attention to the front teeth and all surfaces of the back teeth.  Brush chewing surfaces with short back-and-forth strokes.  After you've finished, help the person rinse the remaining toothpaste from their mouth.  Where can you learn more?  Go to https://www.healthwise.net/patientEd and enter F944 to learn more about \"Learning About Dental Care for Older Adults.\"  Current as of: July 31, 2024  Content Version: 14.3  © 2024 [employer]

## 2025-03-31 ENCOUNTER — HOSPITAL ENCOUNTER (EMERGENCY)
Age: 75
Discharge: HOME | End: 2025-03-31
Attending: STUDENT IN AN ORGANIZED HEALTH CARE EDUCATION/TRAINING PROGRAM
Payer: MEDICARE

## 2025-03-31 ENCOUNTER — APPOINTMENT (OUTPATIENT)
Dept: CT IMAGING | Age: 75
End: 2025-03-31
Attending: STUDENT IN AN ORGANIZED HEALTH CARE EDUCATION/TRAINING PROGRAM
Payer: MEDICARE

## 2025-03-31 ENCOUNTER — APPOINTMENT (OUTPATIENT)
Dept: GENERAL RADIOLOGY | Age: 75
End: 2025-03-31
Payer: MEDICARE

## 2025-03-31 VITALS
WEIGHT: 190 LBS | OXYGEN SATURATION: 98 % | DIASTOLIC BLOOD PRESSURE: 84 MMHG | TEMPERATURE: 97.8 F | SYSTOLIC BLOOD PRESSURE: 191 MMHG | BODY MASS INDEX: 28.89 KG/M2 | RESPIRATION RATE: 18 BRPM | HEART RATE: 67 BPM

## 2025-03-31 DIAGNOSIS — R20.2 PARESTHESIA: Primary | ICD-10-CM

## 2025-03-31 DIAGNOSIS — R03.0 ELEVATED BLOOD PRESSURE READING: ICD-10-CM

## 2025-03-31 LAB
ALBUMIN SERPL-MCNC: 4.8 G/DL (ref 3.5–5.2)
ALP SERPL-CCNC: 79 U/L (ref 40–129)
ALT SERPL-CCNC: 24 U/L (ref 0–40)
ANION GAP SERPL CALCULATED.3IONS-SCNC: 17 MMOL/L (ref 7–16)
AST SERPL-CCNC: 20 U/L (ref 0–39)
BASOPHILS # BLD: 0.12 K/UL (ref 0–0.2)
BASOPHILS NFR BLD: 1 % (ref 0–2)
BILIRUB SERPL-MCNC: 0.2 MG/DL (ref 0–1.2)
BUN SERPL-MCNC: 22 MG/DL (ref 6–23)
CALCIUM SERPL-MCNC: 9.6 MG/DL (ref 8.6–10.2)
CHLORIDE SERPL-SCNC: 99 MMOL/L (ref 98–107)
CO2 SERPL-SCNC: 21 MMOL/L (ref 22–29)
CREAT SERPL-MCNC: 0.7 MG/DL (ref 0.7–1.2)
EKG ATRIAL RATE: 73 BPM
EKG P AXIS: 69 DEGREES
EKG P-R INTERVAL: 262 MS
EKG Q-T INTERVAL: 398 MS
EKG QRS DURATION: 98 MS
EKG QTC CALCULATION (BAZETT): 438 MS
EKG R AXIS: -40 DEGREES
EKG T AXIS: 96 DEGREES
EKG VENTRICULAR RATE: 73 BPM
EOSINOPHIL # BLD: 0.21 K/UL (ref 0.05–0.5)
EOSINOPHILS RELATIVE PERCENT: 2 % (ref 0–6)
ERYTHROCYTE [DISTWIDTH] IN BLOOD BY AUTOMATED COUNT: 12 % (ref 11.5–15)
GFR, ESTIMATED: >90 ML/MIN/1.73M2
GLUCOSE SERPL-MCNC: 149 MG/DL (ref 74–99)
HCT VFR BLD AUTO: 40.8 % (ref 37–54)
HGB BLD-MCNC: 13.8 G/DL (ref 12.5–16.5)
IMM GRANULOCYTES # BLD AUTO: 0.07 K/UL (ref 0–0.58)
IMM GRANULOCYTES NFR BLD: 1 % (ref 0–5)
LYMPHOCYTES NFR BLD: 1.78 K/UL (ref 1.5–4)
LYMPHOCYTES RELATIVE PERCENT: 21 % (ref 20–42)
MAGNESIUM SERPL-MCNC: 1.8 MG/DL (ref 1.6–2.6)
MCH RBC QN AUTO: 31 PG (ref 26–35)
MCHC RBC AUTO-ENTMCNC: 33.8 G/DL (ref 32–34.5)
MCV RBC AUTO: 91.7 FL (ref 80–99.9)
MONOCYTES NFR BLD: 0.78 K/UL (ref 0.1–0.95)
MONOCYTES NFR BLD: 9 % (ref 2–12)
NEUTROPHILS NFR BLD: 66 % (ref 43–80)
NEUTS SEG NFR BLD: 5.69 K/UL (ref 1.8–7.3)
PLATELET # BLD AUTO: 295 K/UL (ref 130–450)
PMV BLD AUTO: 10 FL (ref 7–12)
POTASSIUM SERPL-SCNC: 4.4 MMOL/L (ref 3.5–5)
PROT SERPL-MCNC: 7.7 G/DL (ref 6.4–8.3)
RBC # BLD AUTO: 4.45 M/UL (ref 3.8–5.8)
SODIUM SERPL-SCNC: 137 MMOL/L (ref 132–146)
TROPONIN I SERPL HS-MCNC: 10 NG/L (ref 0–11)
WBC OTHER # BLD: 8.7 K/UL (ref 4.5–11.5)

## 2025-03-31 PROCEDURE — 70450 CT HEAD/BRAIN W/O DYE: CPT

## 2025-03-31 PROCEDURE — 99285 EMERGENCY DEPT VISIT HI MDM: CPT

## 2025-03-31 PROCEDURE — 71045 X-RAY EXAM CHEST 1 VIEW: CPT

## 2025-03-31 PROCEDURE — 93010 ELECTROCARDIOGRAM REPORT: CPT | Performed by: INTERNAL MEDICINE

## 2025-03-31 PROCEDURE — 84484 ASSAY OF TROPONIN QUANT: CPT

## 2025-03-31 PROCEDURE — 80053 COMPREHEN METABOLIC PANEL: CPT

## 2025-03-31 PROCEDURE — 85025 COMPLETE CBC W/AUTO DIFF WBC: CPT

## 2025-03-31 PROCEDURE — 2580000003 HC RX 258: Performed by: STUDENT IN AN ORGANIZED HEALTH CARE EDUCATION/TRAINING PROGRAM

## 2025-03-31 PROCEDURE — 83735 ASSAY OF MAGNESIUM: CPT

## 2025-03-31 PROCEDURE — 93005 ELECTROCARDIOGRAM TRACING: CPT | Performed by: STUDENT IN AN ORGANIZED HEALTH CARE EDUCATION/TRAINING PROGRAM

## 2025-03-31 RX ORDER — 0.9 % SODIUM CHLORIDE 0.9 %
1000 INTRAVENOUS SOLUTION INTRAVENOUS ONCE
Status: COMPLETED | OUTPATIENT
Start: 2025-03-31 | End: 2025-03-31

## 2025-03-31 RX ORDER — HYDRALAZINE HYDROCHLORIDE 20 MG/ML
10 INJECTION INTRAMUSCULAR; INTRAVENOUS ONCE
Status: DISCONTINUED | OUTPATIENT
Start: 2025-03-31 | End: 2025-03-31 | Stop reason: HOSPADM

## 2025-03-31 RX ADMIN — SODIUM CHLORIDE 1000 ML: 0.9 INJECTION, SOLUTION INTRAVENOUS at 14:35

## 2025-03-31 ASSESSMENT — PAIN - FUNCTIONAL ASSESSMENT: PAIN_FUNCTIONAL_ASSESSMENT: NONE - DENIES PAIN

## 2025-03-31 NOTE — DISCHARGE INSTRUCTIONS
CT HEAD WO CONTRAST   Final Result   1. No acute intracranial abnormality.   2. Stable mild age-appropriate atrophy and mild small vessel ischemia.   3. Stable appearance of old lacunar infarcts in the mid parietal   periventricular centrum semiovale bilaterally.         XR CHEST PORTABLE   Final Result   No acute process.

## 2025-03-31 NOTE — ED PROVIDER NOTES
BMI 28.89 kg/m²   Oxygen Saturation Interpretation: Normal      ------------------------------------------ PROGRESS NOTES ------------------------------------------  9:09 PM EDT  I have spoken with the patient and family if present and discussed today’s results, in addition to providing specific details for the plan of care and counseling regarding the diagnosis and prognosis.  Their questions are answered at this time and they are agreeable with the plan. I discussed at length with them reasons for immediate return here for re evaluation. They will followup with their primary care provider by calling their office as soon as possible.      --------------------------------- ADDITIONAL PROVIDER NOTES ---------------------------------  At this time the patient is without objective evidence of an acute process requiring hospitalization or inpatient management.  They have remained hemodynamically stable throughout their entire ED visit and are stable for discharge with outpatient follow-up.     The plan has been discussed in detail and they are aware of the specific conditions for emergent return, as well as the importance of follow-up.      Discharge Medication List as of 3/31/2025  7:41 PM          Diagnosis:  1. Paresthesia    2. Elevated blood pressure reading        Disposition:  Patient's disposition: Discharge to home  Patient's condition is stable.                                               Ashish Clemente MD  03/31/25 5013

## 2025-04-02 ENCOUNTER — OFFICE VISIT (OUTPATIENT)
Dept: PRIMARY CARE CLINIC | Age: 75
End: 2025-04-02
Payer: MEDICARE

## 2025-04-02 VITALS
HEART RATE: 77 BPM | HEIGHT: 68 IN | SYSTOLIC BLOOD PRESSURE: 148 MMHG | BODY MASS INDEX: 29.55 KG/M2 | TEMPERATURE: 98.1 F | DIASTOLIC BLOOD PRESSURE: 70 MMHG | WEIGHT: 195 LBS | OXYGEN SATURATION: 97 %

## 2025-04-02 DIAGNOSIS — E11.9 TYPE 2 DIABETES MELLITUS WITHOUT COMPLICATION, WITHOUT LONG-TERM CURRENT USE OF INSULIN: ICD-10-CM

## 2025-04-02 DIAGNOSIS — I10 PRIMARY HYPERTENSION: Primary | ICD-10-CM

## 2025-04-02 DIAGNOSIS — R20.0 NUMBNESS AND TINGLING: ICD-10-CM

## 2025-04-02 DIAGNOSIS — R20.2 NUMBNESS AND TINGLING: ICD-10-CM

## 2025-04-02 DIAGNOSIS — G30.9 ALZHEIMER'S DISEASE, UNSPECIFIED (CODE): ICD-10-CM

## 2025-04-02 PROCEDURE — 1036F TOBACCO NON-USER: CPT | Performed by: INTERNAL MEDICINE

## 2025-04-02 PROCEDURE — 1123F ACP DISCUSS/DSCN MKR DOCD: CPT | Performed by: INTERNAL MEDICINE

## 2025-04-02 PROCEDURE — 2022F DILAT RTA XM EVC RTNOPTHY: CPT | Performed by: INTERNAL MEDICINE

## 2025-04-02 PROCEDURE — G8417 CALC BMI ABV UP PARAM F/U: HCPCS | Performed by: INTERNAL MEDICINE

## 2025-04-02 PROCEDURE — 3078F DIAST BP <80 MM HG: CPT | Performed by: INTERNAL MEDICINE

## 2025-04-02 PROCEDURE — 3017F COLORECTAL CA SCREEN DOC REV: CPT | Performed by: INTERNAL MEDICINE

## 2025-04-02 PROCEDURE — 3051F HG A1C>EQUAL 7.0%<8.0%: CPT | Performed by: INTERNAL MEDICINE

## 2025-04-02 PROCEDURE — 99214 OFFICE O/P EST MOD 30 MIN: CPT | Performed by: INTERNAL MEDICINE

## 2025-04-02 PROCEDURE — G8427 DOCREV CUR MEDS BY ELIG CLIN: HCPCS | Performed by: INTERNAL MEDICINE

## 2025-04-02 PROCEDURE — 3077F SYST BP >= 140 MM HG: CPT | Performed by: INTERNAL MEDICINE

## 2025-04-02 RX ORDER — SIMVASTATIN 20 MG
20 TABLET ORAL DAILY
Qty: 90 TABLET | Refills: 0 | Status: SHIPPED | OUTPATIENT
Start: 2025-04-02

## 2025-04-02 RX ORDER — METRONIDAZOLE 7.5 MG/G
1 LOTION TOPICAL DAILY
Qty: 59 ML | Refills: 2 | Status: SHIPPED | OUTPATIENT
Start: 2025-04-02

## 2025-04-02 RX ORDER — CLOTRIMAZOLE AND BETAMETHASONE DIPROPIONATE 10; .64 MG/G; MG/G
CREAM TOPICAL
Qty: 45 G | Refills: 2 | Status: SHIPPED | OUTPATIENT
Start: 2025-04-02

## 2025-04-02 RX ORDER — LOSARTAN POTASSIUM 100 MG/1
100 TABLET ORAL DAILY
Qty: 90 TABLET | Refills: 0 | Status: SHIPPED | OUTPATIENT
Start: 2025-04-02

## 2025-04-02 RX ORDER — AMLODIPINE BESYLATE 5 MG/1
5 TABLET ORAL DAILY
Qty: 90 TABLET | Refills: 0 | Status: SHIPPED | OUTPATIENT
Start: 2025-04-02

## 2025-04-02 RX ORDER — MEMANTINE HYDROCHLORIDE 10 MG/1
10 TABLET ORAL DAILY
Qty: 90 TABLET | Refills: 0 | Status: SHIPPED | OUTPATIENT
Start: 2025-04-02

## 2025-04-02 NOTE — PROGRESS NOTES
Hank Julio presents today for   Follow up of ER visit  Current Outpatient Medications   Medication Sig Dispense Refill    clotrimazole-betamethasone (LOTRISONE) 1-0.05 % cream Apply topically 2 times daily. 45 g 2    losartan (COZAAR) 100 MG tablet Take 1 tablet by mouth daily 90 tablet 0    memantine (NAMENDA) 10 MG tablet Take 1 tablet by mouth daily 90 tablet 0    metFORMIN (GLUCOPHAGE) 1000 MG tablet Take 1 tablet by mouth 2 times daily (with meals) 180 tablet 0    metroNIDAZOLE, TOPICAL, 0.75 % LOTN Apply 1 each topically daily 59 mL 2    simvastatin (ZOCOR) 20 MG tablet Take 1 tablet by mouth daily 90 tablet 0    amLODIPine (NORVASC) 5 MG tablet Take 1 tablet by mouth daily 90 tablet 0    aspirin 81 MG chewable tablet CHEW AND SWALLOW 1 TABLET BY MOUTH DAILY 90 tablet 3    albuterol sulfate HFA (VENTOLIN HFA) 108 (90 Base) MCG/ACT inhaler Inhale 2 puffs into the lungs 4 times daily as needed for Wheezing 18 g 0    Accu-Chek Softclix Lancets MISC TEST THREE TIMES DAILY 100 each 2    blood glucose monitor strips Test once times a day & as needed for symptoms of irregular blood glucose. Dispense sufficient amount for indicated testing frequency 100 strip 2     No current facility-administered medications for this visit.      Past Medical History:   Diagnosis Date    Diabetes (HCC) 2002    High cholesterol     Hypertension 2015    Mild cognitive impairment     States forgets somethings was told is due to the sleep apnea \"That is whey I am having the surgery\"    DINO (obstructive sleep apnea)     Pneumonia due to COVID-19 virus 2022          Subjective:  AS above.  Pte was in ER due to sensation of numbness/pins and needles around his moth and distally on rt hand.  Labs, EKG and brain CT were normal with non contributory physical exam. Was sent home with no new meds. denies any weakness, vision or speech impairment.  Taking all meds.        Review of Systems   Neurological:  Positive for numbness (and

## 2025-04-09 ENCOUNTER — OFFICE VISIT (OUTPATIENT)
Dept: PRIMARY CARE CLINIC | Age: 75
End: 2025-04-09
Payer: MEDICARE

## 2025-04-09 VITALS
SYSTOLIC BLOOD PRESSURE: 144 MMHG | DIASTOLIC BLOOD PRESSURE: 70 MMHG | TEMPERATURE: 98.1 F | HEIGHT: 68 IN | BODY MASS INDEX: 29.86 KG/M2 | HEART RATE: 69 BPM | WEIGHT: 197 LBS | OXYGEN SATURATION: 98 %

## 2025-04-09 DIAGNOSIS — I10 PRIMARY HYPERTENSION: Primary | ICD-10-CM

## 2025-04-09 DIAGNOSIS — E78.5 SERUM LIPIDS HIGH: ICD-10-CM

## 2025-04-09 DIAGNOSIS — G31.84 MILD COGNITIVE IMPAIRMENT: ICD-10-CM

## 2025-04-09 DIAGNOSIS — E11.9 TYPE 2 DIABETES MELLITUS WITHOUT COMPLICATION, WITHOUT LONG-TERM CURRENT USE OF INSULIN: ICD-10-CM

## 2025-04-09 PROCEDURE — 3077F SYST BP >= 140 MM HG: CPT | Performed by: INTERNAL MEDICINE

## 2025-04-09 PROCEDURE — 1159F MED LIST DOCD IN RCRD: CPT | Performed by: INTERNAL MEDICINE

## 2025-04-09 PROCEDURE — G8417 CALC BMI ABV UP PARAM F/U: HCPCS | Performed by: INTERNAL MEDICINE

## 2025-04-09 PROCEDURE — 1123F ACP DISCUSS/DSCN MKR DOCD: CPT | Performed by: INTERNAL MEDICINE

## 2025-04-09 PROCEDURE — 1036F TOBACCO NON-USER: CPT | Performed by: INTERNAL MEDICINE

## 2025-04-09 PROCEDURE — 3017F COLORECTAL CA SCREEN DOC REV: CPT | Performed by: INTERNAL MEDICINE

## 2025-04-09 PROCEDURE — 99214 OFFICE O/P EST MOD 30 MIN: CPT | Performed by: INTERNAL MEDICINE

## 2025-04-09 PROCEDURE — 3051F HG A1C>EQUAL 7.0%<8.0%: CPT | Performed by: INTERNAL MEDICINE

## 2025-04-09 PROCEDURE — 3078F DIAST BP <80 MM HG: CPT | Performed by: INTERNAL MEDICINE

## 2025-04-09 PROCEDURE — 2022F DILAT RTA XM EVC RTNOPTHY: CPT | Performed by: INTERNAL MEDICINE

## 2025-04-09 PROCEDURE — G8427 DOCREV CUR MEDS BY ELIG CLIN: HCPCS | Performed by: INTERNAL MEDICINE

## 2025-04-11 NOTE — PROGRESS NOTES
Hank Julio presents today for follow up of hTN, Mild Cognitive Impairment, Diabetes.     Current Outpatient Medications   Medication Sig Dispense Refill    clotrimazole-betamethasone (LOTRISONE) 1-0.05 % cream Apply topically 2 times daily. 45 g 2    losartan (COZAAR) 100 MG tablet Take 1 tablet by mouth daily 90 tablet 0    memantine (NAMENDA) 10 MG tablet Take 1 tablet by mouth daily 90 tablet 0    metFORMIN (GLUCOPHAGE) 1000 MG tablet Take 1 tablet by mouth 2 times daily (with meals) 180 tablet 0    metroNIDAZOLE, TOPICAL, 0.75 % LOTN Apply 1 each topically daily 59 mL 2    simvastatin (ZOCOR) 20 MG tablet Take 1 tablet by mouth daily 90 tablet 0    amLODIPine (NORVASC) 5 MG tablet Take 1 tablet by mouth daily 90 tablet 0    aspirin 81 MG chewable tablet CHEW AND SWALLOW 1 TABLET BY MOUTH DAILY 90 tablet 3    albuterol sulfate HFA (VENTOLIN HFA) 108 (90 Base) MCG/ACT inhaler Inhale 2 puffs into the lungs 4 times daily as needed for Wheezing 18 g 0    Accu-Chek Softclix Lancets MISC TEST THREE TIMES DAILY 100 each 2    blood glucose monitor strips Test once times a day & as needed for symptoms of irregular blood glucose. Dispense sufficient amount for indicated testing frequency 100 strip 2     No current facility-administered medications for this visit.      Past Medical History:   Diagnosis Date    Diabetes (HCC) 2002    High cholesterol     Hypertension 2015    Mild cognitive impairment     States forgets somethings was told is due to the sleep apnea \"That is whey I am having the surgery\"    DINO (obstructive sleep apnea)     Pneumonia due to COVID-19 virus 2022          Subjective:  Here with daughter and wife.  BP at home coming down though still mildly elevated since Norvasc added. Pte still slightly forgetful at times, this makes him upset.  On Namenda, sees neurology. Trying to follow Diabetic diet. Admits he took Diclofenac last 2 days due to back pain, was told before to avoid this med to

## 2025-05-21 ENCOUNTER — OFFICE VISIT (OUTPATIENT)
Dept: PRIMARY CARE CLINIC | Age: 75
End: 2025-05-21

## 2025-05-21 VITALS
DIASTOLIC BLOOD PRESSURE: 70 MMHG | BODY MASS INDEX: 29.55 KG/M2 | OXYGEN SATURATION: 97 % | HEART RATE: 64 BPM | HEIGHT: 68 IN | TEMPERATURE: 97.7 F | WEIGHT: 195 LBS | SYSTOLIC BLOOD PRESSURE: 130 MMHG

## 2025-05-21 DIAGNOSIS — G30.9 ALZHEIMER'S DISEASE, UNSPECIFIED (CODE) (HCC): ICD-10-CM

## 2025-05-21 DIAGNOSIS — M54.16 LUMBAR RADICULOPATHY: Primary | ICD-10-CM

## 2025-05-21 DIAGNOSIS — Z01.818 PRE-OP TESTING: ICD-10-CM

## 2025-05-21 LAB
ANION GAP SERPL CALCULATED.3IONS-SCNC: 13 MMOL/L (ref 7–16)
BUN BLDV-MCNC: 23 MG/DL (ref 8–23)
CALCIUM SERPL-MCNC: 9.4 MG/DL (ref 8.8–10.2)
CHLORIDE BLD-SCNC: 103 MMOL/L (ref 98–107)
CO2: 25 MMOL/L (ref 22–29)
CREAT SERPL-MCNC: 0.8 MG/DL (ref 0.7–1.2)
GFR, ESTIMATED: >90 ML/MIN/1.73M2
GLUCOSE BLD-MCNC: 179 MG/DL (ref 74–99)
POTASSIUM SERPL-SCNC: 5.1 MMOL/L (ref 3.5–5.1)
SODIUM BLD-SCNC: 141 MMOL/L (ref 136–145)

## 2025-05-21 RX ORDER — METRONIDAZOLE 7.5 MG/G
1 LOTION TOPICAL DAILY
Qty: 59 ML | Refills: 2 | Status: SHIPPED | OUTPATIENT
Start: 2025-05-21

## 2025-05-21 RX ORDER — SIMVASTATIN 20 MG
20 TABLET ORAL DAILY
Qty: 90 TABLET | Refills: 0 | Status: SHIPPED | OUTPATIENT
Start: 2025-05-21

## 2025-05-21 RX ORDER — AMLODIPINE BESYLATE 5 MG/1
5 TABLET ORAL DAILY
Qty: 90 TABLET | Refills: 0 | Status: SHIPPED | OUTPATIENT
Start: 2025-05-21

## 2025-05-21 RX ORDER — CLOTRIMAZOLE AND BETAMETHASONE DIPROPIONATE 10; .64 MG/G; MG/G
CREAM TOPICAL
Qty: 45 G | Refills: 2 | Status: SHIPPED | OUTPATIENT
Start: 2025-05-21

## 2025-05-21 RX ORDER — MEMANTINE HYDROCHLORIDE 10 MG/1
10 TABLET ORAL DAILY
Qty: 90 TABLET | Refills: 0 | Status: SHIPPED | OUTPATIENT
Start: 2025-05-21

## 2025-05-21 RX ORDER — LOSARTAN POTASSIUM 100 MG/1
100 TABLET ORAL DAILY
Qty: 90 TABLET | Refills: 0 | Status: SHIPPED | OUTPATIENT
Start: 2025-05-21

## 2025-05-21 ASSESSMENT — ENCOUNTER SYMPTOMS: BACK PAIN: 1

## 2025-05-21 NOTE — PROGRESS NOTES
Hank Julio presents today for low back pain    Current Outpatient Medications   Medication Sig Dispense Refill    amLODIPine (NORVASC) 5 MG tablet Take 1 tablet by mouth daily 90 tablet 0    clotrimazole-betamethasone (LOTRISONE) 1-0.05 % cream Apply topically 2 times daily. 45 g 2    losartan (COZAAR) 100 MG tablet Take 1 tablet by mouth daily 90 tablet 0    memantine (NAMENDA) 10 MG tablet Take 1 tablet by mouth daily 90 tablet 0    metFORMIN (GLUCOPHAGE) 1000 MG tablet Take 1 tablet by mouth 2 times daily (with meals) 180 tablet 0    metroNIDAZOLE, TOPICAL, 0.75 % LOTN Apply 1 each topically daily 59 mL 2    simvastatin (ZOCOR) 20 MG tablet Take 1 tablet by mouth daily 90 tablet 0    aspirin 81 MG chewable tablet CHEW AND SWALLOW 1 TABLET BY MOUTH DAILY 90 tablet 3    albuterol sulfate HFA (VENTOLIN HFA) 108 (90 Base) MCG/ACT inhaler Inhale 2 puffs into the lungs 4 times daily as needed for Wheezing 18 g 0    Accu-Chek Softclix Lancets MISC TEST THREE TIMES DAILY 100 each 2    blood glucose monitor strips Test once times a day & as needed for symptoms of irregular blood glucose. Dispense sufficient amount for indicated testing frequency 100 strip 2     No current facility-administered medications for this visit.      Past Medical History:   Diagnosis Date    Diabetes (HCC) 2002    High cholesterol     Hypertension 2015    Mild cognitive impairment     States forgets somethings was told is due to the sleep apnea \"That is whey I am having the surgery\"    DINO (obstructive sleep apnea)     Pneumonia due to COVID-19 virus 2022          Subjective:  Had Lumbar Laminectomy in [place] around 8 years ago. Was told he had a L4 herniation that could not be fixed at that time and eventually he would need surgery again. For quite a while has had deep pain in low mid back, irr to both legs.  Has severe pain in both legs when he first gets up to walk. No numbness or tingling. The only thing that helps his pain a little

## 2025-05-29 ENCOUNTER — OFFICE VISIT (OUTPATIENT)
Age: 75
End: 2025-05-29
Payer: MEDICARE

## 2025-05-29 VITALS
WEIGHT: 195 LBS | SYSTOLIC BLOOD PRESSURE: 136 MMHG | BODY MASS INDEX: 29.65 KG/M2 | TEMPERATURE: 97 F | DIASTOLIC BLOOD PRESSURE: 78 MMHG | OXYGEN SATURATION: 98 % | HEART RATE: 83 BPM

## 2025-05-29 DIAGNOSIS — G51.0 BELL'S PALSY: Primary | ICD-10-CM

## 2025-05-29 DIAGNOSIS — G30.9 ALZHEIMER'S DISEASE, UNSPECIFIED (CODE) (HCC): ICD-10-CM

## 2025-05-29 DIAGNOSIS — Z86.73 HISTORY OF STROKE: ICD-10-CM

## 2025-05-29 PROCEDURE — 1160F RVW MEDS BY RX/DR IN RCRD: CPT | Performed by: NURSE PRACTITIONER

## 2025-05-29 PROCEDURE — 3078F DIAST BP <80 MM HG: CPT | Performed by: NURSE PRACTITIONER

## 2025-05-29 PROCEDURE — 1036F TOBACCO NON-USER: CPT | Performed by: NURSE PRACTITIONER

## 2025-05-29 PROCEDURE — 1159F MED LIST DOCD IN RCRD: CPT | Performed by: NURSE PRACTITIONER

## 2025-05-29 PROCEDURE — 3075F SYST BP GE 130 - 139MM HG: CPT | Performed by: NURSE PRACTITIONER

## 2025-05-29 PROCEDURE — 1123F ACP DISCUSS/DSCN MKR DOCD: CPT | Performed by: NURSE PRACTITIONER

## 2025-05-29 PROCEDURE — G8417 CALC BMI ABV UP PARAM F/U: HCPCS | Performed by: NURSE PRACTITIONER

## 2025-05-29 PROCEDURE — G8427 DOCREV CUR MEDS BY ELIG CLIN: HCPCS | Performed by: NURSE PRACTITIONER

## 2025-05-29 PROCEDURE — 99214 OFFICE O/P EST MOD 30 MIN: CPT | Performed by: NURSE PRACTITIONER

## 2025-05-29 RX ORDER — MEMANTINE HYDROCHLORIDE 10 MG/1
10 TABLET ORAL 2 TIMES DAILY
Qty: 60 TABLET | Refills: 5 | Status: SHIPPED | OUTPATIENT
Start: 2025-05-29

## 2025-05-29 NOTE — PROGRESS NOTES
[facility]  NEUROLOGY  Rafael Gracia, DNP, APRN, ACNS-BC  Justin Gomez, MSN, APRN-FNP-C  Leyla Gannon, MSN, APRN-FNP-C  Alana Lopez, MSPAS, PA-C  Merry Sutton, MSN, APRN-FNP-C  [address] [id number]  Phone: [phone]  Fax: [phone]       Hank Julio is a 75 y.o. right handed male     Patient follows for Alzheimer's, history of stroke, Covid     Patient is Tuval[place] speaking     Onset was several years ago as he was seeing a Neurologist in [place].  He has noticed difficulty getting his words out and remembering things that were just told to him.  He has no issues with ADL's.  He is able to dress himself and care for himself.  He has gotten lost twice while driving.  He has forgotten his bank PIN number a few times which worries him.  His wife has noticed some memory loss as well.  He has not been started on any new medications.      He was recently seen by Sleep medicine and has been diagnosed with DINO. His report indicates that he does meet criteria for diagnosis of severe DINO syndrome with apnea/hypoapnea index.  He has a follow up appt with Sleep on 5/29/2021 and would benefit from CPAP titration and would avoid sedation on this patient.      He was started on Aricept by his PCP but patient could not tolerate and it was discontinued due to GI upset.      He had an upper airway stimulation implant to help with his sleep apnea.    Pt recently in ED for Bell's palsy he had some tongue heaviness and slurred speech while in [place].  Got worked up there and was told it was his sleep apnea mask. He came back to [place] and went to PCP who then sent him to ED for evaluation.  He did have a virus while in [place], URI.  He was worked up with CTH which was negative and given steroids and antiviral which he has already completed.  He continued to have tongue heaviness and some dysarthria. SLP swallow eval was completed and was essentially normal.  Cog

## 2025-06-16 ENCOUNTER — TELEPHONE (OUTPATIENT)
Dept: PRIMARY CARE CLINIC | Age: 75
End: 2025-06-16

## 2025-06-16 DIAGNOSIS — E11.9 TYPE 2 DIABETES MELLITUS WITHOUT COMPLICATION, WITHOUT LONG-TERM CURRENT USE OF INSULIN (HCC): Primary | ICD-10-CM

## 2025-06-16 DIAGNOSIS — I10 PRIMARY HYPERTENSION: ICD-10-CM

## 2025-06-16 NOTE — TELEPHONE ENCOUNTER
Lisandra from [facility] cat scan department called in stating the patient will need new orders put in. Patient is scheduled for a cat scan on 25 but the last BMP will  before that date.

## 2025-06-21 ENCOUNTER — HOSPITAL ENCOUNTER (INPATIENT)
Age: 75
LOS: 1 days | Discharge: HOME | DRG: 543 | End: 2025-06-26
Attending: EMERGENCY MEDICINE
Payer: MEDICARE

## 2025-06-21 DIAGNOSIS — M54.50 LUMBAR BACK PAIN: Primary | ICD-10-CM

## 2025-06-21 DIAGNOSIS — R93.7 ABNORMAL CT SCAN, LUMBAR SPINE: ICD-10-CM

## 2025-06-21 LAB
ALBUMIN SERPL-MCNC: 4.3 G/DL (ref 3.5–5.2)
ALP SERPL-CCNC: 73 U/L (ref 40–129)
ALT SERPL-CCNC: 24 U/L (ref 0–50)
ANION GAP SERPL CALCULATED.3IONS-SCNC: 13 MMOL/L (ref 7–16)
AST SERPL-CCNC: 19 U/L (ref 0–50)
BACTERIA URNS QL MICRO: ABNORMAL
BASOPHILS # BLD: 0.09 K/UL (ref 0–0.2)
BASOPHILS NFR BLD: 1 % (ref 0–2)
BILIRUB SERPL-MCNC: <0.2 MG/DL (ref 0–1.2)
BILIRUB UR QL STRIP: NEGATIVE
BUN SERPL-MCNC: 25 MG/DL (ref 8–23)
CALCIUM SERPL-MCNC: 9.1 MG/DL (ref 8.8–10.2)
CHLORIDE SERPL-SCNC: 103 MMOL/L (ref 98–107)
CLARITY UR: CLEAR
CO2 SERPL-SCNC: 27 MMOL/L (ref 22–29)
COLOR UR: YELLOW
CREAT SERPL-MCNC: 1.3 MG/DL (ref 0.7–1.2)
EOSINOPHIL # BLD: 0.13 K/UL (ref 0.05–0.5)
EOSINOPHILS RELATIVE PERCENT: 2 % (ref 0–6)
ERYTHROCYTE [DISTWIDTH] IN BLOOD BY AUTOMATED COUNT: 12.5 % (ref 11.5–15)
GFR, ESTIMATED: 59 ML/MIN/1.73M2
GLUCOSE SERPL-MCNC: 174 MG/DL (ref 74–99)
GLUCOSE UR STRIP-MCNC: 250 MG/DL
HCT VFR BLD AUTO: 37.7 % (ref 37–54)
HGB BLD-MCNC: 12.6 G/DL (ref 12.5–16.5)
HGB UR QL STRIP.AUTO: NEGATIVE
IMM GRANULOCYTES # BLD AUTO: 0.07 K/UL (ref 0–0.58)
IMM GRANULOCYTES NFR BLD: 1 % (ref 0–5)
KETONES UR STRIP-MCNC: 15 MG/DL
LEUKOCYTE ESTERASE UR QL STRIP: NEGATIVE
LYMPHOCYTES NFR BLD: 1.4 K/UL (ref 1.5–4)
LYMPHOCYTES RELATIVE PERCENT: 19 % (ref 20–42)
MCH RBC QN AUTO: 30.9 PG (ref 26–35)
MCHC RBC AUTO-ENTMCNC: 33.4 G/DL (ref 32–34.5)
MCV RBC AUTO: 92.4 FL (ref 80–99.9)
MONOCYTES NFR BLD: 0.68 K/UL (ref 0.1–0.95)
MONOCYTES NFR BLD: 9 % (ref 2–12)
NEUTROPHILS NFR BLD: 69 % (ref 43–80)
NEUTS SEG NFR BLD: 5.14 K/UL (ref 1.8–7.3)
NITRITE UR QL STRIP: NEGATIVE
PH UR STRIP: 7 [PH] (ref 5–8)
PLATELET # BLD AUTO: 270 K/UL (ref 130–450)
PMV BLD AUTO: 9.9 FL (ref 7–12)
POTASSIUM SERPL-SCNC: 4.7 MMOL/L (ref 3.5–5.1)
PROT SERPL-MCNC: 6.9 G/DL (ref 6.4–8.3)
PROT UR STRIP-MCNC: ABNORMAL MG/DL
RBC # BLD AUTO: 4.08 M/UL (ref 3.8–5.8)
RBC #/AREA URNS HPF: ABNORMAL /HPF
SODIUM SERPL-SCNC: 143 MMOL/L (ref 136–145)
SP GR UR STRIP: 1.01 (ref 1–1.03)
UROBILINOGEN UR STRIP-ACNC: 0.2 EU/DL (ref 0–1)
WBC #/AREA URNS HPF: ABNORMAL /HPF
WBC OTHER # BLD: 7.5 K/UL (ref 4.5–11.5)

## 2025-06-21 PROCEDURE — 80053 COMPREHEN METABOLIC PANEL: CPT

## 2025-06-21 PROCEDURE — 81001 URINALYSIS AUTO W/SCOPE: CPT

## 2025-06-21 PROCEDURE — 6360000002 HC RX W HCPCS: Performed by: EMERGENCY MEDICINE

## 2025-06-21 PROCEDURE — 84300 ASSAY OF URINE SODIUM: CPT

## 2025-06-21 PROCEDURE — 84540 ASSAY OF URINE/UREA-N: CPT

## 2025-06-21 PROCEDURE — 85025 COMPLETE CBC W/AUTO DIFF WBC: CPT

## 2025-06-21 PROCEDURE — 96375 TX/PRO/DX INJ NEW DRUG ADDON: CPT

## 2025-06-21 PROCEDURE — 82570 ASSAY OF URINE CREATININE: CPT

## 2025-06-21 PROCEDURE — 96372 THER/PROPH/DIAG INJ SC/IM: CPT

## 2025-06-21 PROCEDURE — 96374 THER/PROPH/DIAG INJ IV PUSH: CPT

## 2025-06-21 PROCEDURE — 99285 EMERGENCY DEPT VISIT HI MDM: CPT

## 2025-06-21 RX ORDER — ORPHENADRINE CITRATE 30 MG/ML
30 INJECTION INTRAMUSCULAR; INTRAVENOUS ONCE
Status: COMPLETED | OUTPATIENT
Start: 2025-06-21 | End: 2025-06-21

## 2025-06-21 RX ORDER — MORPHINE SULFATE 2 MG/ML
2 INJECTION, SOLUTION INTRAMUSCULAR; INTRAVENOUS ONCE
Refills: 0 | Status: COMPLETED | OUTPATIENT
Start: 2025-06-21 | End: 2025-06-21

## 2025-06-21 RX ORDER — ONDANSETRON 2 MG/ML
4 INJECTION INTRAMUSCULAR; INTRAVENOUS EVERY 6 HOURS PRN
Status: DISCONTINUED | OUTPATIENT
Start: 2025-06-21 | End: 2025-06-22 | Stop reason: SDUPTHER

## 2025-06-21 RX ADMIN — ONDANSETRON 4 MG: 2 INJECTION, SOLUTION INTRAMUSCULAR; INTRAVENOUS at 22:32

## 2025-06-21 RX ADMIN — ORPHENADRINE CITRATE 30 MG: 30 INJECTION, SOLUTION INTRAMUSCULAR; INTRAVENOUS at 22:46

## 2025-06-21 RX ADMIN — MORPHINE SULFATE 2 MG: 2 INJECTION, SOLUTION INTRAMUSCULAR; INTRAVENOUS at 22:32

## 2025-06-21 ASSESSMENT — PAIN SCALES - GENERAL: PAINLEVEL_OUTOF10: 10

## 2025-06-21 ASSESSMENT — PAIN DESCRIPTION - LOCATION: LOCATION: BACK

## 2025-06-21 ASSESSMENT — PAIN - FUNCTIONAL ASSESSMENT: PAIN_FUNCTIONAL_ASSESSMENT: 0-10

## 2025-06-22 ENCOUNTER — APPOINTMENT (OUTPATIENT)
Dept: ULTRASOUND IMAGING | Age: 75
DRG: 543 | End: 2025-06-22
Payer: MEDICARE

## 2025-06-22 ENCOUNTER — APPOINTMENT (OUTPATIENT)
Dept: CT IMAGING | Age: 75
DRG: 543 | End: 2025-06-22
Payer: MEDICARE

## 2025-06-22 PROBLEM — N17.9 AKI (ACUTE KIDNEY INJURY): Status: ACTIVE | Noted: 2025-06-22

## 2025-06-22 PROBLEM — M54.50 LUMBAR BACK PAIN: Status: ACTIVE | Noted: 2025-06-22

## 2025-06-22 PROBLEM — M54.9 INTRACTABLE BACK PAIN: Status: ACTIVE | Noted: 2025-06-22

## 2025-06-22 LAB
ANION GAP SERPL CALCULATED.3IONS-SCNC: 12 MMOL/L (ref 7–16)
BASOPHILS # BLD: 0.09 K/UL (ref 0–0.2)
BASOPHILS NFR BLD: 1 % (ref 0–2)
BUN SERPL-MCNC: 19 MG/DL (ref 8–23)
CALCIUM SERPL-MCNC: 9 MG/DL (ref 8.8–10.2)
CHLORIDE SERPL-SCNC: 103 MMOL/L (ref 98–107)
CO2 SERPL-SCNC: 24 MMOL/L (ref 22–29)
CREAT SERPL-MCNC: 0.7 MG/DL (ref 0.7–1.2)
CREAT UR-MCNC: 142 MG/DL (ref 40–278)
EOSINOPHIL # BLD: 0.17 K/UL (ref 0.05–0.5)
EOSINOPHILS RELATIVE PERCENT: 2 % (ref 0–6)
ERYTHROCYTE [DISTWIDTH] IN BLOOD BY AUTOMATED COUNT: 12.4 % (ref 11.5–15)
GFR, ESTIMATED: >90 ML/MIN/1.73M2
GLUCOSE BLD-MCNC: 113 MG/DL (ref 74–99)
GLUCOSE BLD-MCNC: 129 MG/DL (ref 74–99)
GLUCOSE BLD-MCNC: 137 MG/DL (ref 74–99)
GLUCOSE BLD-MCNC: 198 MG/DL (ref 74–99)
GLUCOSE BLD-MCNC: 198 MG/DL (ref 74–99)
GLUCOSE SERPL-MCNC: 172 MG/DL (ref 74–99)
HCT VFR BLD AUTO: 40.1 % (ref 37–54)
HGB BLD-MCNC: 13.5 G/DL (ref 12.5–16.5)
IMM GRANULOCYTES # BLD AUTO: 0.06 K/UL (ref 0–0.58)
IMM GRANULOCYTES NFR BLD: 1 % (ref 0–5)
INR PPP: 1
LYMPHOCYTES NFR BLD: 1.86 K/UL (ref 1.5–4)
LYMPHOCYTES RELATIVE PERCENT: 23 % (ref 20–42)
MCH RBC QN AUTO: 31 PG (ref 26–35)
MCHC RBC AUTO-ENTMCNC: 33.7 G/DL (ref 32–34.5)
MCV RBC AUTO: 92 FL (ref 80–99.9)
MONOCYTES NFR BLD: 0.78 K/UL (ref 0.1–0.95)
MONOCYTES NFR BLD: 10 % (ref 2–12)
NEUTROPHILS NFR BLD: 64 % (ref 43–80)
NEUTS SEG NFR BLD: 5.29 K/UL (ref 1.8–7.3)
PLATELET # BLD AUTO: 268 K/UL (ref 130–450)
PMV BLD AUTO: 9.6 FL (ref 7–12)
POTASSIUM SERPL-SCNC: 4.2 MMOL/L (ref 3.5–5.1)
PROTHROMBIN TIME: 11.5 SEC (ref 9.3–12.4)
RBC # BLD AUTO: 4.36 M/UL (ref 3.8–5.8)
SODIUM SERPL-SCNC: 138 MMOL/L (ref 136–145)
SODIUM UR-SCNC: 152 MMOL/L
UUN UR-MCNC: 1039 MG/DL (ref 800–1666)
WBC OTHER # BLD: 8.3 K/UL (ref 4.5–11.5)

## 2025-06-22 PROCEDURE — 82962 GLUCOSE BLOOD TEST: CPT

## 2025-06-22 PROCEDURE — G0378 HOSPITAL OBSERVATION PER HR: HCPCS

## 2025-06-22 PROCEDURE — 2580000003 HC RX 258: Performed by: STUDENT IN AN ORGANIZED HEALTH CARE EDUCATION/TRAINING PROGRAM

## 2025-06-22 PROCEDURE — 72131 CT LUMBAR SPINE W/O DYE: CPT

## 2025-06-22 PROCEDURE — 99223 1ST HOSP IP/OBS HIGH 75: CPT | Performed by: NURSE PRACTITIONER

## 2025-06-22 PROCEDURE — 76770 US EXAM ABDO BACK WALL COMP: CPT

## 2025-06-22 PROCEDURE — 85610 PROTHROMBIN TIME: CPT

## 2025-06-22 PROCEDURE — 99222 1ST HOSP IP/OBS MODERATE 55: CPT | Performed by: NEUROLOGICAL SURGERY

## 2025-06-22 PROCEDURE — 80048 BASIC METABOLIC PNL TOTAL CA: CPT

## 2025-06-22 PROCEDURE — 6370000000 HC RX 637 (ALT 250 FOR IP): Performed by: EMERGENCY MEDICINE

## 2025-06-22 PROCEDURE — 85025 COMPLETE CBC W/AUTO DIFF WBC: CPT

## 2025-06-22 PROCEDURE — 36415 COLL VENOUS BLD VENIPUNCTURE: CPT

## 2025-06-22 PROCEDURE — 6360000002 HC RX W HCPCS: Performed by: NURSE PRACTITIONER

## 2025-06-22 PROCEDURE — 6370000000 HC RX 637 (ALT 250 FOR IP): Performed by: NURSE PRACTITIONER

## 2025-06-22 RX ORDER — POTASSIUM CHLORIDE 7.45 MG/ML
10 INJECTION INTRAVENOUS PRN
Status: DISCONTINUED | OUTPATIENT
Start: 2025-06-22 | End: 2025-06-26 | Stop reason: HOSPADM

## 2025-06-22 RX ORDER — ASPIRIN 81 MG/1
81 TABLET, CHEWABLE ORAL DAILY
Status: DISCONTINUED | OUTPATIENT
Start: 2025-06-22 | End: 2025-06-26 | Stop reason: HOSPADM

## 2025-06-22 RX ORDER — POLYETHYLENE GLYCOL 3350 17 G/17G
17 POWDER, FOR SOLUTION ORAL DAILY PRN
Status: DISCONTINUED | OUTPATIENT
Start: 2025-06-22 | End: 2025-06-26 | Stop reason: HOSPADM

## 2025-06-22 RX ORDER — POTASSIUM CHLORIDE 1500 MG/1
40 TABLET, EXTENDED RELEASE ORAL PRN
Status: DISCONTINUED | OUTPATIENT
Start: 2025-06-22 | End: 2025-06-26 | Stop reason: HOSPADM

## 2025-06-22 RX ORDER — DEXTROSE MONOHYDRATE 100 MG/ML
INJECTION, SOLUTION INTRAVENOUS CONTINUOUS PRN
Status: DISCONTINUED | OUTPATIENT
Start: 2025-06-22 | End: 2025-06-26 | Stop reason: HOSPADM

## 2025-06-22 RX ORDER — ATORVASTATIN CALCIUM 20 MG/1
10 TABLET, FILM COATED ORAL DAILY
Status: DISCONTINUED | OUTPATIENT
Start: 2025-06-22 | End: 2025-06-26 | Stop reason: HOSPADM

## 2025-06-22 RX ORDER — GLUCAGON 1 MG/ML
1 KIT INJECTION PRN
Status: DISCONTINUED | OUTPATIENT
Start: 2025-06-22 | End: 2025-06-26 | Stop reason: HOSPADM

## 2025-06-22 RX ORDER — MEMANTINE HYDROCHLORIDE 10 MG/1
10 TABLET ORAL 2 TIMES DAILY
Status: DISCONTINUED | OUTPATIENT
Start: 2025-06-22 | End: 2025-06-26 | Stop reason: HOSPADM

## 2025-06-22 RX ORDER — LOSARTAN POTASSIUM 50 MG/1
100 TABLET ORAL DAILY
Status: DISCONTINUED | OUTPATIENT
Start: 2025-06-22 | End: 2025-06-26 | Stop reason: HOSPADM

## 2025-06-22 RX ORDER — ACETAMINOPHEN 650 MG/1
650 SUPPOSITORY RECTAL EVERY 6 HOURS PRN
Status: DISCONTINUED | OUTPATIENT
Start: 2025-06-22 | End: 2025-06-26 | Stop reason: HOSPADM

## 2025-06-22 RX ORDER — SODIUM CHLORIDE, SODIUM LACTATE, POTASSIUM CHLORIDE, CALCIUM CHLORIDE 600; 310; 30; 20 MG/100ML; MG/100ML; MG/100ML; MG/100ML
INJECTION, SOLUTION INTRAVENOUS CONTINUOUS
Status: DISCONTINUED | OUTPATIENT
Start: 2025-06-22 | End: 2025-06-22

## 2025-06-22 RX ORDER — SODIUM CHLORIDE 0.9 % (FLUSH) 0.9 %
5-40 SYRINGE (ML) INJECTION PRN
Status: DISCONTINUED | OUTPATIENT
Start: 2025-06-22 | End: 2025-06-26 | Stop reason: HOSPADM

## 2025-06-22 RX ORDER — ONDANSETRON 2 MG/ML
4 INJECTION INTRAMUSCULAR; INTRAVENOUS EVERY 6 HOURS PRN
Status: DISCONTINUED | OUTPATIENT
Start: 2025-06-22 | End: 2025-06-26 | Stop reason: HOSPADM

## 2025-06-22 RX ORDER — MAGNESIUM SULFATE IN WATER 40 MG/ML
2000 INJECTION, SOLUTION INTRAVENOUS PRN
Status: DISCONTINUED | OUTPATIENT
Start: 2025-06-22 | End: 2025-06-26 | Stop reason: HOSPADM

## 2025-06-22 RX ORDER — AMLODIPINE BESYLATE 5 MG/1
5 TABLET ORAL DAILY
Status: DISCONTINUED | OUTPATIENT
Start: 2025-06-22 | End: 2025-06-23

## 2025-06-22 RX ORDER — HYDRALAZINE HYDROCHLORIDE 20 MG/ML
10 INJECTION INTRAMUSCULAR; INTRAVENOUS EVERY 6 HOURS PRN
Status: DISCONTINUED | OUTPATIENT
Start: 2025-06-22 | End: 2025-06-26 | Stop reason: HOSPADM

## 2025-06-22 RX ORDER — LIDOCAINE 4 G/G
1 PATCH TOPICAL DAILY
Status: DISCONTINUED | OUTPATIENT
Start: 2025-06-22 | End: 2025-06-26 | Stop reason: HOSPADM

## 2025-06-22 RX ORDER — ONDANSETRON 4 MG/1
4 TABLET, ORALLY DISINTEGRATING ORAL EVERY 8 HOURS PRN
Status: DISCONTINUED | OUTPATIENT
Start: 2025-06-22 | End: 2025-06-26 | Stop reason: HOSPADM

## 2025-06-22 RX ORDER — ACETAMINOPHEN 325 MG/1
650 TABLET ORAL EVERY 6 HOURS PRN
Status: DISCONTINUED | OUTPATIENT
Start: 2025-06-22 | End: 2025-06-26 | Stop reason: HOSPADM

## 2025-06-22 RX ORDER — KETOROLAC TROMETHAMINE 30 MG/ML
15 INJECTION, SOLUTION INTRAMUSCULAR; INTRAVENOUS ONCE
Status: DISCONTINUED | OUTPATIENT
Start: 2025-06-22 | End: 2025-06-26 | Stop reason: HOSPADM

## 2025-06-22 RX ORDER — SODIUM CHLORIDE 9 MG/ML
INJECTION, SOLUTION INTRAVENOUS PRN
Status: DISCONTINUED | OUTPATIENT
Start: 2025-06-22 | End: 2025-06-26 | Stop reason: HOSPADM

## 2025-06-22 RX ORDER — SODIUM CHLORIDE 0.9 % (FLUSH) 0.9 %
5-40 SYRINGE (ML) INJECTION EVERY 12 HOURS SCHEDULED
Status: DISCONTINUED | OUTPATIENT
Start: 2025-06-22 | End: 2025-06-26 | Stop reason: HOSPADM

## 2025-06-22 RX ORDER — INSULIN LISPRO 100 [IU]/ML
0-4 INJECTION, SOLUTION INTRAVENOUS; SUBCUTANEOUS
Status: DISCONTINUED | OUTPATIENT
Start: 2025-06-22 | End: 2025-06-26 | Stop reason: HOSPADM

## 2025-06-22 RX ORDER — ENOXAPARIN SODIUM 100 MG/ML
40 INJECTION SUBCUTANEOUS DAILY
Status: DISCONTINUED | OUTPATIENT
Start: 2025-06-22 | End: 2025-06-22

## 2025-06-22 RX ORDER — KETOROLAC TROMETHAMINE 30 MG/ML
15 INJECTION, SOLUTION INTRAMUSCULAR; INTRAVENOUS ONCE
Status: DISCONTINUED | OUTPATIENT
Start: 2025-06-22 | End: 2025-06-22 | Stop reason: SDUPTHER

## 2025-06-22 RX ADMIN — MEMANTINE 10 MG: 10 TABLET ORAL at 21:24

## 2025-06-22 RX ADMIN — MEMANTINE 10 MG: 10 TABLET ORAL at 11:07

## 2025-06-22 RX ADMIN — ATORVASTATIN CALCIUM 10 MG: 20 TABLET, FILM COATED ORAL at 08:58

## 2025-06-22 RX ADMIN — INSULIN LISPRO 1 UNITS: 100 INJECTION, SOLUTION INTRAVENOUS; SUBCUTANEOUS at 11:16

## 2025-06-22 RX ADMIN — SODIUM CHLORIDE, SODIUM LACTATE, POTASSIUM CHLORIDE, AND CALCIUM CHLORIDE: .6; .31; .03; .02 INJECTION, SOLUTION INTRAVENOUS at 06:38

## 2025-06-22 RX ADMIN — INSULIN LISPRO 1 UNITS: 100 INJECTION, SOLUTION INTRAVENOUS; SUBCUTANEOUS at 21:24

## 2025-06-22 RX ADMIN — ACETAMINOPHEN 650 MG: 325 TABLET ORAL at 15:45

## 2025-06-22 RX ADMIN — HYDRALAZINE HYDROCHLORIDE 10 MG: 20 INJECTION INTRAMUSCULAR; INTRAVENOUS at 06:41

## 2025-06-22 ASSESSMENT — PAIN DESCRIPTION - PAIN TYPE: TYPE: ACUTE PAIN

## 2025-06-22 ASSESSMENT — PAIN DESCRIPTION - LOCATION: LOCATION: BACK

## 2025-06-22 ASSESSMENT — PAIN DESCRIPTION - DESCRIPTORS: DESCRIPTORS: ACHING;DISCOMFORT;SORE

## 2025-06-22 ASSESSMENT — PAIN - FUNCTIONAL ASSESSMENT: PAIN_FUNCTIONAL_ASSESSMENT: ACTIVITIES ARE NOT PREVENTED

## 2025-06-22 ASSESSMENT — PAIN DESCRIPTION - ORIENTATION: ORIENTATION: MID;LOWER

## 2025-06-22 ASSESSMENT — PAIN DESCRIPTION - FREQUENCY: FREQUENCY: INTERMITTENT

## 2025-06-22 ASSESSMENT — PAIN DESCRIPTION - ONSET: ONSET: GRADUAL

## 2025-06-22 ASSESSMENT — PAIN SCALES - GENERAL: PAINLEVEL_OUTOF10: 6

## 2025-06-22 NOTE — CONSULTS
[facility]              [address] [place] [address]                              CONSULTATION      PATIENT NAME: MYKEL GROSS      : 1950  MED REC NO: [id number]                        ROOM: [facility]  ACCOUNT NO: [id number]                       ADMIT DATE: 2025  PROVIDER: Tavo Nava MD    NEUROSURGERY CONSULT    CONSULT DATE: 2025      REASON FOR CONSULT:  Back pain.    HISTORY OF PRESENT ILLNESS:  The patient is a 75-year-old gentleman with approximately a 5-year history of back pain.  He has actually had a prior lumbar surgery in [place].  Describes the pain as a sharp stabbing pain.  It is made worse with activity and better with rest.  He denies any new numbness, tingling, or weakness or loss of control of bowel or bladder function.  Pain got so bad that he presented to the emergency room for further evaluation and management.    PAST MEDICAL HISTORY:  Positive for diabetes, hypertension, hyperlipidemia, mild cognitive impairment, obstructive sleep apnea.    PAST SURGICAL HISTORY:  Positive for back surgery, cholecystectomy, eye surgery, knee surgery, and Inspire device implantation for sleep apnea.    FAMILY HISTORY:  Positive for diabetes in his mother and father.    SOCIAL HISTORY:  Negative for tobacco or alcohol use.    ALLERGIES:  HE HAS NO KNOWN DRUG ALLERGIES.      REVIEW OF SYSTEMS:  HEENT:  Negative for headache, double vision, or blurred vision.  CARDIOVASCULAR:  Negative for chest pain, arrhythmia, or palpitations.  RESPIRATORY:  Negative for shortness of breath, asthma, bronchitis, or pneumonia.  GASTROINTESTINAL:  Negative for heartburn, nausea, vomiting, diarrhea, or constipation.  GENITOURINARY:  Negative for hematuria or dysuria.  HEMATOLOGIC:  Positive for easy bruising.  INFECTIOUS:  Negative for any recent infection.  MUSCULOSKELETAL:  Positive for joint stiffness and swelling.  PSYCHIATRIC:  Negative

## 2025-06-22 NOTE — H&P
Hospitalist History & Physical      PCP: Opal Vega MD    Date of Service: Pt seen/examined on 6/22/2025 and is     admitted to Inpatient with expected LOS greater than two midnights due to medical therapy.          Chief Complaint:  had concerns including Back Pain (Pt complaint of severe back pain today after trying to get up from toilet. ).    History of Present Illness:    Mr. Hank Julio, a 75 y.o. year old male  who  has a past medical history of Diabetes (HCC), High cholesterol, Hypertension, Mild cognitive impairment, DINO (obstructive sleep apnea), and Pneumonia due to COVID-19 virus.     ER COURSE:    Patient came to the ED due to complaints of back pain. He is Guatemalan speaking. He states that his back pain has been going on for about the last month but today he went to the bathroom and was unable to get off of the toilet. He denies any injury to his back or any falls. He denies any urinary or bowel incontinence.     ED Course: Vitals temp 97.6, HR 75, RR 17, bp 191/75  He was found to have slightly elevated creatinine at 1.3, blood glucose 174, UA negative, CT lumbar 1.1cm lytic focus T12 uncertain etiology but can not exclude metastatic disease.       Past Medical History:        Diagnosis Date    Diabetes (HCC) 2002    High cholesterol     Hypertension 2015    Mild cognitive impairment     States forgets somethings was told is due to the sleep apnea \"That is whey I am having the surgery\"    DINO (obstructive sleep apnea)     Pneumonia due to COVID-19 virus 2022       Past Surgical History:        Procedure Laterality Date    BACK SURGERY      lumbar discectomy  \"L5-S1\"    CHOLECYSTECTOMY      EXAMINATION UNDER ANESTHESIA N/A 10/11/2022    DRUG INDUCED SLEEP ENDOSCOPY performed by Kem Pond DO at [facility] OR    EYE SURGERY Left     \"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  Could obtain MRI if clinically warranted.         Assessment:    Principal Problem:    Intractable back pain  Resolved Problems:    * No resolved hospital problems. *      Plan:  Discussed patient's case with ED physician.    Back pain  -CT scan concern for 1.1cm lytic lesion concern for metastatic disease  -neurosurgery consulted  -continue pain medication   -may need to consult oncology but will hold off till seen by neurosurgery     2. HX Alzheimers  -continue home namenda    3. Diabetes  -SSI    4. HX HTN  -continue home amlodipine, cozaar    Diet: Diet NPO  Code Status: Full Code  Surrogate decision maker confirmed with patient:   Extended Emergency Contact Information  Primary Emergency Contact: MaudeValerie  Home Phone: [phone]  Relation: Child   needed? No  Secondary Emergency Contact: SalmaSiri  Home Phone: [phone]  Relation: Spouse  Preferred language: Armenian   needed? Yes    DVT Prophylaxis: [x]Lovenox []Heparin []PCD [] Warfarin/NOAC []Encouraged ambulation  Disposition: [x]Med/Surg [] Intermediate [] ICU/CCU  Admit status: [] Observation [x] Inpatient     +++++++++++++++++++++++++++++++++++++++++++++++++  LEAH Lao  [facility] Hospitalist  [place], [place]    I can be reached via Perfect Serve or at [phone] with any questions or concerns through 0700. After 0700 one of my colleagues with the [facility] Hospitalist team will assume patient's care.     +++++++++++++++++++++++++++++++++++++++++++++++++  NOTE: This report was transcribed using voice recognition software. Every effort was made to ensure accuracy; however, inadvertent computerized transcription errors may be present.

## 2025-06-22 NOTE — PROGRESS NOTES
Date: 2025       Patient Name: Hank Julio  : 1950      MRN: [id number]    PT order received and chart reviewed. PT evaluation held pending activity order after bone scan and myelogram    Bhargav Tobias PT, DPT  [id number]

## 2025-06-22 NOTE — PROGRESS NOTES
Message to Dr. Crooks via perfect serve. Patient's Cr. 1.3 he is scheduled Ketorolac 15mg now,  I held it. Lab is here now to CBC w/diff, PT/INR, BMP w/mag

## 2025-06-22 NOTE — PROGRESS NOTES
4 Eyes Skin Assessment     NAME:  Hank Julio  YOB: 1950  MEDICAL RECORD NUMBER:  [id number]    The patient is being assessed for  Admission    I agree that at least one RN has performed a thorough Head to Toe Skin Assessment on the patient. ALL assessment sites listed below have been assessed.      Areas assessed by both nurses:    Head, Face, Ears, Shoulders, Back, Chest, Arms, Elbows, Hands, and Sacrum. Buttock, Coccyx, Ischium        Does the Patient have a Wound? No noted wound(s)       Seng Prevention initiated by RN: yes  Wound Care Orders initiated by RN: No    Pressure Injury (Stage 3,4, Unstageable, DTI, NWPT, and Complex wounds) if present, place Wound referral order by RN under : No    New Ostomies, if present place, Ostomy referral order under : No     Nurse 1 eSignature: Electronically signed by Yokasta Gonzales RN on 6/22/25 at 3:53 PM EDT    **SHARE this note so that the co-signing nurse can place an eSignature**    Nurse 2 eSignature: Electronically signed by Raeann Rivero RN on 6/22/25 at 7:37 PM EDT

## 2025-06-22 NOTE — PROGRESS NOTES
Floor notified NM bone scan would be done 6/23 in am. We have to order the nuclear medicine from [facility].

## 2025-06-22 NOTE — PROGRESS NOTES
Patient seen and examined at the bedside, he came here for back pain and CT scan show lytic bone lesion.  Neurosurgery consulted and recommended for bone scan and myelogram tomorrow.  He admitted today and billed for today's service.

## 2025-06-23 ENCOUNTER — APPOINTMENT (OUTPATIENT)
Dept: CT IMAGING | Age: 75
DRG: 543 | End: 2025-06-23
Attending: NEUROLOGICAL SURGERY
Payer: MEDICARE

## 2025-06-23 ENCOUNTER — APPOINTMENT (OUTPATIENT)
Dept: NUCLEAR MEDICINE | Age: 75
DRG: 543 | End: 2025-06-23
Payer: MEDICARE

## 2025-06-23 ENCOUNTER — APPOINTMENT (OUTPATIENT)
Dept: GENERAL RADIOLOGY | Age: 75
DRG: 543 | End: 2025-06-23
Payer: MEDICARE

## 2025-06-23 LAB
ANION GAP SERPL CALCULATED.3IONS-SCNC: 10 MMOL/L (ref 7–16)
BASOPHILS # BLD: 0.08 K/UL (ref 0–0.2)
BASOPHILS NFR BLD: 1 % (ref 0–2)
BUN SERPL-MCNC: 18 MG/DL (ref 8–23)
CALCIUM SERPL-MCNC: 8.8 MG/DL (ref 8.8–10.2)
CHLORIDE SERPL-SCNC: 104 MMOL/L (ref 98–107)
CO2 SERPL-SCNC: 25 MMOL/L (ref 22–29)
CREAT SERPL-MCNC: 0.8 MG/DL (ref 0.7–1.2)
EOSINOPHIL # BLD: 0.22 K/UL (ref 0.05–0.5)
EOSINOPHILS RELATIVE PERCENT: 3 % (ref 0–6)
ERYTHROCYTE [DISTWIDTH] IN BLOOD BY AUTOMATED COUNT: 12.5 % (ref 11.5–15)
GFR, ESTIMATED: >90 ML/MIN/1.73M2
GLUCOSE BLD-MCNC: 129 MG/DL (ref 74–99)
GLUCOSE BLD-MCNC: 134 MG/DL (ref 74–99)
GLUCOSE BLD-MCNC: 138 MG/DL (ref 74–99)
GLUCOSE BLD-MCNC: 223 MG/DL (ref 74–99)
GLUCOSE SERPL-MCNC: 123 MG/DL (ref 74–99)
HCT VFR BLD AUTO: 38.5 % (ref 37–54)
HGB BLD-MCNC: 12.9 G/DL (ref 12.5–16.5)
IMM GRANULOCYTES # BLD AUTO: 0.04 K/UL (ref 0–0.58)
IMM GRANULOCYTES NFR BLD: 1 % (ref 0–5)
LYMPHOCYTES NFR BLD: 1.8 K/UL (ref 1.5–4)
LYMPHOCYTES RELATIVE PERCENT: 24 % (ref 20–42)
MCH RBC QN AUTO: 31.2 PG (ref 26–35)
MCHC RBC AUTO-ENTMCNC: 33.5 G/DL (ref 32–34.5)
MCV RBC AUTO: 93 FL (ref 80–99.9)
MONOCYTES NFR BLD: 0.76 K/UL (ref 0.1–0.95)
MONOCYTES NFR BLD: 10 % (ref 2–12)
NEUTROPHILS NFR BLD: 61 % (ref 43–80)
NEUTS SEG NFR BLD: 4.6 K/UL (ref 1.8–7.3)
PLATELET # BLD AUTO: 256 K/UL (ref 130–450)
PMV BLD AUTO: 9.9 FL (ref 7–12)
POTASSIUM SERPL-SCNC: 4.2 MMOL/L (ref 3.5–5.1)
RBC # BLD AUTO: 4.14 M/UL (ref 3.8–5.8)
SODIUM SERPL-SCNC: 140 MMOL/L (ref 136–145)
WBC OTHER # BLD: 7.5 K/UL (ref 4.5–11.5)

## 2025-06-23 PROCEDURE — 80048 BASIC METABOLIC PNL TOTAL CA: CPT

## 2025-06-23 PROCEDURE — G0378 HOSPITAL OBSERVATION PER HR: HCPCS | Performed by: NURSE PRACTITIONER

## 2025-06-23 PROCEDURE — 3430000000 HC RX DIAGNOSTIC RADIOPHARMACEUTICAL: Performed by: RADIOLOGY

## 2025-06-23 PROCEDURE — 82962 GLUCOSE BLOOD TEST: CPT

## 2025-06-23 PROCEDURE — G0378 HOSPITAL OBSERVATION PER HR: HCPCS

## 2025-06-23 PROCEDURE — 6370000000 HC RX 637 (ALT 250 FOR IP): Performed by: NURSE PRACTITIONER

## 2025-06-23 PROCEDURE — 2500000003 HC RX 250 WO HCPCS: Performed by: NURSE PRACTITIONER

## 2025-06-23 PROCEDURE — 72132 CT LUMBAR SPINE W/DYE: CPT

## 2025-06-23 PROCEDURE — 36415 COLL VENOUS BLD VENIPUNCTURE: CPT

## 2025-06-23 PROCEDURE — A9503 TC99M MEDRONATE: HCPCS | Performed by: RADIOLOGY

## 2025-06-23 PROCEDURE — B01B1ZZ FLUOROSCOPY OF SPINAL CORD USING LOW OSMOLAR CONTRAST: ICD-10-PCS | Performed by: NEUROLOGICAL SURGERY

## 2025-06-23 PROCEDURE — 72265 MYELOGRAPHY L-S SPINE: CPT

## 2025-06-23 PROCEDURE — 85025 COMPLETE CBC W/AUTO DIFF WBC: CPT

## 2025-06-23 PROCEDURE — 99232 SBSQ HOSP IP/OBS MODERATE 35: CPT | Performed by: INTERNAL MEDICINE

## 2025-06-23 PROCEDURE — 6370000000 HC RX 637 (ALT 250 FOR IP): Performed by: INTERNAL MEDICINE

## 2025-06-23 PROCEDURE — 6360000002 HC RX W HCPCS: Performed by: NURSE PRACTITIONER

## 2025-06-23 PROCEDURE — 3E0R33Z INTRODUCTION OF ANTI-INFLAMMATORY INTO SPINAL CANAL, PERCUTANEOUS APPROACH: ICD-10-PCS | Performed by: NEUROLOGICAL SURGERY

## 2025-06-23 PROCEDURE — 78306 BONE IMAGING WHOLE BODY: CPT | Performed by: NEUROLOGICAL SURGERY

## 2025-06-23 PROCEDURE — 6370000000 HC RX 637 (ALT 250 FOR IP): Performed by: EMERGENCY MEDICINE

## 2025-06-23 RX ORDER — AMLODIPINE BESYLATE 10 MG/1
10 TABLET ORAL DAILY
Status: DISCONTINUED | OUTPATIENT
Start: 2025-06-24 | End: 2025-06-26 | Stop reason: HOSPADM

## 2025-06-23 RX ORDER — TC 99M MEDRONATE 20 MG/10ML
29 INJECTION, POWDER, LYOPHILIZED, FOR SOLUTION INTRAVENOUS ONCE
Status: COMPLETED | OUTPATIENT
Start: 2025-06-23 | End: 2025-06-23

## 2025-06-23 RX ORDER — IOPAMIDOL 408 MG/ML
12 INJECTION, SOLUTION INTRATHECAL
Status: DISCONTINUED | OUTPATIENT
Start: 2025-06-23 | End: 2025-06-26 | Stop reason: HOSPADM

## 2025-06-23 RX ADMIN — LOSARTAN POTASSIUM 100 MG: 50 TABLET, FILM COATED ORAL at 08:38

## 2025-06-23 RX ADMIN — AMLODIPINE BESYLATE 5 MG: 5 TABLET ORAL at 08:39

## 2025-06-23 RX ADMIN — ACETAMINOPHEN 650 MG: 325 TABLET ORAL at 22:36

## 2025-06-23 RX ADMIN — ATORVASTATIN CALCIUM 10 MG: 20 TABLET, FILM COATED ORAL at 08:39

## 2025-06-23 RX ADMIN — INSULIN LISPRO 1 UNITS: 100 INJECTION, SOLUTION INTRAVENOUS; SUBCUTANEOUS at 22:37

## 2025-06-23 RX ADMIN — HYDRALAZINE HYDROCHLORIDE 10 MG: 20 INJECTION INTRAMUSCULAR; INTRAVENOUS at 06:46

## 2025-06-23 RX ADMIN — TC 99M MEDRONATE 29 MILLICURIE: 20 INJECTION, POWDER, LYOPHILIZED, FOR SOLUTION INTRAVENOUS at 09:39

## 2025-06-23 RX ADMIN — MEMANTINE 10 MG: 10 TABLET ORAL at 08:39

## 2025-06-23 RX ADMIN — MEMANTINE 10 MG: 10 TABLET ORAL at 21:47

## 2025-06-23 RX ADMIN — ACETAMINOPHEN 650 MG: 325 TABLET ORAL at 06:46

## 2025-06-23 RX ADMIN — SODIUM CHLORIDE, PRESERVATIVE FREE 10 ML: 5 INJECTION INTRAVENOUS at 08:40

## 2025-06-23 RX ADMIN — SODIUM CHLORIDE, PRESERVATIVE FREE 10 ML: 5 INJECTION INTRAVENOUS at 21:53

## 2025-06-23 ASSESSMENT — PAIN SCALES - GENERAL
PAINLEVEL_OUTOF10: 5
PAINLEVEL_OUTOF10: 3

## 2025-06-23 ASSESSMENT — PAIN DESCRIPTION - LOCATION
LOCATION: BACK
LOCATION: BACK

## 2025-06-23 ASSESSMENT — PAIN DESCRIPTION - DESCRIPTORS
DESCRIPTORS: ACHING
DESCRIPTORS: ACHING;DISCOMFORT;DULL

## 2025-06-23 ASSESSMENT — PAIN DESCRIPTION - ORIENTATION: ORIENTATION: LOWER;MID

## 2025-06-23 ASSESSMENT — PAIN - FUNCTIONAL ASSESSMENT: PAIN_FUNCTIONAL_ASSESSMENT: ACTIVITIES ARE NOT PREVENTED

## 2025-06-23 NOTE — PROGRESS NOTES
Occupational Therapy  Occupational Therapy    Date:2025  Patient Name: Hank Julio  MRN: [id number]  : 1950  Room: [address] [place] [address]-A     OT orders received and chart reviewed. OT eval on hold at this time pending neurosurgery POC, per notes, plan is to await myelogram and bone pfeiffer results.   OT will follow and re-attempt eval as appropriate, pending neurosurgery POC, at a later time/date.     JERICHO Escalera, OTR/L [id number]

## 2025-06-23 NOTE — PROGRESS NOTES
Physical Therapy      Name: Hank Julio  : 1950  MRN: [id number]  Date of Service: 2025  Room #:  5214/5214-A    PT held - awaiting neurosurgery recommendations. PT will follow up as able/appropriate.    Ken Dodd, PT, DPT  [id number]

## 2025-06-23 NOTE — PLAN OF CARE
Problem: Chronic Conditions and Co-morbidities  Goal: Patient's chronic conditions and co-morbidity symptoms are monitored and maintained or improved  6/23/2025 0032 by Goyo Washington RN  Outcome: Progressing  6/22/2025 1145 by Yokasta Gonzales RN  Outcome: Progressing     Problem: Discharge Planning  Goal: Discharge to home or other facility with appropriate resources  6/23/2025 0032 by Goyo Washington RN  Outcome: Progressing  6/22/2025 1145 by Yokasta Gonzales RN  Outcome: Progressing     Problem: Pain  Goal: Verbalizes/displays adequate comfort level or baseline comfort level  6/23/2025 0032 by Goyo Washington RN  Outcome: Progressing  6/22/2025 1145 by Yokasta Gonzales RN  Outcome: Progressing     Problem: Safety - Adult  Goal: Free from fall injury  6/23/2025 0032 by Goyo Washington RN  Outcome: Progressing  6/22/2025 1145 by Yokasta Gonzales RN  Outcome: Progressing

## 2025-06-23 NOTE — PROCEDURES
Procedure Note  ______________________________________________________________      IR/FL LUMBAR PUNCTURE WITH CONTRAST  INJECTION FOR CT MYELOGRAM  [facility] 5S NEURO SPINE    Patient Name: Hank Julio   YOB: 1950  Medical Record Number: [id number]  Date of Procedure: 6/23/25  Room/Bed: [facility]/5214    Preoperative diagnosis: Lumbar Radiculopathy (M54.16 [ICD-10-CM])    Postoperative diagnosis: Same    Consent: Informed consent was obtained from the patient via representative from [facility] Video Remote Interpreting Service due to language barrier. prior to the procedure. The details of the procedure, as well is its risks, benefits, and alternatives, were explained. These risks include, but are not limited to, nerve root injury, bleeding, infection (including meningitis), reaction to contrast used, dural leak (possibly requiring blood patch treatment), and spinal headache. Opportunity was provided to ask questions, which were answered.     Procedure Type: Fluoroscopic-guided lumbar puncture with contrast injection for CT Myelogram Imaging of lumbar Spine.    Anesthesia: Local    Performed by: KARINE Andrade under on-site supervision by Keesha Pineda MD.    Estimated blood loss: None    Complications: None    Implants: None    Procedure: The appropriate labs reviewed including INR and PT.  A site under fluoroscopy guidance at the level of the L2-L3 interspace was selected.  This site was prepped in usual sterile fashion.  Local anesthesia was administered by infiltration using 1% Lidocaine without epinephrine administered subcutaneously.  A 3.5 in. 20g spinal needle inserted into thecal sac.     Subsequently 12 mL of Isovue M 200 contrast was injected. Final needle placement and contrast within the thecal sac was confirmed and images were saved into PACs.    The patient tolerated the procedure.  There were no immediate complications. Patient was taken to the CT suite for completion of imaging as

## 2025-06-23 NOTE — PROGRESS NOTES
[facility] Hospitalist Progress Note    Admitting Date and Time: 6/21/2025  9:28 PM  Admit Dx: Lumbar back pain [M54.50]  Intractable back pain [M54.9]  Abnormal CT scan, lumbar spine [R93.7]    Subjective:  Patient is being followed for Lumbar back pain [M54.50]  Intractable back pain [M54.9]  Abnormal CT scan, lumbar spine [R93.7]   Pt feels okay, he is purely Chinese-speaking history taken with the help of his daughter at the bedside who can speak English.      ROS: denies fever, chills, cp, sob, n/v, HA unless stated above.      [START ON 6/24/2025] amLODIPine  10 mg Oral Daily    lidocaine  1 patch TransDERmal Daily    sodium chloride flush  5-40 mL IntraVENous 2 times per day    [Held by provider] aspirin  81 mg Oral Daily    losartan  100 mg Oral Daily    memantine  10 mg Oral BID    atorvastatin  10 mg Oral Daily    insulin lispro  0-4 Units SubCUTAneous 4x Daily AC & HS    ketorolac  15 mg IntraVENous Once     iopamidol, 12 mL, ONCE PRN  sodium chloride flush, 5-40 mL, PRN  sodium chloride, , PRN  potassium chloride, 40 mEq, PRN   Or  potassium alternative oral replacement, 40 mEq, PRN   Or  potassium chloride, 10 mEq, PRN  magnesium sulfate, 2,000 mg, PRN  ondansetron, 4 mg, Q8H PRN   Or  ondansetron, 4 mg, Q6H PRN  polyethylene glycol, 17 g, Daily PRN  acetaminophen, 650 mg, Q6H PRN   Or  acetaminophen, 650 mg, Q6H PRN  glucose, 4 tablet, PRN  dextrose bolus, 125 mL, PRN   Or  dextrose bolus, 250 mL, PRN  glucagon (rDNA), 1 mg, PRN  dextrose, , Continuous PRN  hydrALAZINE, 10 mg, Q6H PRN         Objective:    BP (!) 159/71   Pulse 79   Temp 97.8 °F (36.6 °C) (Temporal)   Resp 18   Ht 1.727 m (5' 8\")   Wt 88.5 kg (195 lb)   SpO2 99%   BMI 29.65 kg/m²     General Appearance: alert and oriented to person, place and time and in no acute distress  Skin: warm and dry  Head: normocephalic and atraumatic  Eyes: pupils equal, round, and reactive to light, extraocular eye movements intact,

## 2025-06-23 NOTE — PLAN OF CARE
Problem: Chronic Conditions and Co-morbidities  Goal: Patient's chronic conditions and co-morbidity symptoms are monitored and maintained or improved  6/23/2025 1104 by Loulou Chairez RN  Outcome: Progressing     Problem: Discharge Planning  Goal: Discharge to home or other facility with appropriate resources  6/23/2025 1104 by Loulou Chairez RN  Outcome: Progressing     Problem: Pain  Goal: Verbalizes/displays adequate comfort level or baseline comfort level  6/23/2025 1104 by Loulou Chairez RN  Outcome: Progressing     Problem: Safety - Adult  Goal: Free from fall injury  6/23/2025 1104 by Loulou Chairez RN  Outcome: Progressing

## 2025-06-23 NOTE — CARE COORDINATION
06/23/25 Transition of care: patient is observation due to c/o back pain that has been progressing to inability to walk. He arrived in Er due to not being able to stand after using the commode. He is currently down in CT for a Myelogram and bone scan. He has a history of Alzheimers and is confused at times.  Per Medical Record patient resides in a home with his daughter and wife in a one level home. There is one step to enter. He follows with Dr SHAGGY Vega. He uses the [facility] for his meds. Will follow and await family for further informaiton. Electronically signed by Radha Jeffers RN CM on 6/23/2025 at 11:56 AM

## 2025-06-24 ENCOUNTER — APPOINTMENT (OUTPATIENT)
Dept: GENERAL RADIOLOGY | Age: 75
DRG: 543 | End: 2025-06-24
Payer: MEDICARE

## 2025-06-24 LAB
ANION GAP SERPL CALCULATED.3IONS-SCNC: 12 MMOL/L (ref 7–16)
BASOPHILS # BLD: 0.07 K/UL (ref 0–0.2)
BASOPHILS NFR BLD: 1 % (ref 0–2)
BUN SERPL-MCNC: 26 MG/DL (ref 8–23)
CALCIUM SERPL-MCNC: 9 MG/DL (ref 8.8–10.2)
CHLORIDE SERPL-SCNC: 107 MMOL/L (ref 98–107)
CO2 SERPL-SCNC: 22 MMOL/L (ref 22–29)
CREAT SERPL-MCNC: 0.9 MG/DL (ref 0.7–1.2)
EOSINOPHIL # BLD: 0.21 K/UL (ref 0.05–0.5)
EOSINOPHILS RELATIVE PERCENT: 3 % (ref 0–6)
ERYTHROCYTE [DISTWIDTH] IN BLOOD BY AUTOMATED COUNT: 12.5 % (ref 11.5–15)
GFR, ESTIMATED: 87 ML/MIN/1.73M2
GLUCOSE BLD-MCNC: 147 MG/DL (ref 74–99)
GLUCOSE BLD-MCNC: 159 MG/DL (ref 74–99)
GLUCOSE BLD-MCNC: 185 MG/DL (ref 74–99)
GLUCOSE BLD-MCNC: 186 MG/DL (ref 74–99)
GLUCOSE SERPL-MCNC: 158 MG/DL (ref 74–99)
HCT VFR BLD AUTO: 37.7 % (ref 37–54)
HGB BLD-MCNC: 13.1 G/DL (ref 12.5–16.5)
IMM GRANULOCYTES # BLD AUTO: 0.05 K/UL (ref 0–0.58)
IMM GRANULOCYTES NFR BLD: 1 % (ref 0–5)
LYMPHOCYTES NFR BLD: 1.92 K/UL (ref 1.5–4)
LYMPHOCYTES RELATIVE PERCENT: 25 % (ref 20–42)
MCH RBC QN AUTO: 31.4 PG (ref 26–35)
MCHC RBC AUTO-ENTMCNC: 34.7 G/DL (ref 32–34.5)
MCV RBC AUTO: 90.4 FL (ref 80–99.9)
MONOCYTES NFR BLD: 0.9 K/UL (ref 0.1–0.95)
MONOCYTES NFR BLD: 12 % (ref 2–12)
NEUTROPHILS NFR BLD: 59 % (ref 43–80)
NEUTS SEG NFR BLD: 4.49 K/UL (ref 1.8–7.3)
PLATELET # BLD AUTO: 282 K/UL (ref 130–450)
PMV BLD AUTO: 10 FL (ref 7–12)
POTASSIUM SERPL-SCNC: 4 MMOL/L (ref 3.5–5.1)
RBC # BLD AUTO: 4.17 M/UL (ref 3.8–5.8)
SODIUM SERPL-SCNC: 141 MMOL/L (ref 136–145)
WBC OTHER # BLD: 7.6 K/UL (ref 4.5–11.5)

## 2025-06-24 PROCEDURE — 6360000002 HC RX W HCPCS: Performed by: NURSE PRACTITIONER

## 2025-06-24 PROCEDURE — 97530 THERAPEUTIC ACTIVITIES: CPT

## 2025-06-24 PROCEDURE — 97161 PT EVAL LOW COMPLEX 20 MIN: CPT

## 2025-06-24 PROCEDURE — 6370000000 HC RX 637 (ALT 250 FOR IP): Performed by: EMERGENCY MEDICINE

## 2025-06-24 PROCEDURE — 6370000000 HC RX 637 (ALT 250 FOR IP): Performed by: INTERNAL MEDICINE

## 2025-06-24 PROCEDURE — 6370000000 HC RX 637 (ALT 250 FOR IP): Performed by: NURSE PRACTITIONER

## 2025-06-24 PROCEDURE — 82962 GLUCOSE BLOOD TEST: CPT

## 2025-06-24 PROCEDURE — 36415 COLL VENOUS BLD VENIPUNCTURE: CPT

## 2025-06-24 PROCEDURE — 2500000003 HC RX 250 WO HCPCS: Performed by: NURSE PRACTITIONER

## 2025-06-24 PROCEDURE — 85025 COMPLETE CBC W/AUTO DIFF WBC: CPT

## 2025-06-24 PROCEDURE — 72120 X-RAY BEND ONLY L-S SPINE: CPT

## 2025-06-24 PROCEDURE — 97165 OT EVAL LOW COMPLEX 30 MIN: CPT

## 2025-06-24 PROCEDURE — 99232 SBSQ HOSP IP/OBS MODERATE 35: CPT | Performed by: INTERNAL MEDICINE

## 2025-06-24 PROCEDURE — 80048 BASIC METABOLIC PNL TOTAL CA: CPT

## 2025-06-24 PROCEDURE — G0378 HOSPITAL OBSERVATION PER HR: HCPCS

## 2025-06-24 RX ORDER — LABETALOL HYDROCHLORIDE 5 MG/ML
10 INJECTION, SOLUTION INTRAVENOUS EVERY 4 HOURS PRN
Status: DISCONTINUED | OUTPATIENT
Start: 2025-06-24 | End: 2025-06-26 | Stop reason: HOSPADM

## 2025-06-24 RX ADMIN — INSULIN LISPRO 1 UNITS: 100 INJECTION, SOLUTION INTRAVENOUS; SUBCUTANEOUS at 21:51

## 2025-06-24 RX ADMIN — ATORVASTATIN CALCIUM 10 MG: 20 TABLET, FILM COATED ORAL at 10:30

## 2025-06-24 RX ADMIN — MEMANTINE 10 MG: 10 TABLET ORAL at 21:47

## 2025-06-24 RX ADMIN — SODIUM CHLORIDE, PRESERVATIVE FREE 10 ML: 5 INJECTION INTRAVENOUS at 15:45

## 2025-06-24 RX ADMIN — LOSARTAN POTASSIUM 100 MG: 50 TABLET, FILM COATED ORAL at 10:32

## 2025-06-24 RX ADMIN — SODIUM CHLORIDE, PRESERVATIVE FREE 10 ML: 5 INJECTION INTRAVENOUS at 21:48

## 2025-06-24 RX ADMIN — ACETAMINOPHEN 650 MG: 325 TABLET ORAL at 21:47

## 2025-06-24 RX ADMIN — AMLODIPINE BESYLATE 10 MG: 10 TABLET ORAL at 10:31

## 2025-06-24 RX ADMIN — HYDRALAZINE HYDROCHLORIDE 10 MG: 20 INJECTION INTRAMUSCULAR; INTRAVENOUS at 15:44

## 2025-06-24 RX ADMIN — SODIUM CHLORIDE, PRESERVATIVE FREE 10 ML: 5 INJECTION INTRAVENOUS at 10:30

## 2025-06-24 RX ADMIN — MEMANTINE 10 MG: 10 TABLET ORAL at 10:32

## 2025-06-24 ASSESSMENT — PAIN DESCRIPTION - ORIENTATION: ORIENTATION: MID;LOWER

## 2025-06-24 ASSESSMENT — PAIN DESCRIPTION - PAIN TYPE: TYPE: ACUTE PAIN

## 2025-06-24 ASSESSMENT — PAIN DESCRIPTION - FREQUENCY: FREQUENCY: INTERMITTENT

## 2025-06-24 ASSESSMENT — PAIN DESCRIPTION - DESCRIPTORS: DESCRIPTORS: ACHING

## 2025-06-24 ASSESSMENT — PAIN SCALES - GENERAL
PAINLEVEL_OUTOF10: 2
PAINLEVEL_OUTOF10: 3

## 2025-06-24 ASSESSMENT — PAIN - FUNCTIONAL ASSESSMENT: PAIN_FUNCTIONAL_ASSESSMENT: ACTIVITIES ARE NOT PREVENTED

## 2025-06-24 ASSESSMENT — PAIN DESCRIPTION - LOCATION: LOCATION: BACK

## 2025-06-24 ASSESSMENT — PAIN DESCRIPTION - ONSET: ONSET: ON-GOING

## 2025-06-24 NOTE — PROGRESS NOTES
Lumbar PAUL ordered.  Floor nurse to continue to hold aspirin and make pt NPO if ok with medical.  Pt is Slovenian speaking and unclear if able to consent for self.  IR will call when ready.

## 2025-06-24 NOTE — PROGRESS NOTES
Floor notified IR will be unable to do pt's lumbar PAUL today.  Will plan for tomorrow.  Asked for floor RN to determine if pt able to consent for himself and return call to IR to confirm.

## 2025-06-24 NOTE — CARE COORDINATION
06/24/25 Update CM note: Met with patient/son in the room. The son speaks English. The son says the patient resides at home with his wife. The son states he has a one level home. His son/daughter help at their parents home. He does not have dme equipment. He will need a wheeled walker for home. Referral sent to [facility]. He was following with Dr Vega who is recently retired and the new Dr at the practice is coming soon. They do not know the name. The plan is to return home at discharge. The son is requesting homecare and has no preference.Referral sent to [facility] via [facility]. Will follow for any other needs before discharge. Electronically signed by Radha Jeffers RN CM on 6/24/2025 at 12:59 PM

## 2025-06-24 NOTE — PROGRESS NOTES
OCCUPATIONAL THERAPY INITIAL EVALUATION    [facility] [address] [place], [place]      Date:2025                                                Patient Name: Hank Julio  MRN: [id number]  : 1950  Room: [address] [place] [address]     Evaluating OT:Saundra Dorman, OTR/L   License #  [id number]     Referring Provider: Marguerite Catalan MD   Specific Provider Orders/Date: OT evaluation & treatment      Diagnosis: Lumbar back pain [M54.50]  Intractable back pain [M54.9]  Abnormal CT scan, lumbar spine [R93.7]    Pertinent Medical History:  has a past medical history of Diabetes (HCC), High cholesterol, Hypertension, Mild cognitive impairment, DINO (obstructive sleep apnea), and Pneumonia due to COVID-19 virus.  Surgery:  Fluoroscopic-guided lumbar puncture with contrast injection for CT Myelogram Imaging of lumbar Spine (25)   Past Surgical History:  has a past surgical history that includes back surgery; Examination under anesthesia (N/A, 10/11/2022); knee surgery; Eye surgery (Left); Cholecystectomy; and Neck surgery (Right, 2023).    Precautions:  Fall Risk, neutral spine, German speaking (use translation device)    Assessment of current deficits   [x] Functional mobility   [x]ADLs         [x] Strength  [x]Cognition   [x] Functional transfers  [x] IADLs  [x] Safety Awareness [x]Endurance   [x] Fine Coordination   [x] Balance [] Vision/perception [x]Sensation     []Gross Motor Coordination  [] ROM [] Delirium  [x] Motor Control      OT PLAN OF CARE   OT POC based on physician orders, patient diagnosis and results of clinical assessment     Frequency/Duration: 2-4 days/wk for 2 weeks PRN   Specific OT Treatment Interventions to include:   Instruction/training on adapted ADL techniques and AE recommendations to increase functional independence within precautions  Training on energy conservation strategies, correct breathing pattern and

## 2025-06-24 NOTE — PROGRESS NOTES
Department of Neurosurgery  Progress Note    CHIEF COMPLAINT: Back pain    SUBJECTIVE:   used throughout encounter. Plan for PAUL today. No new issues overnight.     REVIEW OF SYSTEMS :  Constitutional: Negative for chills and fever.    Neurological: Negative for dizziness, tremors and speech change.     OBJECTIVE:   VITALS:  BP (!) 192/76   Pulse 76   Temp 97.8 °F (36.6 °C) (Temporal)   Resp 18   Ht 1.727 m (5' 8\")   Wt 88.5 kg (195 lb)   SpO2 97%   BMI 29.65 kg/m²     PHYSICAL:  Neurologic: Alert and oriented x3; PERRL  Motor Exam:  Motor exam is symmetrical 5 out of 5 all extremities bilaterally  Sensory:  Sensory intact      DATA:  CBC:   Lab Results   Component Value Date/Time    WBC 7.6 06/24/2025 05:16 AM    RBC 4.17 06/24/2025 05:16 AM    HGB 13.1 06/24/2025 05:16 AM    HCT 37.7 06/24/2025 05:16 AM    MCV 90.4 06/24/2025 05:16 AM    MCH 31.4 06/24/2025 05:16 AM    MCHC 34.7 06/24/2025 05:16 AM    RDW 12.5 06/24/2025 05:16 AM     06/24/2025 05:16 AM    MPV 10.0 06/24/2025 05:16 AM     BMP:    Lab Results   Component Value Date/Time     06/24/2025 05:16 AM    K 4.0 06/24/2025 05:16 AM    K 4.4 07/11/2022 12:18 PM     06/24/2025 05:16 AM    CO2 22 06/24/2025 05:16 AM    BUN 26 06/24/2025 05:16 AM    CREATININE 0.9 06/24/2025 05:16 AM    CALCIUM 9.0 06/24/2025 05:16 AM    GFRAA >60 10/11/2022 10:51 AM    LABGLOM 87 06/24/2025 05:16 AM    LABGLOM >60 01/08/2024 09:07 AM    GLUCOSE 158 06/24/2025 05:16 AM     PT/INR:    Lab Results   Component Value Date/Time    PROTIME 11.5 06/22/2025 09:15 AM    INR 1.0 06/22/2025 09:15 AM     PTT:    Lab Results   Component Value Date/Time    APTT 34.1 09/05/2023 02:40 PM   [APTT}    Current Inpatient Medications  Current Facility-Administered Medications: labetalol (NORMODYNE;TRANDATE) injection 10 mg, 10 mg, IntraVENous, Q4H PRN  iopamidol (ISOVUE-M 200) 41 % injection 12 mL, 12 mL, Other, ONCE PRN  amLODIPine (NORVASC) tablet 10 mg, 10 mg,  flexion/extension x-rays ordered.  Pain control  PT/OT    Electronically signed by Luciana Chandler on 6/24/2025 at 10:56 AM

## 2025-06-24 NOTE — PROGRESS NOTES
[facility] Hospitalist Progress Note    Admitting Date and Time: 6/21/2025  9:28 PM  Admit Dx: Lumbar back pain [M54.50]  Intractable back pain [M54.9]  Abnormal CT scan, lumbar spine [R93.7]    Subjective:  Patient is being followed for Lumbar back pain [M54.50]  Intractable back pain [M54.9]  Abnormal CT scan, lumbar spine [R93.7]   Pt feels okay. No acute complaints.       ROS: denies fever, chills, cp, sob, n/v, HA unless stated above.      amLODIPine  10 mg Oral Daily    lidocaine  1 patch TransDERmal Daily    sodium chloride flush  5-40 mL IntraVENous 2 times per day    [Held by provider] aspirin  81 mg Oral Daily    losartan  100 mg Oral Daily    memantine  10 mg Oral BID    atorvastatin  10 mg Oral Daily    insulin lispro  0-4 Units SubCUTAneous 4x Daily AC & HS    ketorolac  15 mg IntraVENous Once     iopamidol, 12 mL, ONCE PRN  sodium chloride flush, 5-40 mL, PRN  sodium chloride, , PRN  potassium chloride, 40 mEq, PRN   Or  potassium alternative oral replacement, 40 mEq, PRN   Or  potassium chloride, 10 mEq, PRN  magnesium sulfate, 2,000 mg, PRN  ondansetron, 4 mg, Q8H PRN   Or  ondansetron, 4 mg, Q6H PRN  polyethylene glycol, 17 g, Daily PRN  acetaminophen, 650 mg, Q6H PRN   Or  acetaminophen, 650 mg, Q6H PRN  glucose, 4 tablet, PRN  dextrose bolus, 125 mL, PRN   Or  dextrose bolus, 250 mL, PRN  glucagon (rDNA), 1 mg, PRN  dextrose, , Continuous PRN  hydrALAZINE, 10 mg, Q6H PRN         Objective:    BP (!) 161/79   Pulse 71   Temp 97.9 °F (36.6 °C) (Temporal)   Resp 18   Ht 1.727 m (5' 8\")   Wt 88.5 kg (195 lb)   SpO2 99%   BMI 29.65 kg/m²     General Appearance: alert and oriented to person, place and time and in no acute distress  Skin: warm and dry  Head: normocephalic and atraumatic  Eyes: pupils equal, round, and reactive to light, extraocular eye movements intact, conjunctivae normal  Neck: neck supple and non tender without mass   Pulmonary/Chest: clear to auscultation bilaterally-

## 2025-06-24 NOTE — CARE COORDINATION
06/24/25 Update CM Note: [facility] unable to accept, Referral sent to [facility] via [facility].Electronically signed by Radha Jeffers RN CM on 6/24/2025 at 1:22 PM

## 2025-06-24 NOTE — PLAN OF CARE
Problem: Chronic Conditions and Co-morbidities  Goal: Patient's chronic conditions and co-morbidity symptoms are monitored and maintained or improved  6/24/2025 1106 by Arcelia Rick, RN  Outcome: Progressing     Problem: Discharge Planning  Goal: Discharge to home or other facility with appropriate resources  6/24/2025 1106 by Arcelia Rick, RN  Outcome: Progressing     Problem: Pain  Goal: Verbalizes/displays adequate comfort level or baseline comfort level  6/24/2025 1106 by Arcelia Rick, RN  Outcome: Progressing     Problem: Safety - Adult  Goal: Free from fall injury  6/24/2025 1106 by Arcelia Rick, RN  Outcome: Progressing

## 2025-06-25 ENCOUNTER — APPOINTMENT (OUTPATIENT)
Dept: CT IMAGING | Age: 75
DRG: 543 | End: 2025-06-25
Payer: MEDICARE

## 2025-06-25 ENCOUNTER — APPOINTMENT (OUTPATIENT)
Dept: INTERVENTIONAL RADIOLOGY/VASCULAR | Age: 75
DRG: 543 | End: 2025-06-25
Payer: MEDICARE

## 2025-06-25 PROBLEM — C79.51 METASTASIS TO BONE (HCC): Status: ACTIVE | Noted: 2025-06-25

## 2025-06-25 LAB
FREE KAPPA/LAMBDA RATIO: 1.01 (ref 0.22–1.74)
GLUCOSE BLD-MCNC: 139 MG/DL (ref 74–99)
GLUCOSE BLD-MCNC: 140 MG/DL (ref 74–99)
GLUCOSE BLD-MCNC: 152 MG/DL (ref 74–99)
GLUCOSE BLD-MCNC: 312 MG/DL (ref 74–99)
KAPPA LC FREE SER-MCNC: 12.8 MG/L
LAMBDA LC FREE SERPL-MCNC: 12.7 MG/L (ref 4.2–27.7)
PSA SERPL-MCNC: 4.26 NG/ML (ref 0–4)

## 2025-06-25 PROCEDURE — 84155 ASSAY OF PROTEIN SERUM: CPT

## 2025-06-25 PROCEDURE — 62323 NJX INTERLAMINAR LMBR/SAC: CPT

## 2025-06-25 PROCEDURE — 6370000000 HC RX 637 (ALT 250 FOR IP): Performed by: NURSE PRACTITIONER

## 2025-06-25 PROCEDURE — 6360000004 HC RX CONTRAST MEDICATION: Performed by: SPECIALIST

## 2025-06-25 PROCEDURE — 84156 ASSAY OF PROTEIN URINE: CPT

## 2025-06-25 PROCEDURE — 6370000000 HC RX 637 (ALT 250 FOR IP): Performed by: INTERNAL MEDICINE

## 2025-06-25 PROCEDURE — 6370000000 HC RX 637 (ALT 250 FOR IP): Performed by: EMERGENCY MEDICINE

## 2025-06-25 PROCEDURE — 2709999900 IR GUIDED INJ LUMB/SAC TRANSFORAMINAL EPI SINGLE LEVEL

## 2025-06-25 PROCEDURE — 6360000002 HC RX W HCPCS: Performed by: SPECIALIST

## 2025-06-25 PROCEDURE — 84165 PROTEIN E-PHORESIS SERUM: CPT

## 2025-06-25 PROCEDURE — 71260 CT THORAX DX C+: CPT

## 2025-06-25 PROCEDURE — G0378 HOSPITAL OBSERVATION PER HR: HCPCS

## 2025-06-25 PROCEDURE — 36415 COLL VENOUS BLD VENIPUNCTURE: CPT

## 2025-06-25 PROCEDURE — 99232 SBSQ HOSP IP/OBS MODERATE 35: CPT

## 2025-06-25 PROCEDURE — 83521 IG LIGHT CHAINS FREE EACH: CPT

## 2025-06-25 PROCEDURE — 86335 IMMUNFIX E-PHORSIS/URINE/CSF: CPT

## 2025-06-25 PROCEDURE — 82962 GLUCOSE BLOOD TEST: CPT

## 2025-06-25 PROCEDURE — 84166 PROTEIN E-PHORESIS/URINE/CSF: CPT

## 2025-06-25 PROCEDURE — 74177 CT ABD & PELVIS W/CONTRAST: CPT

## 2025-06-25 PROCEDURE — 2500000003 HC RX 250 WO HCPCS: Performed by: NURSE PRACTITIONER

## 2025-06-25 PROCEDURE — 84153 ASSAY OF PSA TOTAL: CPT

## 2025-06-25 RX ORDER — MIDAZOLAM HYDROCHLORIDE 1 MG/ML
INJECTION, SOLUTION INTRAMUSCULAR; INTRAVENOUS PRN
Status: COMPLETED | OUTPATIENT
Start: 2025-06-25 | End: 2025-06-25

## 2025-06-25 RX ORDER — TRIAMCINOLONE ACETONIDE 40 MG/ML
INJECTION, SUSPENSION INTRA-ARTICULAR; INTRAMUSCULAR PRN
Status: COMPLETED | OUTPATIENT
Start: 2025-06-25 | End: 2025-06-25

## 2025-06-25 RX ORDER — SODIUM CHLORIDE 0.9 % (FLUSH) 0.9 %
10 SYRINGE (ML) INJECTION
Status: DISCONTINUED | OUTPATIENT
Start: 2025-06-25 | End: 2025-06-26 | Stop reason: HOSPADM

## 2025-06-25 RX ORDER — LIDOCAINE HYDROCHLORIDE 10 MG/ML
INJECTION, SOLUTION EPIDURAL; INFILTRATION; INTRACAUDAL; PERINEURAL PRN
Status: COMPLETED | OUTPATIENT
Start: 2025-06-25 | End: 2025-06-25

## 2025-06-25 RX ORDER — IOPAMIDOL 755 MG/ML
75 INJECTION, SOLUTION INTRAVASCULAR
Status: COMPLETED | OUTPATIENT
Start: 2025-06-25 | End: 2025-06-25

## 2025-06-25 RX ORDER — FENTANYL CITRATE 50 UG/ML
INJECTION, SOLUTION INTRAMUSCULAR; INTRAVENOUS PRN
Status: COMPLETED | OUTPATIENT
Start: 2025-06-25 | End: 2025-06-25

## 2025-06-25 RX ORDER — LIDOCAINE HYDROCHLORIDE 20 MG/ML
INJECTION, SOLUTION INFILTRATION; PERINEURAL PRN
Status: COMPLETED | OUTPATIENT
Start: 2025-06-25 | End: 2025-06-25

## 2025-06-25 RX ADMIN — MEMANTINE 10 MG: 10 TABLET ORAL at 10:15

## 2025-06-25 RX ADMIN — MIDAZOLAM 0.5 MG: 1 INJECTION INTRAMUSCULAR; INTRAVENOUS at 17:11

## 2025-06-25 RX ADMIN — LIDOCAINE HYDROCHLORIDE 5 ML: 20 INJECTION, SOLUTION INFILTRATION; PERINEURAL at 17:16

## 2025-06-25 RX ADMIN — LOSARTAN POTASSIUM 100 MG: 50 TABLET, FILM COATED ORAL at 10:15

## 2025-06-25 RX ADMIN — LIDOCAINE HYDROCHLORIDE 2 ML: 10 INJECTION, SOLUTION EPIDURAL; INFILTRATION; INTRACAUDAL; PERINEURAL at 17:17

## 2025-06-25 RX ADMIN — FENTANYL CITRATE 50 MCG: 50 INJECTION, SOLUTION INTRAMUSCULAR; INTRAVENOUS at 17:17

## 2025-06-25 RX ADMIN — ATORVASTATIN CALCIUM 10 MG: 20 TABLET, FILM COATED ORAL at 10:14

## 2025-06-25 RX ADMIN — MIDAZOLAM 0.5 MG: 1 INJECTION INTRAMUSCULAR; INTRAVENOUS at 17:17

## 2025-06-25 RX ADMIN — SODIUM CHLORIDE, PRESERVATIVE FREE 10 ML: 5 INJECTION INTRAVENOUS at 21:16

## 2025-06-25 RX ADMIN — AMLODIPINE BESYLATE 10 MG: 10 TABLET ORAL at 10:13

## 2025-06-25 RX ADMIN — TRIAMCINOLONE ACETONIDE 80 MG: 40 INJECTION, SUSPENSION INTRA-ARTICULAR; INTRAMUSCULAR at 17:17

## 2025-06-25 RX ADMIN — LIDOCAINE HYDROCHLORIDE 10 ML: 20 INJECTION, SOLUTION INFILTRATION; PERINEURAL at 17:09

## 2025-06-25 RX ADMIN — IOPAMIDOL 75 ML: 755 INJECTION, SOLUTION INTRAVENOUS at 11:31

## 2025-06-25 RX ADMIN — SODIUM CHLORIDE, PRESERVATIVE FREE 10 ML: 5 INJECTION INTRAVENOUS at 09:30

## 2025-06-25 RX ADMIN — FENTANYL CITRATE 50 MCG: 50 INJECTION, SOLUTION INTRAMUSCULAR; INTRAVENOUS at 17:02

## 2025-06-25 RX ADMIN — INSULIN LISPRO 3 UNITS: 100 INJECTION, SOLUTION INTRAVENOUS; SUBCUTANEOUS at 21:15

## 2025-06-25 RX ADMIN — MEMANTINE 10 MG: 10 TABLET ORAL at 21:16

## 2025-06-25 NOTE — PRE SEDATION
PRE-SEDATION ASSESSMENT NOTE    Patient Name: Hank Julio   Medical Record Number: [id number]  Date: 6/25/25   Time: 5:24 PM EDT   [facility]  Admitting Diagnosis: Lumbar back pain    1. HISTORY & PHYSICAL EXAMINATION:  Comments: for lumbar epidural injection     Vitals:    06/25/25 1618   BP: (!) 161/73   Pulse: 70   Resp: 14   Temp: 97.8 °F (36.6 °C)   SpO2: 97%       Allergies: Patient has no known allergies.    2. Heart and Lungs immediately prior to procedure demonstrate no contraindications to proceed    Drug: no  Tobacco: no    3. PAST ANESTHESIA EXPERIENCE:  Previous History / Non Contributory.     4. AIRWAY/TEETH/HEAD & NECK(Mallampati Classification):  II (soft palate, uvula, fauces visible)    5: NORMAL RANGE OF MOTION OF NECK: Yes    6. PATIENT WILL LIKELY TOLERATE PLAN OF MODERATE SEDATION    7. ASA 2.    Electronically signed by Keesha Pineda MD

## 2025-06-25 NOTE — OP NOTE
Postoperative Note  ______________________________________________________________       [facility] 5S NEURO SPINE    Patient Name: Hank Julio   YOB: 1950  Medical Record Number: [id number]  Date of Procedure: 6/25/25  Room/Bed: [address] [place] [address]    Pre-operative Diagnosis and Procedure: lumbar back pain    Post-operative Diagnosis: Same    Consent: INFORMED CONSENT WAS OBTAINED BY patient, RISK AND BENEFITS WERE DISCUSSED.     Anesthesia: Local anesthesia with approximately 10 mL of 1% Lidocaine without epinephrine administered subcutaneously. intravenous sedation.    Estimated Blood Loss: < 10 cc    Performed by: Keesha Pineda MD     Complications: none    Specimen obtained: none    Findings: Lumbar epidural performed at the L4/5 level using kenalog and 1% lidocaine    Electronically signed by Keesha Pineda MD   DD: 6/25/25  5:26 PM

## 2025-06-25 NOTE — CONSULTS
[facility]  Hematology/Oncology  Consult      Patient Name: Hank Julio  YOB: 1950  PCP: Opal Vega MD   Referring Provider:      Reason for Consultation:   Chief Complaint   Patient presents with    Back Pain     Pt complaint of severe back pain today after trying to get up from toilet.         History of Present Illness: This is a 75-year-old female patient with a PMH of DM, HTN, DINO who presented to the ED for evaluation of back pain. CT lumbar spine showed moderate central canal stenosis at L3-4. Multilevel neural foraminal stenoses, worst (moderate-to-severe) at L5-S1. Small lytic and sclerotic lesions in the T12 spinous process in the bilateral iliac bones of unclear etiology, possibly representing metastatic disease. NM bone scan showed multiple active osseous lesions in random distribution in the axial skeleton concerning for metastasis. Degenerative changes.  Periodontal disease. Neurosurgery consulted. Patient is for epidural steroid injection. CT chest/A/P have been ordered and pending. PSA 4.26. SPEP and UPEP pending. Radiation oncology consulted with recommendations. CMP, LFT's, and CBC unremarkable. Consultation for metastasis osseous bone lesion.       Diagnostic Data:     Past Medical History:   Diagnosis Date    Diabetes (HCC) 2002    High cholesterol     Hypertension 2015    Mild cognitive impairment     States forgets somethings was told is due to the sleep apnea \"That is whey I am having the surgery\"    DINO (obstructive sleep apnea)     Pneumonia due to COVID-19 virus 2022       Patient Active Problem List    Diagnosis Date Noted    Type 2 diabetes mellitus without complication, without long-term current use of insulin (HCC) 11/01/2022    Primary hypertension 11/01/2022    Serum lipids high 11/01/2022    Mild cognitive impairment 11/01/2022    DINO (obstructive sleep apnea) 10/18/2022    Lumbar back pain 06/22/2025    GANESH (acute kidney injury)

## 2025-06-25 NOTE — PLAN OF CARE
Problem: Chronic Conditions and Co-morbidities  Goal: Patient's chronic conditions and co-morbidity symptoms are monitored and maintained or improved  6/25/2025 1611 by Arcelia Rick, RN  Outcome: Progressing     Problem: Discharge Planning  Goal: Discharge to home or other facility with appropriate resources  6/25/2025 1611 by Arcelia Rick, RN  Outcome: Progressing     Problem: Pain  Goal: Verbalizes/displays adequate comfort level or baseline comfort level  6/25/2025 1611 by Arcelia Rick, RN  Outcome: Progressing     Problem: Safety - Adult  Goal: Free from fall injury  6/25/2025 1611 by Arcelia Rick, RN  Outcome: Progressing

## 2025-06-25 NOTE — CARE COORDINATION
06/25/25 Update CM Note: Patient is NPO for epidural today. Not completed yesterday. Oncology/Radiation Oncology consult pending due to spinal mets. Discharge plan remains to home with [facility]. Obtained ww for patient. Will follow for readiness to discharge.Electronically signed by Radha Jeffers RN CM on 6/25/2025 at 10:47 AM

## 2025-06-25 NOTE — PROGRESS NOTES
[facility] Hospitalist Progress Note    Admitting Date and Time: 6/21/2025  9:28 PM  Admit Dx: Lumbar back pain [M54.50]  Intractable back pain [M54.9]  Abnormal CT scan, lumbar spine [R93.7]    Synopsis:  75-year male from [place] with past medical history of diabetes mellitus, hyperlipidemia, hypertension, DINO to the hospital due to back pain.  He is a Greek-speaking.  It is getting worse since 1 month.  CT lumbar spine show lytic lesion, neurosurgery was consulted.  Bone scan showed multiple active osseous lesion in random distribution and axial skeleton concerning for metastasis.  Oncology consulted.  PSA slightly elevated.  SPEP/UPEP, CT abdomen/pelvis and CT chest ordered.   Radiation oncology consulted.      Subjective:  Patient is being followed for Lumbar back pain [M54.50]  Intractable back pain [M54.9]  Abnormal CT scan, lumbar spine [R93.7]     Patient was seen at bedside.  Patient daughter present at the bedside as well.  He still complaining of back pain but is getting slight better.  Denies any urine frequency, chest pain, shortness of breath, nausea/vomiting, abdominal pain, weight loss, decreased appetite    ROS: denies fever, chills, cp, sob, n/v, HA unless stated above.     amLODIPine  10 mg Oral Daily    lidocaine  1 patch TransDERmal Daily    sodium chloride flush  5-40 mL IntraVENous 2 times per day    [Held by provider] aspirin  81 mg Oral Daily    losartan  100 mg Oral Daily    memantine  10 mg Oral BID    atorvastatin  10 mg Oral Daily    insulin lispro  0-4 Units SubCUTAneous 4x Daily AC & HS    ketorolac  15 mg IntraVENous Once     sodium chloride flush, 10 mL, Once PRN  labetalol, 10 mg, Q4H PRN  iopamidol, 12 mL, ONCE PRN  sodium chloride flush, 5-40 mL, PRN  sodium chloride, , PRN  potassium chloride, 40 mEq, PRN   Or  potassium alternative oral replacement, 40 mEq, PRN   Or  potassium chloride, 10 mEq, PRN  magnesium sulfate, 2,000 mg, PRN  ondansetron, 4 mg, Q8H PRN

## 2025-06-25 NOTE — PROGRESS NOTES
1600 Patient arrived from floor for lumbar epidural steroid injection.  Chart/vs/medications reviewed.  PIV flushed. VSS    1610 Dr Pineda at bedside to review procedure and obtain consent.

## 2025-06-26 ENCOUNTER — HOSPITAL ENCOUNTER (OUTPATIENT)
Dept: RADIATION ONCOLOGY | Age: 75
Discharge: HOME | End: 2025-06-26
Payer: MEDICARE

## 2025-06-26 VITALS
HEART RATE: 75 BPM | SYSTOLIC BLOOD PRESSURE: 147 MMHG | WEIGHT: 195 LBS | HEIGHT: 68 IN | TEMPERATURE: 97.7 F | OXYGEN SATURATION: 99 % | RESPIRATION RATE: 17 BRPM | BODY MASS INDEX: 29.55 KG/M2 | DIASTOLIC BLOOD PRESSURE: 71 MMHG

## 2025-06-26 PROBLEM — M54.50 LOWER BACK PAIN: Status: ACTIVE | Noted: 2025-06-26

## 2025-06-26 LAB
GLUCOSE BLD-MCNC: 166 MG/DL (ref 74–99)
GLUCOSE BLD-MCNC: 244 MG/DL (ref 74–99)

## 2025-06-26 PROCEDURE — 82962 GLUCOSE BLOOD TEST: CPT

## 2025-06-26 PROCEDURE — 99239 HOSP IP/OBS DSCHRG MGMT >30: CPT

## 2025-06-26 PROCEDURE — G0378 HOSPITAL OBSERVATION PER HR: HCPCS

## 2025-06-26 PROCEDURE — 6370000000 HC RX 637 (ALT 250 FOR IP): Performed by: INTERNAL MEDICINE

## 2025-06-26 PROCEDURE — 2500000003 HC RX 250 WO HCPCS: Performed by: NURSE PRACTITIONER

## 2025-06-26 PROCEDURE — 84156 ASSAY OF PROTEIN URINE: CPT

## 2025-06-26 PROCEDURE — 6370000000 HC RX 637 (ALT 250 FOR IP): Performed by: NURSE PRACTITIONER

## 2025-06-26 PROCEDURE — 6370000000 HC RX 637 (ALT 250 FOR IP): Performed by: EMERGENCY MEDICINE

## 2025-06-26 PROCEDURE — 84166 PROTEIN E-PHORESIS/URINE/CSF: CPT

## 2025-06-26 RX ORDER — AMLODIPINE BESYLATE 10 MG/1
10 TABLET ORAL DAILY
Qty: 30 TABLET | Refills: 3 | Status: SHIPPED | OUTPATIENT
Start: 2025-06-27

## 2025-06-26 RX ADMIN — AMLODIPINE BESYLATE 10 MG: 10 TABLET ORAL at 09:35

## 2025-06-26 RX ADMIN — SODIUM CHLORIDE, PRESERVATIVE FREE 10 ML: 5 INJECTION INTRAVENOUS at 09:36

## 2025-06-26 RX ADMIN — MEMANTINE 10 MG: 10 TABLET ORAL at 09:35

## 2025-06-26 RX ADMIN — INSULIN LISPRO 1 UNITS: 100 INJECTION, SOLUTION INTRAVENOUS; SUBCUTANEOUS at 11:13

## 2025-06-26 RX ADMIN — ATORVASTATIN CALCIUM 10 MG: 20 TABLET, FILM COATED ORAL at 09:35

## 2025-06-26 RX ADMIN — LOSARTAN POTASSIUM 100 MG: 50 TABLET, FILM COATED ORAL at 09:35

## 2025-06-26 NOTE — PLAN OF CARE
Problem: Chronic Conditions and Co-morbidities  Goal: Patient's chronic conditions and co-morbidity symptoms are monitored and maintained or improved  6/26/2025 1338 by Loulou Chairez RN  Outcome: Adequate for Discharge     Problem: Discharge Planning  Goal: Discharge to home or other facility with appropriate resources  6/26/2025 1338 by Loulou Chairez RN  Outcome: Adequate for Discharge     Problem: Pain  Goal: Verbalizes/displays adequate comfort level or baseline comfort level  6/26/2025 1338 by Loulou Chairez RN  Outcome: Adequate for Discharge     Problem: Safety - Adult  Goal: Free from fall injury  6/26/2025 1338 by Loulou Chairez RN  Outcome: Adequate for Discharge

## 2025-06-26 NOTE — CARE COORDINATION
6/26/25 CASE MANAGEMENT NOTE: Placed on discharge instructions  APPOINTMENT FOR DR ALLEN   RADIATION ONCOLOGY   JULY 3RD 2025 AT 8:30AM  [address] [place] [address] 3  [phone]     Electronically signed by Radha Jeffers RN CM on 6/26/2025 at 2:07 PM

## 2025-06-26 NOTE — DISCHARGE SUMMARY
[facility] Hospitalist Physician Discharge Summary       [facility]  [address]  [place] [id number]  [phone]        Opal Vega MD  [address] [id number]  [phone]    Call      Nadir Vazquez MD  [address]  [place] [id number]  [phone]    Call  Please call and make appointment    Bernardo Roa MD  [address] [facility] [address]  [phone]    Schedule an appointment as soon as possible for a visit in 1 week(s)  Please call and make appointment      Activity level: As tolerated     Dispo: Home    Condition on discharge: Stable     Patient ID:  Hank Julio  [id number]  75 y.o.  1950    Admit date: 6/21/2025    Discharge date and time:  6/26/2025  12:39 PM    Admission Diagnoses: Principal Problem:    Lumbar back pain  Active Problems:    GANESH (acute kidney injury)    Metastasis to bone (HCC)    Lower back pain  Resolved Problems:    * No resolved hospital problems. *      Discharge Diagnoses: Principal Problem:    Lumbar back pain  Active Problems:    GANESH (acute kidney injury)    Metastasis to bone (HCC)    Lower back pain  Resolved Problems:    * No resolved hospital problems. *      Consults:  IP CONSULT TO NEUROSURGERY  IP CONSULT TO RADIATION ONCOLOGY  IP CONSULT TO ONCOLOGY  IP CONSULT TO HOME CARE NEEDS    Procedures: s/p lumbar epidural steroid injection    Hospital Course:   Patient Hank Julio is a 75 y.o. presented with Lumbar back pain [M54.50]  Intractable back pain [M54.9]  Abnormal CT scan, lumbar spine [R93.7]  Lower back pain [M54.50]    75-year male from [place] with past medical history of diabetes mellitus, hyperlipidemia, hypertension, DINO to the hospital due to back pain.  He is a Turkmen-speaking.  It is getting worse since 1 month.  CT lumbar spine show lytic lesion, neurosurgery was consulted.  IR was consulted and patient had lumbar epidural steroid

## 2025-06-26 NOTE — PROGRESS NOTES
CLINICAL PHARMACY NOTE: MEDS TO BEDS    Total # of Prescriptions Filled: 1   The following medications were delivered to the patient:  Amlodipine 10 mg    Additional Documentation:   Delivered to pt

## 2025-06-26 NOTE — CARE COORDINATION
06/26/25 Update CM Note: per daughter patient will need a SW added to the orders. Per daughter, the patient will have help from the brother who is here from [place] and herself. The patients wife is recovering from a surgery and is in need of help herself. [facility] homecare [facility]. Electronically signed by Radha Jeffers RN CM on 6/26/2025 at 10:42 AM

## 2025-06-26 NOTE — PLAN OF CARE
Problem: Chronic Conditions and Co-morbidities  Goal: Patient's chronic conditions and co-morbidity symptoms are monitored and maintained or improved  6/26/2025 0553 by Goyo Washington RN  Outcome: Progressing  6/25/2025 1611 by Arcelia Rick RN  Outcome: Progressing     Problem: Discharge Planning  Goal: Discharge to home or other facility with appropriate resources  6/26/2025 0553 by Goyo Washington RN  Outcome: Progressing  6/25/2025 1611 by Arcelia Rick RN  Outcome: Progressing     Problem: Pain  Goal: Verbalizes/displays adequate comfort level or baseline comfort level  6/26/2025 0553 by Goyo Washington RN  Outcome: Progressing  6/25/2025 1611 by Arcelia Rick RN  Outcome: Progressing     Problem: Safety - Adult  Goal: Free from fall injury  6/26/2025 0553 by Goyo Washington RN  Outcome: Progressing  6/25/2025 1611 by Arcelia Rick RN  Outcome: Progressing

## 2025-06-27 LAB
ALBUMIN SERPL-MCNC: 3.5 G/DL (ref 3.5–4.7)
ALPHA1 GLOB SERPL ELPH-MCNC: 0.2 G/DL (ref 0.2–0.4)
ALPHA2 GLOB SERPL ELPH-MCNC: 1 G/DL (ref 0.5–1)
B-GLOBULIN SERPL ELPH-MCNC: 1.1 G/DL (ref 0.8–1.3)
GAMMA GLOB SERPL ELPH-MCNC: 0.9 G/DL (ref 0.7–1.6)
P E INTERPRETATION, U: NORMAL
P E INTERPRETATION, U: NORMAL
PATHOLOGIST: NORMAL
PROT PATTERN SERPL ELPH-IMP: NORMAL
PROT SERPL-MCNC: 6.8 G/DL (ref 6.4–8.3)
SPECIMEN TYPE: NORMAL
URINE IFX INTERP: NORMAL

## 2025-06-30 ENCOUNTER — TELEPHONE (OUTPATIENT)
Dept: PRIMARY CARE CLINIC | Age: 75
End: 2025-06-30

## 2025-06-30 NOTE — TELEPHONE ENCOUNTER
Care Transitions Initial Follow Up Call    Outreach made within 2 business days of discharge: Yes    Patient: Hank Julio Patient : 1950   MRN: [id number]  Reason for Admission: Lumbar Back Pain  Discharge Date: 25       Spoke with: N/A, message left on voicemail. Used [employer],  name Marty, #[id number]      Follow Up  Future Appointments   Date Time Provider Department Center   7/3/2025  8:30 AM Nadir Vazquez MD SE Rad Onc [facility]   9/10/2025  1:40 PM Gayatri Sanchez DO [facility] ECC DEP   2025  1:00 PM Leyla Gannon APRN - CNP [facility] NEURO Neurology -   2026  1:00 PM Gayatri Sanchez DO [facility] DEP       Emilee Sharif LPN

## 2025-07-03 ENCOUNTER — HOSPITAL ENCOUNTER (OUTPATIENT)
Dept: RADIATION ONCOLOGY | Age: 75
Discharge: HOME | End: 2025-07-03
Payer: MEDICARE

## 2025-07-03 VITALS
BODY MASS INDEX: 29.09 KG/M2 | WEIGHT: 191.3 LBS | HEART RATE: 75 BPM | SYSTOLIC BLOOD PRESSURE: 139 MMHG | OXYGEN SATURATION: 97 % | RESPIRATION RATE: 18 BRPM | DIASTOLIC BLOOD PRESSURE: 66 MMHG

## 2025-07-03 PROCEDURE — 99205 OFFICE O/P NEW HI 60 MIN: CPT

## 2025-07-03 NOTE — PROGRESS NOTES
Radiation Oncology    Nadir Vazquez MD                New Consult Note    Name:  Hank Julio  : 1950    Diagnosis: Possible metastatic cancer.    Stage:IV    HPI    Hank Julio is a 75 y.o. male who presents today upon referral from the inpatient team.  Patient is a very kind 66-year-old gentleman who originally presented with some intractable back pain last week.  I saw him briefly as an inpatient.    Originally, he had a laminectomy done in [place] and on 2025 had a CT of the lumbar spine with contrast.  This showed moderate central canal stenosis at L4/L5.  There was some lytic and sclerotic lesions in the T12 spinous process and in the bilateral iliac bones.  Bone scan did show multiple obvious osseous bone metastases.  PSA was 4.26.  The patient did undergo a myelogram with epidural treatment for pain.  And his pain is virtually totally resolved.  Fact, he has 0 pain with walking there is some mild pain with lying down but better than before.  The epidural was administered on 2025.  The patient sees medical oncology next week.    Currently, there are no bowel or bladder changes.  There is no motor weakness.  There is no sensory changes.      Review of Systems    Review of Systems - Negative except as above             Past Medical History:   Diagnosis Date    Diabetes (HCC)     High cholesterol     Hypertension     Mild cognitive impairment     States forgets somethings was told is due to the sleep apnea \"That is whey I am having the surgery\"    DINO (obstructive sleep apnea)     Pneumonia due to COVID-19 virus          Past Surgical History:   Procedure Laterality Date    BACK SURGERY      lumbar discectomy  \"L5-S1\"    CHOLECYSTECTOMY      EXAMINATION UNDER ANESTHESIA N/A 10/11/2022    DRUG INDUCED SLEEP ENDOSCOPY performed by Kem Pond DO at [facility] OR    EYE SURGERY Left     \"Done in [place] for rednes, laser\"    KNEE SURGERY

## 2025-07-03 NOTE — PROGRESS NOTES
Here and education given for bone radiation with  used and after discussion with Dr. Vazquez there is no diagnoses cancer and we will not be giving radiation therapy. He was with family and they expressed understanding of care.

## 2025-07-03 NOTE — CONSULTS
Session ID: [id number]  Session Duration: Longer than 54 minutes  Language: Vietnamese   ID: #[id number]   Name: Emi

## (undated) DEVICE — KIT,ANTI FOG,W/SPONGE & FLUID,SOFT PACK: Brand: MEDLINE

## (undated) DEVICE — SURGICAL PROCEDURE PACK EENT CUST

## (undated) DEVICE — GLOVE ORANGE PI 7 1/2   MSG9075

## (undated) DEVICE — MARKER,SKIN,WI/RULER AND LABELS: Brand: MEDLINE

## (undated) DEVICE — BASIC SINGLE BASIN 1-LF: Brand: MEDLINE INDUSTRIES, INC.

## (undated) DEVICE — TOWEL,OR,DSP,ST,BLUE,STD,6/PK,12PK/CS: Brand: MEDLINE

## (undated) DEVICE — GUARD TEETH AD PR NYL

## (undated) DEVICE — 4-PORT MANIFOLD: Brand: NEPTUNE 2